# Patient Record
Sex: FEMALE | Race: BLACK OR AFRICAN AMERICAN | Employment: FULL TIME | URBAN - METROPOLITAN AREA
[De-identification: names, ages, dates, MRNs, and addresses within clinical notes are randomized per-mention and may not be internally consistent; named-entity substitution may affect disease eponyms.]

---

## 2018-08-22 ENCOUNTER — PAT TELEPHONE (OUTPATIENT)
Dept: PREADMISSION TESTING | Age: 38
End: 2018-08-22

## 2018-08-22 VITALS — HEIGHT: 69 IN | BODY MASS INDEX: 43.4 KG/M2 | WEIGHT: 293 LBS

## 2018-08-22 RX ORDER — AMLODIPINE BESYLATE 10 MG/1
10 TABLET ORAL DAILY
COMMUNITY
End: 2021-09-06

## 2018-08-22 RX ORDER — FUROSEMIDE 80 MG
80 TABLET ORAL DAILY
COMMUNITY

## 2018-08-27 ENCOUNTER — HOSPITAL ENCOUNTER (OUTPATIENT)
Dept: ENDOSCOPY | Age: 38
Discharge: HOME | End: 2018-08-27
Attending: INTERNAL MEDICINE | Admitting: INTERNAL MEDICINE

## 2018-08-27 VITALS
BODY MASS INDEX: 43.4 KG/M2 | OXYGEN SATURATION: 99 % | DIASTOLIC BLOOD PRESSURE: 91 MMHG | HEIGHT: 69 IN | RESPIRATION RATE: 18 BRPM | SYSTOLIC BLOOD PRESSURE: 174 MMHG | TEMPERATURE: 97.4 F | HEART RATE: 71 BPM | WEIGHT: 293 LBS

## 2018-08-27 LAB
ANION GAP SERPL CALCULATED.3IONS-SCNC: 16 MMOL/L (ref 3–16)
BUN BLDV-MCNC: 38 MG/DL (ref 7–20)
CALCIUM SERPL-MCNC: 9.4 MG/DL (ref 8.3–10.6)
CHLORIDE BLD-SCNC: 95 MMOL/L (ref 99–110)
CO2: 22 MMOL/L (ref 21–32)
CREAT SERPL-MCNC: 5.2 MG/DL (ref 0.6–1.1)
GFR AFRICAN AMERICAN: 11
GFR NON-AFRICAN AMERICAN: 9
GLUCOSE BLD-MCNC: 128 MG/DL (ref 70–99)
GLUCOSE BLD-MCNC: 159 MG/DL (ref 70–99)
GLUCOSE BLD-MCNC: 161 MG/DL (ref 70–99)
PERFORMED ON: ABNORMAL
PERFORMED ON: ABNORMAL
POTASSIUM REFLEX MAGNESIUM: 4.2 MMOL/L (ref 3.5–5.1)
PREGNANCY, URINE: NEGATIVE
SODIUM BLD-SCNC: 133 MMOL/L (ref 136–145)

## 2018-08-27 RX ORDER — FENTANYL CITRATE 50 UG/ML
50 INJECTION, SOLUTION INTRAMUSCULAR; INTRAVENOUS EVERY 5 MIN PRN
Status: DISCONTINUED | OUTPATIENT
Start: 2018-08-27 | End: 2018-08-28 | Stop reason: HOSPADM

## 2018-08-27 RX ORDER — SODIUM CHLORIDE 0.9 % (FLUSH) 0.9 %
10 SYRINGE (ML) INJECTION EVERY 12 HOURS SCHEDULED
Status: DISCONTINUED | OUTPATIENT
Start: 2018-08-27 | End: 2018-08-28 | Stop reason: HOSPADM

## 2018-08-27 RX ORDER — SODIUM CHLORIDE 0.9 % (FLUSH) 0.9 %
10 SYRINGE (ML) INJECTION PRN
Status: DISCONTINUED | OUTPATIENT
Start: 2018-08-27 | End: 2018-08-28 | Stop reason: HOSPADM

## 2018-08-27 RX ORDER — MEPERIDINE HYDROCHLORIDE 25 MG/ML
12.5 INJECTION INTRAMUSCULAR; INTRAVENOUS; SUBCUTANEOUS EVERY 5 MIN PRN
Status: DISCONTINUED | OUTPATIENT
Start: 2018-08-27 | End: 2018-08-28 | Stop reason: HOSPADM

## 2018-08-27 RX ORDER — FENTANYL CITRATE 50 UG/ML
25 INJECTION, SOLUTION INTRAMUSCULAR; INTRAVENOUS EVERY 5 MIN PRN
Status: DISCONTINUED | OUTPATIENT
Start: 2018-08-27 | End: 2018-08-28 | Stop reason: HOSPADM

## 2018-08-27 RX ORDER — ONDANSETRON 2 MG/ML
4 INJECTION INTRAMUSCULAR; INTRAVENOUS
Status: ACTIVE | OUTPATIENT
Start: 2018-08-27 | End: 2018-08-27

## 2018-08-27 RX ORDER — SODIUM CHLORIDE 9 MG/ML
INJECTION, SOLUTION INTRAVENOUS CONTINUOUS
Status: DISCONTINUED | OUTPATIENT
Start: 2018-08-27 | End: 2018-08-28 | Stop reason: HOSPADM

## 2018-08-27 ASSESSMENT — PAIN - FUNCTIONAL ASSESSMENT: PAIN_FUNCTIONAL_ASSESSMENT: 0-10

## 2018-08-27 ASSESSMENT — PAIN SCALES - GENERAL
PAINLEVEL_OUTOF10: 0

## 2018-08-27 NOTE — OP NOTE
small bowel. The scope was then withdrawn back into the stomach, it was decompressed, and the scope was completely withdrawn. A digital rectal examination was performed and revealed negative without mass, lesions or tenderness. The Olympus video colonoscope was placed in the patient's rectum under digital direction and advanced to the cecum. The cecum was identified by characteristic anatomy and ballottment. The prep was excellent. The ileocecal valve was identified. The terminal ileum was normal appearing examined 10 cm into the TI. The scope was then withdrawn back through the cecum, ascending, transverse, descending, sigmoid colon, and rectum. Careful circumferential examination of the mucosa in these areas demonstrated:    1. A 3 mm polyp in the ascending colon was removed completely with snare cautery polypectomy. 2. A 2 mm polyp in the rectosigmoid colon removed completely with biopsy polypectomy    The scope was then withdrawn into the rectum and retroflexed. The retroflexed view of the anal verge and rectum demonstrates normal rectum. The scope was straightened, the colon was decompressed and the scope was withdrawn from the patient. The patient tolerated the procedure well and was taken to the PACU in good condition. Impression:    1) See post procedure diagnoses    Recommendations:   1. Await pathology results. 2. If biopsies are unremarkable for Celiac disease and anemia continues would recommend capsule endoscopy to assess for any small bowel AVMs contributing to anemia. Recommend repeat CBC and Iron studies in 4-6 weeks. 3. If polyps are adenomas recommend repeat Colonoscopy in 5 years for surveillance purposes. 4. The patient had biopsies taken today. The patient should call for results in 7 days if they have not heard from our office. Our number is [phone].     Jacqui Garcia MD  [address] and [address] [facility]

## 2018-08-27 NOTE — H&P
Pre-operative History and Physical    Patient: Aureliano Mobley  : 1980  Acct#:     Intended Procedure:  EGD/Colonoscopy    HISTORY OF PRESENT ILLNESS:  The patient is a 41 y.o. female  who presents for/due to [address]       Past Medical History:        Diagnosis Date    Anemia     Chronic kidney disease     Diabetes mellitus ([address]     Hypertension     Obesity      Past Surgical History:        Procedure Laterality Date     SECTION  ,,    CHOLECYSTECTOMY      DIALYSIS FISTULA CREATION      HAND TENDON SURGERY      thumb-     TUBAL LIGATION  2906    UMBILICAL HERNIA REPAIR       Medications Prior to Admission:   Current Outpatient Prescriptions on File Prior to Encounter   Medication Sig Dispense Refill    amLODIPine (NORVASC) 10 MG tablet Take 10 mg by mouth daily      furosemide (LASIX) 80 MG tablet Take 80 mg by mouth daily      Cholecalciferol (VITAMIN D3) 2000 UNITS TABS   4    doxazosin (CARDURA) 1 MG tablet Take 4 mg by mouth daily   5    acetaminophen (TYLENOL) 325 MG tablet Take 650 mg by mouth every 6 hours as needed for Pain      insulin glargine (LANTUS) 100 UNIT/ML injection vial Inject 70 Units into the skin 2 times daily.  lisinopril-hydrochlorothiazide (PRINZIDE;ZESTORETIC) 10-12.5 MG per tablet Take 1 tablet by mouth every evening        No current facility-administered medications on file prior to encounter. Allergies:  Hydrocodone-acetaminophen; Iv dye [iodides]; and Naproxen    Social History:   TOBACCO:   reports that she has never smoked. She has never used smokeless tobacco.  ETOH:   reports that she does not drink alcohol. DRUGS:   reports that she does not use drugs. PHYSICAL EXAM:      Vital Signs: LMP 2018    Airway: No stridor or wheezing noted. Good air movement  Pulmonary: without wheezes.   Clear to auscultation  Cardiac:regular rate and rhythm without loud murmurs  Abdomen:soft, nontender,  Bowel sounds

## 2018-08-27 NOTE — ANESTHESIA PRE-OP
04/01/2016    ALT 27 04/01/2016     BMP    Lab Results   Component Value Date     04/01/2016    K 4.5 04/01/2016     04/01/2016    CO2 25 04/01/2016    BUN 24 04/01/2016    CREATININE 2.0 04/01/2016    CALCIUM 9.0 04/01/2016    LABGLOM 30.3 04/01/2016    GLUCOSE 238 04/01/2016     POCGlucose  No results for input(s): GLUCOSE in the last 72 hours. Coags  No results found for: PROTIME, INR, APTT  HCG (If Applicable)   Lab Results   Component Value Date    PREGTESTUR Neg 06/20/2010      ABGs No results found for: PHART, PO2ART, LBJ9CLJ, JZZ1FRU, BEART, D2MCUKVF   Type & Screen (If Applicable)  No results found for: LABABO, LABRH                         BMI: Wt Readings from Last 3 Encounters:       NPO Status:                          Anesthesia Evaluation  Patient summary reviewed and Nursing notes reviewed   history of anesthetic complications: PONV. Airway: Mallampati: III  TM distance: >3 FB   Neck ROM: full  Mouth opening: > = 3 FB Dental:          Pulmonary:Negative Pulmonary ROS and normal exam                               Cardiovascular:    (+) hypertension:,     (-) pacemaker, valvular problems/murmurs, past MI, CAD, CABG/stent, dysrhythmias,  angina,  CHF, orthopnea, PND and  LANDEROS      Rhythm: regular  Rate: normal                    Neuro/Psych:   Negative Neuro/Psych ROS              GI/Hepatic/Renal:   (+) renal disease (Last HD was 8-24. She is due to have HD today): ESRD and dialysis, bowel prep, morbid obesity (BMI 47)     (-) hiatal hernia, GERD, PUD, hepatitis and liver disease       Endo/Other:    (+) Diabetes, no malignancy/cancer. (-) hypothyroidism, hyperthyroidism, blood dyscrasia, arthritis, no electrolyte abnormalities, no malignancy/cancer               Abdominal:   (+) obese,         Vascular: negative vascular ROS. Anesthesia Plan      MAC     ASA 3       Induction: intravenous.       Anesthetic plan and risks discussed with

## 2021-05-15 ENCOUNTER — APPOINTMENT (OUTPATIENT)
Dept: GENERAL RADIOLOGY | Age: 41
End: 2021-05-15
Payer: COMMERCIAL

## 2021-05-15 ENCOUNTER — APPOINTMENT (OUTPATIENT)
Dept: CT IMAGING | Age: 41
End: 2021-05-15
Payer: COMMERCIAL

## 2021-05-15 ENCOUNTER — HOSPITAL ENCOUNTER (EMERGENCY)
Age: 41
Discharge: HOME | End: 2021-05-15
Attending: EMERGENCY MEDICINE
Payer: COMMERCIAL

## 2021-05-15 VITALS
TEMPERATURE: 98.5 F | BODY MASS INDEX: 42.52 KG/M2 | WEIGHT: 287.92 LBS | OXYGEN SATURATION: 100 % | HEART RATE: 96 BPM | SYSTOLIC BLOOD PRESSURE: 129 MMHG | RESPIRATION RATE: 15 BRPM | DIASTOLIC BLOOD PRESSURE: 61 MMHG

## 2021-05-15 DIAGNOSIS — R41.82 ALTERED MENTAL STATUS, UNSPECIFIED ALTERED MENTAL STATUS TYPE: Primary | ICD-10-CM

## 2021-05-15 LAB
A/G RATIO: 1.3 (ref 1.1–2.2)
ACETAMINOPHEN LEVEL: <5 UG/ML (ref 10–30)
ALBUMIN SERPL-MCNC: 3.9 G/DL (ref 3.4–5)
ALP BLD-CCNC: 62 U/L (ref 40–129)
ALT SERPL-CCNC: 14 U/L (ref 10–40)
AMPHETAMINE SCREEN, URINE: ABNORMAL
ANION GAP SERPL CALCULATED.3IONS-SCNC: 11 MMOL/L (ref 3–16)
AST SERPL-CCNC: 13 U/L (ref 15–37)
BARBITURATE SCREEN URINE: ABNORMAL
BASOPHILS ABSOLUTE: 0 K/UL (ref 0–0.2)
BASOPHILS RELATIVE PERCENT: 0.5 %
BENZODIAZEPINE SCREEN, URINE: ABNORMAL
BILIRUB SERPL-MCNC: 0.5 MG/DL (ref 0–1)
BUN BLDV-MCNC: 15 MG/DL (ref 7–20)
CALCIUM SERPL-MCNC: 9.1 MG/DL (ref 8.3–10.6)
CANNABINOID SCREEN URINE: POSITIVE
CHLORIDE BLD-SCNC: 103 MMOL/L (ref 99–110)
CHP ED QC CHECK: YES
CO2: 23 MMOL/L (ref 21–32)
COCAINE METABOLITE SCREEN URINE: ABNORMAL
CREAT SERPL-MCNC: 1 MG/DL (ref 0.6–1.1)
EOSINOPHILS ABSOLUTE: 0 K/UL (ref 0–0.6)
EOSINOPHILS RELATIVE PERCENT: 0.6 %
ETHANOL: NORMAL MG/DL (ref 0–0.08)
GFR AFRICAN AMERICAN: >60
GFR NON-AFRICAN AMERICAN: >60
GLOBULIN: 3 G/DL
GLUCOSE BLD-MCNC: 176 MG/DL (ref 70–99)
GLUCOSE BLD-MCNC: 214 MG/DL
GLUCOSE BLD-MCNC: 214 MG/DL (ref 70–99)
HCT VFR BLD CALC: 37.3 % (ref 36–48)
HEMOGLOBIN: 12.3 G/DL (ref 12–16)
INR BLD: 0.97 (ref 0.86–1.14)
LYMPHOCYTES ABSOLUTE: 0.5 K/UL (ref 1–5.1)
LYMPHOCYTES RELATIVE PERCENT: 7.7 %
Lab: ABNORMAL
MCH RBC QN AUTO: 29.2 PG (ref 26–34)
MCHC RBC AUTO-ENTMCNC: 32.9 G/DL (ref 31–36)
MCV RBC AUTO: 88.6 FL (ref 80–100)
METHADONE SCREEN, URINE: ABNORMAL
MONOCYTES ABSOLUTE: 0.4 K/UL (ref 0–1.3)
MONOCYTES RELATIVE PERCENT: 6.8 %
NEUTROPHILS ABSOLUTE: 5.6 K/UL (ref 1.7–7.7)
NEUTROPHILS RELATIVE PERCENT: 84.4 %
OPIATE SCREEN URINE: ABNORMAL
OXYCODONE URINE: ABNORMAL
PDW BLD-RTO: 14 % (ref 12.4–15.4)
PERFORMED ON: ABNORMAL
PH UA: 5
PHENCYCLIDINE SCREEN URINE: ABNORMAL
PLATELET # BLD: 183 K/UL (ref 135–450)
PMV BLD AUTO: 8.6 FL (ref 5–10.5)
POTASSIUM REFLEX MAGNESIUM: 4.3 MMOL/L (ref 3.5–5.1)
PROPOXYPHENE SCREEN: ABNORMAL
PROTHROMBIN TIME: 11.3 SEC (ref 10–13.2)
RBC # BLD: 4.21 M/UL (ref 4–5.2)
SALICYLATE, SERUM: <0.3 MG/DL (ref 15–30)
SODIUM BLD-SCNC: 137 MMOL/L (ref 136–145)
TOTAL PROTEIN: 6.9 G/DL (ref 6.4–8.2)
TROPONIN: <0.01 NG/ML
WBC # BLD: 6.6 K/UL (ref 4–11)

## 2021-05-15 PROCEDURE — 80053 COMPREHEN METABOLIC PANEL: CPT

## 2021-05-15 PROCEDURE — 85610 PROTHROMBIN TIME: CPT

## 2021-05-15 PROCEDURE — 6360000004 HC RX CONTRAST MEDICATION: Performed by: EMERGENCY MEDICINE

## 2021-05-15 PROCEDURE — 80179 DRUG ASSAY SALICYLATE: CPT

## 2021-05-15 PROCEDURE — 85025 COMPLETE CBC W/AUTO DIFF WBC: CPT

## 2021-05-15 PROCEDURE — 99282 EMERGENCY DEPT VISIT SF MDM: CPT

## 2021-05-15 PROCEDURE — 82077 ASSAY SPEC XCP UR&BREATH IA: CPT

## 2021-05-15 PROCEDURE — 70496 CT ANGIOGRAPHY HEAD: CPT

## 2021-05-15 PROCEDURE — 80307 DRUG TEST PRSMV CHEM ANLYZR: CPT

## 2021-05-15 PROCEDURE — 71045 X-RAY EXAM CHEST 1 VIEW: CPT

## 2021-05-15 PROCEDURE — 70450 CT HEAD/BRAIN W/O DYE: CPT

## 2021-05-15 PROCEDURE — 93005 ELECTROCARDIOGRAM TRACING: CPT | Performed by: EMERGENCY MEDICINE

## 2021-05-15 PROCEDURE — 84484 ASSAY OF TROPONIN QUANT: CPT

## 2021-05-15 PROCEDURE — 80143 DRUG ASSAY ACETAMINOPHEN: CPT

## 2021-05-15 RX ADMIN — IOPAMIDOL 75 ML: 755 INJECTION, SOLUTION INTRAVENOUS at 12:09

## 2021-05-15 ASSESSMENT — ENCOUNTER SYMPTOMS
EYE PAIN: 0
ABDOMINAL PAIN: 0
COUGH: 0
BACK PAIN: 0
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
## 2021-05-15 NOTE — ED NOTES
Pt was okay'd to take her antirejections medications per ED MD.     William Montes RN  05/15/21 3240

## 2021-05-16 LAB
EKG ATRIAL RATE: 84 BPM
EKG DIAGNOSIS: NORMAL
EKG P AXIS: 25 DEGREES
EKG P-R INTERVAL: 136 MS
EKG Q-T INTERVAL: 360 MS
EKG QRS DURATION: 86 MS
EKG QTC CALCULATION (BAZETT): 425 MS
EKG R AXIS: -16 DEGREES
EKG T AXIS: 23 DEGREES
EKG VENTRICULAR RATE: 84 BPM

## 2021-05-16 PROCEDURE — 93010 ELECTROCARDIOGRAM REPORT: CPT | Performed by: INTERNAL MEDICINE

## 2021-09-06 ENCOUNTER — HOSPITAL ENCOUNTER (INPATIENT)
Age: 41
LOS: 3 days | Discharge: ANOTHER HOSPITAL | DRG: 699 | End: 2021-09-09
Attending: EMERGENCY MEDICINE | Admitting: HOSPITALIST
Payer: MEDICARE

## 2021-09-06 ENCOUNTER — APPOINTMENT (OUTPATIENT)
Dept: CT IMAGING | Age: 41
DRG: 699 | End: 2021-09-06
Payer: MEDICARE

## 2021-09-06 DIAGNOSIS — R10.9 FLANK PAIN: Primary | ICD-10-CM

## 2021-09-06 PROBLEM — N13.9 OBSTRUCTIVE UROPATHY: Status: ACTIVE | Noted: 2021-09-06

## 2021-09-06 LAB
A/G RATIO: 1.2 (ref 1.1–2.2)
ALBUMIN SERPL-MCNC: 3.8 G/DL (ref 3.4–5)
ALP BLD-CCNC: 61 U/L (ref 40–129)
ALT SERPL-CCNC: 25 U/L (ref 10–40)
ANION GAP SERPL CALCULATED.3IONS-SCNC: 14 MMOL/L (ref 3–16)
AST SERPL-CCNC: 23 U/L (ref 15–37)
BACTERIA: ABNORMAL /HPF
BASOPHILS ABSOLUTE: 0 K/UL (ref 0–0.2)
BASOPHILS RELATIVE PERCENT: 0.7 %
BILIRUB SERPL-MCNC: 0.5 MG/DL (ref 0–1)
BILIRUBIN URINE: NEGATIVE
BLOOD, URINE: NEGATIVE
BUN BLDV-MCNC: 10 MG/DL (ref 7–20)
CALCIUM SERPL-MCNC: 9 MG/DL (ref 8.3–10.6)
CHLORIDE BLD-SCNC: 99 MMOL/L (ref 99–110)
CLARITY: ABNORMAL
CO2: 23 MMOL/L (ref 21–32)
COLOR: YELLOW
CREAT SERPL-MCNC: 1 MG/DL (ref 0.6–1.1)
EOSINOPHILS ABSOLUTE: 0.1 K/UL (ref 0–0.6)
EOSINOPHILS RELATIVE PERCENT: 1.5 %
EPITHELIAL CELLS, UA: 7 /HPF (ref 0–5)
GFR AFRICAN AMERICAN: >60
GFR NON-AFRICAN AMERICAN: >60
GLOBULIN: 3.2 G/DL
GLUCOSE BLD-MCNC: 227 MG/DL (ref 70–99)
GLUCOSE BLD-MCNC: 228 MG/DL (ref 70–99)
GLUCOSE URINE: >=1000 MG/DL
HCG(URINE) PREGNANCY TEST: NEGATIVE
HCT VFR BLD CALC: 39.7 % (ref 36–48)
HEMOGLOBIN: 13.1 G/DL (ref 12–16)
HYALINE CASTS: 0 /LPF (ref 0–8)
KETONES, URINE: NEGATIVE MG/DL
LACTIC ACID: 1.8 MMOL/L (ref 0.4–2)
LEUKOCYTE ESTERASE, URINE: NEGATIVE
LIPASE: 37 U/L (ref 13–60)
LYMPHOCYTES ABSOLUTE: 0.8 K/UL (ref 1–5.1)
LYMPHOCYTES RELATIVE PERCENT: 16.8 %
MCH RBC QN AUTO: 28.8 PG (ref 26–34)
MCHC RBC AUTO-ENTMCNC: 33 G/DL (ref 31–36)
MCV RBC AUTO: 87.2 FL (ref 80–100)
MICROSCOPIC EXAMINATION: YES
MONOCYTES ABSOLUTE: 0.5 K/UL (ref 0–1.3)
MONOCYTES RELATIVE PERCENT: 9.8 %
NEUTROPHILS ABSOLUTE: 3.6 K/UL (ref 1.7–7.7)
NEUTROPHILS RELATIVE PERCENT: 71.2 %
NITRITE, URINE: NEGATIVE
PDW BLD-RTO: 13.7 % (ref 12.4–15.4)
PERFORMED ON: ABNORMAL
PH UA: 6.5 (ref 5–8)
PLATELET # BLD: 202 K/UL (ref 135–450)
PMV BLD AUTO: 8.2 FL (ref 5–10.5)
POTASSIUM SERPL-SCNC: 3.9 MMOL/L (ref 3.5–5.1)
PROTEIN UA: 30 MG/DL
RBC # BLD: 4.55 M/UL (ref 4–5.2)
RBC UA: 0 /HPF (ref 0–4)
SODIUM BLD-SCNC: 136 MMOL/L (ref 136–145)
SPECIFIC GRAVITY UA: 1.01 (ref 1–1.03)
TOTAL PROTEIN: 7 G/DL (ref 6.4–8.2)
URINE REFLEX TO CULTURE: ABNORMAL
URINE TYPE: ABNORMAL
UROBILINOGEN, URINE: 0.2 E.U./DL
WBC # BLD: 5 K/UL (ref 4–11)
WBC UA: 5 /HPF (ref 0–5)

## 2021-09-06 PROCEDURE — 2580000003 HC RX 258: Performed by: EMERGENCY MEDICINE

## 2021-09-06 PROCEDURE — 81001 URINALYSIS AUTO W/SCOPE: CPT

## 2021-09-06 PROCEDURE — 6360000002 HC RX W HCPCS: Performed by: HOSPITALIST

## 2021-09-06 PROCEDURE — 96374 THER/PROPH/DIAG INJ IV PUSH: CPT

## 2021-09-06 PROCEDURE — 80053 COMPREHEN METABOLIC PANEL: CPT

## 2021-09-06 PROCEDURE — 6360000002 HC RX W HCPCS: Performed by: EMERGENCY MEDICINE

## 2021-09-06 PROCEDURE — 83605 ASSAY OF LACTIC ACID: CPT

## 2021-09-06 PROCEDURE — 83690 ASSAY OF LIPASE: CPT

## 2021-09-06 PROCEDURE — 84703 CHORIONIC GONADOTROPIN ASSAY: CPT

## 2021-09-06 PROCEDURE — 1200000000 HC SEMI PRIVATE

## 2021-09-06 PROCEDURE — 2580000003 HC RX 258: Performed by: HOSPITALIST

## 2021-09-06 PROCEDURE — 99285 EMERGENCY DEPT VISIT HI MDM: CPT

## 2021-09-06 PROCEDURE — 74176 CT ABD & PELVIS W/O CONTRAST: CPT

## 2021-09-06 PROCEDURE — 96375 TX/PRO/DX INJ NEW DRUG ADDON: CPT

## 2021-09-06 PROCEDURE — 96376 TX/PRO/DX INJ SAME DRUG ADON: CPT

## 2021-09-06 PROCEDURE — 85025 COMPLETE CBC W/AUTO DIFF WBC: CPT

## 2021-09-06 RX ORDER — 0.9 % SODIUM CHLORIDE 0.9 %
1000 INTRAVENOUS SOLUTION INTRAVENOUS ONCE
Status: COMPLETED | OUTPATIENT
Start: 2021-09-06 | End: 2021-09-06

## 2021-09-06 RX ORDER — ENTECAVIR 0.5 MG/1
0.5 TABLET, FILM COATED ORAL DAILY
COMMUNITY

## 2021-09-06 RX ORDER — MORPHINE SULFATE 4 MG/ML
4 INJECTION, SOLUTION INTRAMUSCULAR; INTRAVENOUS ONCE
Status: COMPLETED | OUTPATIENT
Start: 2021-09-06 | End: 2021-09-06

## 2021-09-06 RX ORDER — ACETAMINOPHEN 325 MG/1
650 TABLET ORAL EVERY 6 HOURS PRN
Status: DISCONTINUED | OUTPATIENT
Start: 2021-09-06 | End: 2021-09-10 | Stop reason: HOSPADM

## 2021-09-06 RX ORDER — SODIUM CHLORIDE 9 MG/ML
INJECTION, SOLUTION INTRAVENOUS CONTINUOUS
Status: DISCONTINUED | OUTPATIENT
Start: 2021-09-06 | End: 2021-09-10 | Stop reason: HOSPADM

## 2021-09-06 RX ORDER — ONDANSETRON 2 MG/ML
4 INJECTION INTRAMUSCULAR; INTRAVENOUS ONCE
Status: COMPLETED | OUTPATIENT
Start: 2021-09-06 | End: 2021-09-06

## 2021-09-06 RX ORDER — CITALOPRAM 10 MG/1
10 TABLET ORAL NIGHTLY
COMMUNITY

## 2021-09-06 RX ORDER — SODIUM CHLORIDE 0.9 % (FLUSH) 0.9 %
5-40 SYRINGE (ML) INJECTION PRN
Status: DISCONTINUED | OUTPATIENT
Start: 2021-09-06 | End: 2021-09-10 | Stop reason: HOSPADM

## 2021-09-06 RX ORDER — MYCOPHENOLATE MOFETIL 250 MG/1
250 CAPSULE ORAL 2 TIMES DAILY
COMMUNITY

## 2021-09-06 RX ORDER — MYCOPHENOLATE MOFETIL 250 MG/1
1000 CAPSULE ORAL 2 TIMES DAILY
Status: DISCONTINUED | OUTPATIENT
Start: 2021-09-06 | End: 2021-09-10 | Stop reason: HOSPADM

## 2021-09-06 RX ORDER — TACROLIMUS 1 MG/1
1.5 CAPSULE ORAL 2 TIMES DAILY
COMMUNITY

## 2021-09-06 RX ORDER — DULAGLUTIDE 1.5 MG/.5ML
1.5 INJECTION, SOLUTION SUBCUTANEOUS WEEKLY
COMMUNITY

## 2021-09-06 RX ORDER — MORPHINE SULFATE 2 MG/ML
2 INJECTION, SOLUTION INTRAMUSCULAR; INTRAVENOUS ONCE
Status: COMPLETED | OUTPATIENT
Start: 2021-09-06 | End: 2021-09-06

## 2021-09-06 RX ORDER — MORPHINE SULFATE 2 MG/ML
2 INJECTION, SOLUTION INTRAMUSCULAR; INTRAVENOUS EVERY 4 HOURS PRN
Status: DISCONTINUED | OUTPATIENT
Start: 2021-09-06 | End: 2021-09-06

## 2021-09-06 RX ORDER — SODIUM CHLORIDE 0.9 % (FLUSH) 0.9 %
5-40 SYRINGE (ML) INJECTION EVERY 12 HOURS SCHEDULED
Status: DISCONTINUED | OUTPATIENT
Start: 2021-09-06 | End: 2021-09-10 | Stop reason: HOSPADM

## 2021-09-06 RX ORDER — ONDANSETRON 4 MG/1
4 TABLET, ORALLY DISINTEGRATING ORAL EVERY 8 HOURS PRN
Status: DISCONTINUED | OUTPATIENT
Start: 2021-09-06 | End: 2021-09-07

## 2021-09-06 RX ORDER — 0.9 % SODIUM CHLORIDE 0.9 %
500 INTRAVENOUS SOLUTION INTRAVENOUS ONCE
Status: COMPLETED | OUTPATIENT
Start: 2021-09-06 | End: 2021-09-06

## 2021-09-06 RX ORDER — ACETAMINOPHEN 650 MG/1
650 SUPPOSITORY RECTAL EVERY 6 HOURS PRN
Status: DISCONTINUED | OUTPATIENT
Start: 2021-09-06 | End: 2021-09-10 | Stop reason: HOSPADM

## 2021-09-06 RX ORDER — POLYETHYLENE GLYCOL 3350 17 G/17G
17 POWDER, FOR SOLUTION ORAL DAILY PRN
Status: DISCONTINUED | OUTPATIENT
Start: 2021-09-06 | End: 2021-09-10 | Stop reason: HOSPADM

## 2021-09-06 RX ORDER — SODIUM CHLORIDE 9 MG/ML
25 INJECTION, SOLUTION INTRAVENOUS PRN
Status: DISCONTINUED | OUTPATIENT
Start: 2021-09-06 | End: 2021-09-10 | Stop reason: HOSPADM

## 2021-09-06 RX ORDER — ONDANSETRON 2 MG/ML
4 INJECTION INTRAMUSCULAR; INTRAVENOUS EVERY 6 HOURS PRN
Status: DISCONTINUED | OUTPATIENT
Start: 2021-09-06 | End: 2021-09-07

## 2021-09-06 RX ADMIN — SODIUM CHLORIDE 500 ML: 9 INJECTION, SOLUTION INTRAVENOUS at 12:50

## 2021-09-06 RX ADMIN — SODIUM CHLORIDE: 9 INJECTION, SOLUTION INTRAVENOUS at 22:34

## 2021-09-06 RX ADMIN — MORPHINE SULFATE 4 MG: 4 INJECTION, SOLUTION INTRAMUSCULAR; INTRAVENOUS at 16:05

## 2021-09-06 RX ADMIN — MORPHINE SULFATE 2 MG: 2 INJECTION, SOLUTION INTRAMUSCULAR; INTRAVENOUS at 12:48

## 2021-09-06 RX ADMIN — SODIUM CHLORIDE 1000 ML: 9 INJECTION, SOLUTION INTRAVENOUS at 17:51

## 2021-09-06 RX ADMIN — SODIUM CHLORIDE, PRESERVATIVE FREE 10 ML: 5 INJECTION INTRAVENOUS at 21:14

## 2021-09-06 RX ADMIN — MYCOPHENOLATE MOFETIL 1000 MG: 250 CAPSULE ORAL at 21:26

## 2021-09-06 RX ADMIN — HYDROMORPHONE HYDROCHLORIDE 1 MG: 1 INJECTION, SOLUTION INTRAMUSCULAR; INTRAVENOUS; SUBCUTANEOUS at 22:30

## 2021-09-06 RX ADMIN — ONDANSETRON 4 MG: 2 INJECTION INTRAMUSCULAR; INTRAVENOUS at 16:05

## 2021-09-06 RX ADMIN — ONDANSETRON 4 MG: 2 INJECTION INTRAMUSCULAR; INTRAVENOUS at 12:48

## 2021-09-06 RX ADMIN — MORPHINE SULFATE 4 MG: 4 INJECTION, SOLUTION INTRAMUSCULAR; INTRAVENOUS at 17:50

## 2021-09-06 ASSESSMENT — PAIN DESCRIPTION - DESCRIPTORS
DESCRIPTORS: ACHING;SHARP
DESCRIPTORS: SHARP
DESCRIPTORS: ACHING;SHARP

## 2021-09-06 ASSESSMENT — PAIN SCALES - GENERAL
PAINLEVEL_OUTOF10: 0
PAINLEVEL_OUTOF10: 5
PAINLEVEL_OUTOF10: 7
PAINLEVEL_OUTOF10: 6
PAINLEVEL_OUTOF10: 8
PAINLEVEL_OUTOF10: 8
PAINLEVEL_OUTOF10: 7
PAINLEVEL_OUTOF10: 8

## 2021-09-06 ASSESSMENT — PAIN DESCRIPTION - LOCATION
LOCATION: FLANK;ABDOMEN
LOCATION: FLANK

## 2021-09-06 ASSESSMENT — PAIN DESCRIPTION - PAIN TYPE
TYPE: ACUTE PAIN

## 2021-09-06 ASSESSMENT — PAIN DESCRIPTION - ONSET
ONSET: ON-GOING
ONSET: ON-GOING
ONSET: GRADUAL
ONSET: ON-GOING
ONSET: ON-GOING

## 2021-09-06 ASSESSMENT — PAIN DESCRIPTION - FREQUENCY
FREQUENCY: INTERMITTENT
FREQUENCY: CONTINUOUS
FREQUENCY: INTERMITTENT
FREQUENCY: CONTINUOUS

## 2021-09-06 ASSESSMENT — PAIN - FUNCTIONAL ASSESSMENT
PAIN_FUNCTIONAL_ASSESSMENT: PREVENTS OR INTERFERES SOME ACTIVE ACTIVITIES AND ADLS

## 2021-09-06 ASSESSMENT — PAIN DESCRIPTION - PROGRESSION
CLINICAL_PROGRESSION: GRADUALLY IMPROVING
CLINICAL_PROGRESSION: NOT CHANGED
CLINICAL_PROGRESSION: GRADUALLY IMPROVING
CLINICAL_PROGRESSION: GRADUALLY IMPROVING
CLINICAL_PROGRESSION: GRADUALLY WORSENING
CLINICAL_PROGRESSION: GRADUALLY WORSENING

## 2021-09-06 ASSESSMENT — PAIN DESCRIPTION - ORIENTATION
ORIENTATION: RIGHT

## 2021-09-06 NOTE — ED NOTES
ED SBAR report provider to [facility]. Patient to be transported to Room 4108 via stretcher by with IV medications infusing. IV site clean, dry, and intact. MEWS score updated and pain assessed as and documented. Updated patient and family on plan of care.        Jaky Turner RN  09/06/21 6401

## 2021-09-06 NOTE — ED TRIAGE NOTES
Patient to the ED via private car, states she started having right sided abdominal pain last night, worsening today and radiating to right flank this morning around 0900, states \"I couldn't walk and had to bend over the pain was so bad. \" +nausea, no vomiting, denies urinary symptoms.

## 2021-09-06 NOTE — ED PROVIDER NOTES
Triage Chief Complaint:   Flank Pain (right side, hx of kidney transpland on right side )    [place]:  Rasheed Lopez is a 46 y.o. female that presents for evaluation of right flank pain. The patient has a past medical history of being status post 2019 kidney transplant at  on the right side, diabetes, hypertension, anemia. She states no change in urine no change in bowel no chest pain no shortness of breath no trauma. She states the pain is in the right lower quadrant and right flank. She arrives afebrile she is not tachycardic she is in no acute distress    ROS:  At least 12 systems reviewed and otherwise acutely negative except as in the [address]     Past Medical History:   Diagnosis Date    Anemia     Chronic kidney disease     Diabetes mellitus ([address]     Hypertension     Obesity      Past Surgical History:   Procedure Laterality Date     SECTION  ,,    CHOLECYSTECTOMY      DIALYSIS FISTULA CREATION      ENDOSCOPY, COLON, DIAGNOSTIC  2018    Esophagogastroduodenoscopy with biopsy Colonoscopy with polypectomy (snare cautery)    HAND TENDON SURGERY      thumb- 2002    TUBAL LIGATION  9321    UMBILICAL HERNIA REPAIR       Family History   Problem Relation Age of Onset    Diabetes Mother     Hypertension Mother     Cancer Maternal Grandmother      Social History     Socioeconomic History    Marital status:      Spouse name: Not on file    Number of children: Not on file    Years of education: Not on file    Highest education level: Not on file   Occupational History    Not on file   Tobacco Use    Smoking status: Never Smoker    Smokeless tobacco: Never Used   Substance and Sexual Activity    Alcohol use: No    Drug use: No    Sexual activity: Never   Other Topics Concern    Not on file   Social History Narrative    Not on file     Social Determinants of Health     Financial Resource Strain:     Difficulty of Paying Living Expenses:    Food Insecurity:  Worried About [facility] in the Last Year:    [address] in the Last Year:    Transportation Needs:     Lack of Transportation (Medical):  Lack of Transportation (Non-Medical):    Physical Activity:     Days of Exercise per Week:     Minutes of Exercise per Session:    Stress:     Feeling of Stress :    Social Connections:     Frequency of Communication with Friends and Family:     Frequency of Social Gatherings with Friends and Family:     Attends [employer] Services:     Active Member of Clubs or Organizations:     Attends Club or Organization Meetings:     Marital Status:    Intimate Partner Violence:     Fear of Current or Ex-Partner:     Emotionally Abused:     Physically Abused:     Sexually Abused:      Current Facility-Administered Medications   Medication Dose Route Frequency Provider Last Rate Last Admin    0.9 % sodium chloride bolus  500 mL IntraVENous Once [employer],  mL/hr at 09/06/21 1250 500 mL at 09/06/21 1250     Current Outpatient Medications   Medication Sig Dispense Refill    amLODIPine (NORVASC) 10 MG tablet Take 10 mg by mouth daily      furosemide (LASIX) 80 MG tablet Take 80 mg by mouth daily      Cholecalciferol (VITAMIN D3) 2000 UNITS TABS   4    doxazosin (CARDURA) 1 MG tablet Take 4 mg by mouth daily   5    acetaminophen (TYLENOL) 325 MG tablet Take 650 mg by mouth every 6 hours as needed for Pain      insulin glargine (LANTUS) 100 UNIT/ML injection vial Inject 70 Units into the skin 2 times daily.       lisinopril-hydrochlorothiazide (PRINZIDE;ZESTORETIC) 10-12.5 MG per tablet Take 1 tablet by mouth every evening        Allergies   Allergen Reactions    Hydrocodone-Acetaminophen Itching    Iv Dye [Iodides] Other (See Comments)     R/t pt on dialysis pt told to not use    Naproxen Hives       [unfilled]    Nursing Notes Reviewed    Physical Exam:  Vitals:    09/06/21 1226   BP: (!) 148/75   Pulse: 86   Resp: 17   Temp: 98.6 °F (37 °C)   SpO2: 97%       GENERAL APPEARANCE: Awake and alert. Cooperative. No acute distress. HEAD: Normocephalic. Atraumatic. EYES: EOM's grossly intact. Sclera anicteric. ENT: Mucous membranes are moist. Tolerates saliva. No trismus. NECK: Supple. No meningismus. Trachea midline. HEART: RRR. Radial pulses 2+. LUNGS: Respirations unlabored. CTAB  ABDOMEN: Soft. TTP RLQ and R CVA. No guarding or rebound. EXTREMITIES: No acute deformities. SKIN: Warm and dry. NEUROLOGICAL: No gross facial drooping. Moves all 4 extremities spontaneously. PSYCHIATRIC: Normal mood.     I have reviewed and interpreted all of the currently available lab results from this visit (if applicable):  Results for orders placed or performed during the hospital encounter of 09/06/21   CBC Auto Differential   Result Value Ref Range    WBC 5.0 4.0 - 11.0 K/uL    RBC 4.55 4.00 - 5.20 M/uL    Hemoglobin 13.1 12.0 - 16.0 g/dL    Hematocrit 39.7 36.0 - 48.0 %    MCV 87.2 80.0 - 100.0 fL    MCH 28.8 26.0 - 34.0 pg    MCHC 33.0 31.0 - 36.0 g/dL    RDW 13.7 12.4 - 15.4 %    Platelets 948 657 - 217 K/uL    MPV 8.2 5.0 - 10.5 fL    Neutrophils % 71.2 %    Lymphocytes % 16.8 %    Monocytes % 9.8 %    Eosinophils % 1.5 %    Basophils % 0.7 %    Neutrophils Absolute 3.6 1.7 - 7.7 K/uL    Lymphocytes Absolute 0.8 (L) 1.0 - 5.1 K/uL    Monocytes Absolute 0.5 0.0 - 1.3 K/uL    Eosinophils Absolute 0.1 0.0 - 0.6 K/uL    Basophils Absolute 0.0 0.0 - 0.2 K/uL        Radiographs (if obtained):  [] The following radiograph was interpreted by myself in the absence of a radiologist:  [x] Radiologist's Report Reviewed:     CT ABDOMEN PELVIS WO CONTRAST Additional Contrast? None (Final result)  Result time 09/06/21 15:47:51  Final result by Rehana Madrigal MD (09/06/21 15:47:51)                Impression:    Right lower quadrant renal transplant, with 5 mm hyperdensity at the   implantation site of the transplanted ureter at the bladder.  In the absence   of history of postoperative clip in this region, findings are concerning for   calculus.  Mild pelviectasis.  Correlate with urinalysis. 4.4 cm cystic lesion along the anterior margin of the uterus, likely either   ovarian cyst or degenerative fibroid.  Ultrasound could be considered for   further characterization. Anterior abdominal wall hernias, without evidence of bowel dilatation to   suggest obstruction. Narrative:    EXAMINATION:   CT OF THE ABDOMEN AND PELVIS WITHOUT CONTRAST 9/6/2021 2:23 pm     TECHNIQUE:   CT of the abdomen and pelvis was performed without the administration of   intravenous contrast. Multiplanar reformatted images are provided for review. Dose modulation, iterative reconstruction, and/or weight based adjustment of   the mA/kV was utilized to reduce the radiation dose to as low as reasonably   achievable. COMPARISON:   None. HISTORY:   ORDERING SYSTEM PROVIDED HISTORY: right flank pain, hx renal transplant   TECHNOLOGIST PROVIDED HISTORY:   Reason for exam:->right flank pain, hx renal transplant   Additional Contrast?->None   Decision Support Exception - unselect if not a suspected or confirmed   emergency medical condition->Emergency Medical Condition (MA)   Is the patient pregnant?->No   Reason for Exam: right flank pain, hx renal transplant   Acuity: Acute   Type of Exam: Initial     FINDINGS:   Lower Chest: Basilar atelectasis. Organs: Lack of IV and oral contrast limits sensitivity for visceral organ   pathology. No calculi within the native kidneys.  No hydronephrosis.  Native kidneys are   small.  Fluid density lesion at the lower pole of the right kidney measuring   1.6 cm, likely cyst.  No calculi within the native ureters.  Right lower   quadrant renal transplant is seen with mild pelviectasis.  5 mm hyperdensity   is seen at the junction of the transplanted ureter and the bladder, as seen   on series 2, image 175.      Status post cholecystectomy.  Fatty liver.  Left hepatic lobe blends   imperceptibly with the spleen.  Postoperative change of stomach.  No   pancreatic stranding.  Adrenal glands are within normal limits.  Abdominal   aorta is within normal limits in caliber. GI/Bowel: Appendix is within normal limits in caliber.  Loops of small and   large bowel are nondilated.  Ventral hernia containing fat and portion of   transverse colon, though without evidence of bowel dilatation to suggest   obstruction.  Smaller hernia lateral to the right rectus muscle. Pelvis: Bladder is distended.  Pelvic phleboliths.  Uterus is present.  3.1 x   4.4 cm cystic lesion is seen along the anterior margin of the uterine fundus,   potentially degenerative fibroid or adnexal cyst adjacent to the uterus.  No   inguinal adenopathy. Peritoneum/Retroperitoneum: No free air. Bones/Soft Tissues: No acute process. EKG (if obtained): (All EKG's are interpreted by myself in the absence of a cardiologist)  Initial EKG on my interpretation shows n/a    MDM:  Differential diagnosis: Renal stone, pregnancy, pyelonephritis, transplant rejection    Patient CBC is unremarkable. CMP including renal function unremarkable. No evidence of UTI. CT done shows what appears to be a 5 mm stone at the transplanted ureter meeting the bladder. I spoke to urology here, Dr. Imelda Page, who states at this time pain control and conservative management is all he recommends but does state if the patient is admitted that urology service will consult but does believe it would be hospitalist admission . I spoke to her transplant nephrologist, Dr. Ruiz De Guzman, at [facility] and went over the case with her.   She states in the context of no evidence of infection, renal failure or rejection the patient can be managed here but she recommends that the patient do remain on her course of antirejection meds which she confirmed to me is CellCept 1,000 mg twice daily and Tacrolimus 1.5 mg twice daily. Patient received her third dose of morphine along with IV fluids and be admitted with urology at [place] consulted and with  stroke transplant nephrology aware. Old records reviewed. Labs and imaging reviewed and results discussed with patient. .        Patient was given scripts for the following medications. I counseled patient how to take these medications. New Prescriptions    No medications on file         CRITICAL CARE TIME   Total Critical Care time was 30 minutes, excluding separately reportable procedures. There was a high probability of clinically significant/life threatening deterioration in the patient's condition which required my urgent intervention.       Clinical Impression:  Flank pain, renal stone, history of renal transplant  (Please note that portions of this note may have been completed with a voice recognition program. Efforts were made to edit the dictations but occasionally words are mis-transcribed.)    MD Bill Harkins MD  09/06/21 8106

## 2021-09-07 LAB
GLUCOSE BLD-MCNC: 213 MG/DL (ref 70–99)
GLUCOSE BLD-MCNC: 216 MG/DL (ref 70–99)
GLUCOSE BLD-MCNC: 235 MG/DL (ref 70–99)
GLUCOSE BLD-MCNC: 258 MG/DL (ref 70–99)
HCT VFR BLD CALC: 40.9 % (ref 36–48)
HEMOGLOBIN: 13.5 G/DL (ref 12–16)
PERFORMED ON: ABNORMAL

## 2021-09-07 PROCEDURE — 94760 N-INVAS EAR/PLS OXIMETRY 1: CPT

## 2021-09-07 PROCEDURE — 1200000000 HC SEMI PRIVATE

## 2021-09-07 PROCEDURE — 6360000002 HC RX W HCPCS: Performed by: HOSPITALIST

## 2021-09-07 PROCEDURE — 2580000003 HC RX 258: Performed by: HOSPITALIST

## 2021-09-07 PROCEDURE — 36415 COLL VENOUS BLD VENIPUNCTURE: CPT

## 2021-09-07 PROCEDURE — 6370000000 HC RX 637 (ALT 250 FOR IP): Performed by: HOSPITALIST

## 2021-09-07 PROCEDURE — 85014 HEMATOCRIT: CPT

## 2021-09-07 PROCEDURE — 85018 HEMOGLOBIN: CPT

## 2021-09-07 RX ORDER — VITAMIN B COMPLEX
2000 TABLET ORAL DAILY
Status: DISCONTINUED | OUTPATIENT
Start: 2021-09-07 | End: 2021-09-10 | Stop reason: HOSPADM

## 2021-09-07 RX ORDER — NICOTINE POLACRILEX 4 MG
15 LOZENGE BUCCAL PRN
Status: DISCONTINUED | OUTPATIENT
Start: 2021-09-07 | End: 2021-09-10 | Stop reason: HOSPADM

## 2021-09-07 RX ORDER — ONDANSETRON 2 MG/ML
4 INJECTION INTRAMUSCULAR; INTRAVENOUS EVERY 4 HOURS PRN
Status: DISCONTINUED | OUTPATIENT
Start: 2021-09-07 | End: 2021-09-10 | Stop reason: HOSPADM

## 2021-09-07 RX ORDER — FAMOTIDINE 20 MG/1
20 TABLET, FILM COATED ORAL 2 TIMES DAILY
Status: DISCONTINUED | OUTPATIENT
Start: 2021-09-07 | End: 2021-09-10 | Stop reason: HOSPADM

## 2021-09-07 RX ORDER — DEXTROSE MONOHYDRATE 25 G/50ML
12.5 INJECTION, SOLUTION INTRAVENOUS PRN
Status: DISCONTINUED | OUTPATIENT
Start: 2021-09-07 | End: 2021-09-10 | Stop reason: HOSPADM

## 2021-09-07 RX ORDER — ONDANSETRON 4 MG/1
4 TABLET, ORALLY DISINTEGRATING ORAL EVERY 8 HOURS PRN
Status: DISCONTINUED | OUTPATIENT
Start: 2021-09-07 | End: 2021-09-10 | Stop reason: HOSPADM

## 2021-09-07 RX ORDER — ENTECAVIR 1 MG/1
0.5 TABLET, FILM COATED ORAL DAILY
Status: DISCONTINUED | OUTPATIENT
Start: 2021-09-07 | End: 2021-09-10 | Stop reason: HOSPADM

## 2021-09-07 RX ORDER — DEXTROSE MONOHYDRATE 50 MG/ML
100 INJECTION, SOLUTION INTRAVENOUS PRN
Status: DISCONTINUED | OUTPATIENT
Start: 2021-09-07 | End: 2021-09-10 | Stop reason: HOSPADM

## 2021-09-07 RX ORDER — CITALOPRAM 10 MG/1
10 TABLET ORAL NIGHTLY
Status: DISCONTINUED | OUTPATIENT
Start: 2021-09-07 | End: 2021-09-10 | Stop reason: HOSPADM

## 2021-09-07 RX ORDER — LISINOPRIL AND HYDROCHLOROTHIAZIDE 12.5; 1 MG/1; MG/1
1 TABLET ORAL EVERY EVENING
Status: DISCONTINUED | OUTPATIENT
Start: 2021-09-07 | End: 2021-09-10 | Stop reason: HOSPADM

## 2021-09-07 RX ADMIN — SODIUM CHLORIDE: 9 INJECTION, SOLUTION INTRAVENOUS at 09:41

## 2021-09-07 RX ADMIN — MYCOPHENOLATE MOFETIL 1000 MG: 250 CAPSULE ORAL at 10:07

## 2021-09-07 RX ADMIN — ENOXAPARIN SODIUM 40 MG: 40 INJECTION SUBCUTANEOUS at 09:39

## 2021-09-07 RX ADMIN — ONDANSETRON 4 MG: 2 INJECTION INTRAMUSCULAR; INTRAVENOUS at 10:50

## 2021-09-07 RX ADMIN — ONDANSETRON 4 MG: 2 INJECTION INTRAMUSCULAR; INTRAVENOUS at 20:28

## 2021-09-07 RX ADMIN — LISINOPRIL AND HYDROCHLOROTHIAZIDE 1 TABLET: 12.5; 1 TABLET ORAL at 17:41

## 2021-09-07 RX ADMIN — INSULIN LISPRO 3 UNITS: 100 INJECTION, SOLUTION INTRAVENOUS; SUBCUTANEOUS at 17:41

## 2021-09-07 RX ADMIN — MYCOPHENOLATE MOFETIL 1000 MG: 250 CAPSULE ORAL at 20:18

## 2021-09-07 RX ADMIN — Medication 2000 UNITS: at 14:47

## 2021-09-07 RX ADMIN — INSULIN LISPRO 2 UNITS: 100 INJECTION, SOLUTION INTRAVENOUS; SUBCUTANEOUS at 11:41

## 2021-09-07 RX ADMIN — HYDROMORPHONE HYDROCHLORIDE 1 MG: 1 INJECTION, SOLUTION INTRAMUSCULAR; INTRAVENOUS; SUBCUTANEOUS at 09:38

## 2021-09-07 RX ADMIN — HYDROMORPHONE HYDROCHLORIDE 1 MG: 1 INJECTION, SOLUTION INTRAMUSCULAR; INTRAVENOUS; SUBCUTANEOUS at 05:20

## 2021-09-07 RX ADMIN — ONDANSETRON 4 MG: 2 INJECTION INTRAMUSCULAR; INTRAVENOUS at 06:16

## 2021-09-07 RX ADMIN — SODIUM CHLORIDE, PRESERVATIVE FREE 10 ML: 5 INJECTION INTRAVENOUS at 20:29

## 2021-09-07 RX ADMIN — SODIUM CHLORIDE: 9 INJECTION, SOLUTION INTRAVENOUS at 22:29

## 2021-09-07 RX ADMIN — HYDROMORPHONE HYDROCHLORIDE 1 MG: 1 INJECTION, SOLUTION INTRAMUSCULAR; INTRAVENOUS; SUBCUTANEOUS at 20:18

## 2021-09-07 RX ADMIN — INSULIN LISPRO 1 UNITS: 100 INJECTION, SOLUTION INTRAVENOUS; SUBCUTANEOUS at 22:36

## 2021-09-07 RX ADMIN — ONDANSETRON 4 MG: 2 INJECTION INTRAMUSCULAR; INTRAVENOUS at 14:47

## 2021-09-07 RX ADMIN — ENTECAVIR 0.5 MG: 1 TABLET ORAL at 14:54

## 2021-09-07 RX ADMIN — CITALOPRAM HYDROBROMIDE 10 MG: 10 TABLET ORAL at 20:18

## 2021-09-07 RX ADMIN — FAMOTIDINE 20 MG: 20 TABLET, FILM COATED ORAL at 20:18

## 2021-09-07 RX ADMIN — HYDROMORPHONE HYDROCHLORIDE 1 MG: 1 INJECTION, SOLUTION INTRAMUSCULAR; INTRAVENOUS; SUBCUTANEOUS at 14:47

## 2021-09-07 ASSESSMENT — PAIN DESCRIPTION - PAIN TYPE
TYPE: ACUTE PAIN

## 2021-09-07 ASSESSMENT — PAIN DESCRIPTION - ONSET: ONSET: ON-GOING

## 2021-09-07 ASSESSMENT — PAIN DESCRIPTION - ORIENTATION: ORIENTATION: RIGHT

## 2021-09-07 ASSESSMENT — PAIN DESCRIPTION - FREQUENCY: FREQUENCY: INTERMITTENT

## 2021-09-07 ASSESSMENT — PAIN - FUNCTIONAL ASSESSMENT: PAIN_FUNCTIONAL_ASSESSMENT: PREVENTS OR INTERFERES SOME ACTIVE ACTIVITIES AND ADLS

## 2021-09-07 ASSESSMENT — PAIN DESCRIPTION - DESCRIPTORS
DESCRIPTORS: DISCOMFORT
DESCRIPTORS: ACHING
DESCRIPTORS: ACHING
DESCRIPTORS: SHARP
DESCRIPTORS: DISCOMFORT

## 2021-09-07 ASSESSMENT — PAIN SCALES - GENERAL
PAINLEVEL_OUTOF10: 4
PAINLEVEL_OUTOF10: 7
PAINLEVEL_OUTOF10: 4
PAINLEVEL_OUTOF10: 7
PAINLEVEL_OUTOF10: 7
PAINLEVEL_OUTOF10: 4
PAINLEVEL_OUTOF10: 4
PAINLEVEL_OUTOF10: 7
PAINLEVEL_OUTOF10: 3
PAINLEVEL_OUTOF10: 0

## 2021-09-07 ASSESSMENT — PAIN DESCRIPTION - LOCATION
LOCATION: FLANK

## 2021-09-07 ASSESSMENT — PAIN DESCRIPTION - PROGRESSION: CLINICAL_PROGRESSION: NOT CHANGED

## 2021-09-07 NOTE — CONSULTS
Consulting Physician: Dr. Bj Thomas    Reason for Consult: Possible right UVJ stone    History of Present Illness: Reji Bernard is a 46 y.o.  overweight female who came to the ED for sudden onset of right abdominal/flank pain with associated decreased urinary output. She has a history of right kidney transplant in 2019 at [facility]. CT scan with hyperdensity of 5mm at the right UVJ, this is difficult to tell if calcification from transplant vs kidney stone. UA with no blood, creatinine normal. No prior history of kidney stones. Treatment of the stone may be difficult due to the transplantation. She reports vomiting blood this morning.      Past Medical History:   Past Medical History:   Diagnosis Date    Anemia     Chronic kidney disease     Diabetes mellitus ([address]     Hypertension     Obesity        Past Surgical History:  Past Surgical History:   Procedure Laterality Date     SECTION  ,,    CHOLECYSTECTOMY      DIALYSIS FISTULA CREATION      ENDOSCOPY, COLON, DIAGNOSTIC  2018    Esophagogastroduodenoscopy with biopsy Colonoscopy with polypectomy (snare cautery)    HAND TENDON SURGERY      thumb- 2002    TUBAL LIGATION  3643    UMBILICAL HERNIA REPAIR         Social History:  Social History     Socioeconomic History    Marital status:      Spouse name: Not on file    Number of children: Not on file    Years of education: Not on file    Highest education level: Not on file   Occupational History    Not on file   Tobacco Use    Smoking status: Never Smoker    Smokeless tobacco: Never Used   Substance and Sexual Activity    Alcohol use: No    Drug use: No    Sexual activity: Never   Other Topics Concern    Not on file   Social History Narrative    Not on file     Social Determinants of Health     Financial Resource Strain:     Difficulty of Paying Living Expenses:    Food Insecurity:     Worried About Running Out of Food in the Last Year:  Ran Out of Food in the Last Year:    Transportation Needs:     Lack of Transportation (Medical):      Lack of Transportation (Non-Medical):    Physical Activity:     Days of Exercise per Week:     Minutes of Exercise per Session:    Stress:     Feeling of Stress :    Social Connections:     Frequency of Communication with Friends and Family:     Frequency of Social Gatherings with Friends and Family:     Attends [employer] Services:     Active Member of Clubs or Organizations:     Attends Club or Organization Meetings:     Marital Status:    Intimate Partner Violence:     Fear of Current or Ex-Partner:     Emotionally Abused:     Physically Abused:     Sexually Abused:        Family History:  Family History   Problem Relation Age of Onset    Diabetes Mother     Hypertension Mother     Cancer Maternal Grandmother        Meds:   Current Facility-Administered Medications: sodium chloride flush 0.9 % injection 5-40 mL, 5-40 mL, IntraVENous, 2 times per day  sodium chloride flush 0.9 % injection 5-40 mL, 5-40 mL, IntraVENous, PRN  0.9 % sodium chloride infusion, 25 mL, IntraVENous, PRN  enoxaparin (LOVENOX) injection 40 mg, 40 mg, SubCUTAneous, Daily  ondansetron (ZOFRAN-ODT) disintegrating tablet 4 mg, 4 mg, Oral, Q8H PRN **OR** ondansetron (ZOFRAN) injection 4 mg, 4 mg, IntraVENous, Q6H PRN  polyethylene glycol (GLYCOLAX) packet 17 g, 17 g, Oral, Daily PRN  acetaminophen (TYLENOL) tablet 650 mg, 650 mg, Oral, Q6H PRN **OR** acetaminophen (TYLENOL) suppository 650 mg, 650 mg, Rectal, Q6H PRN  0.9 % sodium chloride infusion, , IntraVENous, Continuous  tacrolimus (proGRAF) capsule 1.5 mg, 1.5 mg, Oral, BID  mycophenolate (CELLCEPT) capsule 1,000 mg, 1,000 mg, Oral, BID  HYDROmorphone (DILAUDID) injection 0.5 mg, 0.5 mg, IntraVENous, Q3H PRN  HYDROmorphone (DILAUDID) injection 1 mg, 1 mg, IntraVENous, Q4H PRN    Vitals:  BP (!) 170/99   Pulse 75   Temp 98.3 °F (36.8 °C) (Oral)   Resp 16   Ht 5' 9\" (1.753 m)   Wt 289 lb 14.5 oz (131.5 kg)   SpO2 99%   BMI 42.81 kg/m²     Intake/Output Summary (Last 24 hours) at 9/7/2021 0858  Last data filed at 9/7/2021 0747  Gross per 24 hour   Intake 1129.85 ml   Output --   Net 1129.85 ml       Review of Systems:  10 Systems were reviewed and negative except as in HPI      Physical Exam:  General Appearance: Alert and oriented, cooperative, no distress, appears stated age  Head: Normocephalic, without obvious abnormality, atraumatic  Back: no CVA tenderness  Lungs: respirations unlabored, no wheezing  Heart: Regular rate and rhythm, no lower extremity edema noted  Abdomen: Soft, RLQ tender, non-distended, no masses  Skin: Skin color, texture, turgor normal, no rashes or lesions  Neurologic: no gross deficits  Female :    Bladder is non tender   No CVA tenderness   Spontaneously voiding   Pelvic Exam Not Indicated    Labs:  CBC   Lab Results   Component Value Date    WBC 5.0 09/06/2021    RBC 4.55 09/06/2021    RBC 3.60 04/01/2016    HGB 13.1 09/06/2021    HCT 39.7 09/06/2021    MCV 87.2 09/06/2021    MCH 28.8 09/06/2021    MCHC 33.0 09/06/2021    RDW 13.7 09/06/2021     09/06/2021    MPV 8.2 09/06/2021     BMP   Lab Results   Component Value Date     09/06/2021    K 3.9 09/06/2021    K 4.3 05/15/2021    CL 99 09/06/2021    CO2 23 09/06/2021    BUN 10 09/06/2021    CREATININE 1.0 09/06/2021    GLUCOSE 228 09/06/2021    GLUCOSE 238 04/01/2016    CALCIUM 9.0 09/06/2021       Urinalysis:   Lab Results   Component Value Date    COLORU YELLOW 09/06/2021    GLUCOSEU >=1000 09/06/2021    GLUCOSEU 500 06/20/2010    BLOODU Negative 09/06/2021    NITRU Negative 09/06/2021    LEUKOCYTESUR Negative 09/06/2021       Imaging: Pertinent images and radiologist's report were reviewed independently  CT abdomen/pelvis 9/6/21  Impression   Right lower quadrant renal transplant, with 5 mm hyperdensity at the   implantation site of the transplanted ureter at the bladder.  In the absence   of history of postoperative clip in this region, findings are concerning for   calculus.  Mild pelviectasis.  Correlate with urinalysis.       4.4 cm cystic lesion along the anterior margin of the uterus, likely either   ovarian cyst or degenerative fibroid.  Ultrasound could be considered for   further characterization.       Anterior abdominal wall hernias, without evidence of bowel dilatation to   suggest obstruction. Impression/Plan:   - 41y.o. female with RLQ abdominal pain. Possible 5mm right UVJ stone. - Will continue to manage pain at this time, hopeful to pass the possible stone. If pain does not improve over the next few days, may consider a percutaneous nephrostomy drain to help treat the stone.      ARISTIDES Maki - CNP 9/7/36750:10 AM

## 2021-09-07 NOTE — PROGRESS NOTES
Physician Progress Note      PATIENT:               Renny Patterson  CSN #:                  [id number]  :                       1980  ADMIT DATE:       2021 11:42 AM  DISCH DATE:  RESPONDING  PROVIDER #:        Paulina Weeks MD          QUERY TEXT:    Dear Dr. Brittny Jean Baptiste,  Patient admitted with BMI 42.81. If possible, please document in progress   notes and discharge summary if you are evaluating and /or treating any of the   following: The medical record reflects the following:  Risk Factors: Hx DM, HTN  Clinical Indicators: BMI=42.81, 5'9\", 289 lb  Treatment:  when appropriate  Thank you,  Fernie Mcpherson RN, CDS  [email or web address]  Options provided:  -- Morbid obesity  -- Obesity  -- Severe obesity  -- Overweight  -- Other - I will add my own diagnosis  -- Disagree - Not applicable / Not valid  -- Disagree - Clinically unable to determine / Unknown  -- Refer to Clinical Documentation Reviewer    PROVIDER RESPONSE TEXT:    This patient has obesity. Query created by:  [id number]  on 2021 10:05 AM      Electronically signed by:  Paulina Weeks MD 2021 12:09 PM

## 2021-09-07 NOTE — FLOWSHEET NOTE
4 Eyes Skin Assessment     NAME:  Ji Salinas  YOB: 1980  MEDICAL RECORD NUMBER:  [id number]    The patient is being assess for  Admission    I agree that 2 RN's have performed a thorough Head to Toe Skin Assessment on the patient. ALL assessment sites listed below have been assessed. Areas assessed by both nurses:    Head, Face, Ears, Shoulders, Back, Chest, Arms, Elbows, Hands, Sacrum. Buttock, Coccyx, Ischium and Legs. Feet and Heels        Does the Patient have a Wound?  No noted wound(s)       Navdeep Prevention initiated:  NA   Wound Care Orders initiated:  NA    Pressure Injury (Stage 3,4, Unstageable, DTI, NWPT, and Complex wounds) if present place consult order under [de-identified] NA    New and Established Ostomies if present place consult order under : NA      Nurse 1 eSignature: Electronically signed by Cathryne Curling, RN on 9/7/21 at 3:29 AM EDT    **SHARE this note so that the co-signing nurse is able to place an eSignature**    Nurse 2 eSignature: Electronically signed by Magdiel Montenegro RN on 9/7/21 at 5:14 AM EDT

## 2021-09-07 NOTE — PLAN OF CARE
Problem: Pain:  Goal: Pain level will decrease  Description: Pain level will decrease  9/7/2021 1203 by Ronald Liang RN  Outcome: Ongoing  9/6/2021 2340 by Uday Irby RN  Outcome: Ongoing  Goal: Control of acute pain  Description: Control of acute pain  9/7/2021 1203 by Ronald Liang RN  Outcome: Ongoing  9/6/2021 2340 by Uday Irby RN  Outcome: Ongoing  Goal: Control of chronic pain  Description: Control of chronic pain  9/7/2021 1203 by Ronald Liang RN  Outcome: Ongoing  9/6/2021 2340 by Uday Irby RN  Outcome: Ongoing     Problem: Falls - Risk of:  Goal: Will remain free from falls  Description: Will remain free from falls  Outcome: Ongoing  Goal: Absence of physical injury  Description: Absence of physical injury  Outcome: Ongoing

## 2021-09-07 NOTE — FLOWSHEET NOTE
Patient arrived via stretcher from ED with her  with her at [address] [place] [address] in room 4108. No n/v. She has right flank pain. LMP on . Dr. Nadia Madera came in and said infection is not indicated. Pain med is manageable at this time and she just had morphine 4mg at the ER. But she said it makes her \"itch\". Doc said he will swtich her med to prn dilaudid which worked for her in th past. She is NPO at midnight due to urologist to see her in the morning and may need a procedure. Patient is calm and very alert and oriented.

## 2021-09-07 NOTE — H&P
Hospital Medicine History & Physical      PCP: Mikey Salvador    Date of Admission: 2021    Date of Service: Pt seen/examined on 2021 and Admitted to Inpatient with expected LOS greater than two midnights due to medical therapy. Chief Complaint:  R sided flank pain. History Of Present Illness:       46 y.o. female woke up this am with R flank pain. She denies fever, chills, though she felt fatigues since yesterday evening. She denies cough, sob, change in her chronic diarrhea. Pain was a dull ache which recurred associated with urinary hesitancy. She denies dysuria, dec urinary frequency, blood in urine. Past Medical History:          Diagnosis Date    Anemia     Chronic kidney disease     Diabetes mellitus ([address]     Hypertension     Obesity        Past Surgical History:          Procedure Laterality Date     SECTION  ,,    CHOLECYSTECTOMY      DIALYSIS FISTULA CREATION      ENDOSCOPY, COLON, DIAGNOSTIC  2018    Esophagogastroduodenoscopy with biopsy Colonoscopy with polypectomy (snare cautery)    HAND TENDON SURGERY      thumb-     TUBAL LIGATION  7531    UMBILICAL HERNIA REPAIR         Medications Prior to Admission:      Prior to Admission medications    Medication Sig Start Date End Date Taking?  Authorizing Provider   Dulaglutide (TRULICITY) 1.5 UJ/0.0BO SOPN Inject 1.5 mg into the skin once a week On    Yes Historical Provider, MD   entecavir (BARACLUDE) 0.5 MG tablet Take 0.5 mg by mouth daily   Yes Historical Provider, MD   mycophenolate (CELLCEPT) 250 MG capsule Take 250 mg by mouth 2 times daily 4 capsules BID, 1000 and 2200   Yes Historical Provider, MD   tacrolimus (PROGRAF) 1 MG capsule Take 1.5 mg by mouth 2 times daily   Yes Historical Provider, MD   citalopram (CELEXA) 10 MG tablet Take 10 mg by mouth nightly   Yes Historical Provider, MD   Cholecalciferol (VITAMIN D3) 2000 UNITS TABS  3/11/16  Yes Historical Provider, MD acetaminophen (TYLENOL) 325 MG tablet Take 650 mg by mouth every 6 hours as needed for Pain   Yes Historical Provider, MD   lisinopril-hydrochlorothiazide (PRINZIDE;ZESTORETIC) 10-12.5 MG per tablet Take 1 tablet by mouth every evening    Yes Historical Provider, MD   furosemide (LASIX) 80 MG tablet Take 80 mg by mouth daily    Historical Provider, MD       Allergies:  Hydrocodone-acetaminophen, Iv dye [iodides], and Naproxen    Social History:         TOBACCO:   reports that she has never smoked. She has never used smokeless tobacco.  ETOH:   reports no history of alcohol use. Family History:               Problem Relation Age of Onset    Diabetes Mother     Hypertension Mother     Cancer Maternal Grandmother        REVIEW OF SYSTEMS:   Pertinent positives as noted in the HPI. All other systems reviewed and negative. PHYSICAL EXAM PERFORMED:    /71   Pulse 94   Temp 98.3 °F (36.8 °C) (Oral)   Resp 18   Ht 5' 9\" (1.753 m)   Wt 290 lb (131.5 kg)   SpO2 98%   BMI 42.83 kg/m²     General appearance:  No apparent distress, appears stated age and cooperative. HEENT:  Normal cephalic, atraumatic without obvious deformity. Pupils equal, round, Extra ocular muscles intact. Conjunctivae/corneas clear. Neck: Supple, with full range of motion. No jugular venous distention. Trachea midline. Respiratory:  Normal respiratory effort. No use of accessory muscles, no intercostal retractions  Cardiovascular:  Regular rate and rhythm    Abdomen: Soft, non-tender, non-distended    Musculoskeletal:  No clubbing, cyanosis or edema bilaterally.      Skin: Skin color, texture, turgor normal.     Neurologic:   grossly non-focal.  Psychiatric:  Alert and oriented, thought content appropriate, normal insight         Labs:     Recent Labs     09/06/21  1254   WBC 5.0   HGB 13.1   HCT 39.7        Recent Labs     09/06/21  1254      K 3.9   CL 99   CO2 23   BUN 10   CREATININE 1.0   CALCIUM 9.0     Recent Labs     09/06/21  1254   AST 23   ALT 25   BILITOT 0.5   ALKPHOS 61     No results for input(s): INR in the last 72 hours. No results for input(s): Josh Dec in the last 72 hours. Urinalysis:      Lab Results   Component Value Date    NITRU Negative 09/06/2021    WBCUA 5 09/06/2021    BACTERIA 1+ 09/06/2021    RBCUA 0 09/06/2021    BLOODU Negative 09/06/2021    SPECGRAV 1.015 09/06/2021    GLUCOSEU >=1000 09/06/2021    GLUCOSEU 500 06/20/2010       Radiology:          CT ABDOMEN PELVIS WO CONTRAST Additional Contrast? None   Final Result   Right lower quadrant renal transplant, with 5 mm hyperdensity at the   implantation site of the transplanted ureter at the bladder. In the absence   of history of postoperative clip in this region, findings are concerning for   calculus. Mild pelviectasis. Correlate with urinalysis. 4.4 cm cystic lesion along the anterior margin of the uterus, likely either   ovarian cyst or degenerative fibroid. Ultrasound could be considered for   further characterization. Anterior abdominal wall hernias, without evidence of bowel dilatation to   suggest obstruction. ASSESSMENT:    Active Hospital Problems    Diagnosis Date Noted    Obstructive uropathy [N13.9] 09/06/2021         PLAN:    Obs uropathy  - prn IV pain / anti emetics  - Urology consult    S/p Renal transplant  - continue cellcept/ prograf, UC transplant team aware of admission      DVT Prophylaxis: lovenox  Diet: Diet NPO  Code Status: Full Code          [place] - Walter Keith MD    Thank you Karson Min for the opportunity to be involved in this patient's care. If you have any questions or concerns please feel free to contact me at [phone].

## 2021-09-07 NOTE — PROGRESS NOTES
Hospitalist Progress Note      PCP: Inga Sanches    Date of Admission: 9/6/2021    Chief Complaint: R flank pain    Hospital Course: 41f with history of R renal transplant, DM, obesity, admitted with non obstructive nephrolithiasis on R. Developed n/v following dilaudid    Subjective: (+) emesis x 3, assoc with dilaudid, denies epigastric pain, but notes blood in emesis      Medications:  Reviewed    Infusion Medications    dextrose      sodium chloride      sodium chloride 75 mL/hr at 09/07/21 0941     Scheduled Medications    insulin lispro  0-6 Units SubCUTAneous TID WC    insulin lispro  0-3 Units SubCUTAneous Nightly    Vitamin D  2,000 Units Oral Daily    citalopram  10 mg Oral Nightly    entecavir  0.5 mg Oral Daily    lisinopril-hydroCHLOROthiazide  1 tablet Oral QPM    sodium chloride flush  5-40 mL IntraVENous 2 times per day    enoxaparin  40 mg SubCUTAneous Daily    tacrolimus  1.5 mg Oral BID    mycophenolate  1,000 mg Oral BID     PRN Meds: ondansetron **OR** ondansetron, glucose, dextrose, glucagon (rDNA), dextrose, sodium chloride flush, sodium chloride, polyethylene glycol, acetaminophen **OR** acetaminophen, HYDROmorphone, HYDROmorphone      Intake/Output Summary (Last 24 hours) at 9/7/2021 1300  Last data filed at 9/7/2021 1154  Gross per 24 hour   Intake 1129.85 ml   Output 1000 ml   Net 129.85 ml       Physical Exam Performed:    BP (!) 173/100   Pulse 80   Temp 98.4 °F (36.9 °C) (Oral)   Resp 16   Ht 5' 9\" (1.753 m)   Wt 289 lb 14.5 oz (131.5 kg)   SpO2 99%   BMI 42.81 kg/m²     General appearance: No apparent distress, appears stated age and cooperative. HEENT: Pupils equal, round, and reactive to light. Conjunctivae/corneas clear. Neck: Supple, with full range of motion. No jugular venous distention. Trachea midline. Respiratory:  Normal respiratory effort.  No use of accessory muscles, no intercostal retractions    Cardiovascular: Regular rate and rhythm Abdomen: Soft, non-tender, non-distended    Musculoskeletal: No clubbing, cyanosis or edema bilaterally. Labs:   Recent Labs     09/06/21  1254   WBC 5.0   HGB 13.1   HCT 39.7        Recent Labs     09/06/21  1254      K 3.9   CL 99   CO2 23   BUN 10   CREATININE 1.0   CALCIUM 9.0     Recent Labs     09/06/21  1254   AST 23   ALT 25   BILITOT 0.5   ALKPHOS 61     No results for input(s): INR in the last 72 hours. No results for input(s): Chan Mallory in the last 72 hours. Urinalysis:      Lab Results   Component Value Date    NITRU Negative 09/06/2021    WBCUA 5 09/06/2021    BACTERIA 1+ 09/06/2021    RBCUA 0 09/06/2021    BLOODU Negative 09/06/2021    SPECGRAV 1.015 09/06/2021    GLUCOSEU >=1000 09/06/2021    GLUCOSEU 500 06/20/2010       Radiology:  CT ABDOMEN PELVIS WO CONTRAST Additional Contrast? None   Final Result   Right lower quadrant renal transplant, with 5 mm hyperdensity at the   implantation site of the transplanted ureter at the bladder. In the absence   of history of postoperative clip in this region, findings are concerning for   calculus. Mild pelviectasis. Correlate with urinalysis. 4.4 cm cystic lesion along the anterior margin of the uterus, likely either   ovarian cyst or degenerative fibroid. Ultrasound could be considered for   further characterization. Anterior abdominal wall hernias, without evidence of bowel dilatation to   suggest obstruction. Assessment/Plan:    Active Hospital Problems    Diagnosis Date Noted    Obstructive uropathy [N13.9] 09/06/2021     1) Nephrolithiasis  - repeat renal  - no signs infxn  - prn IV dilaudid / Synetta Ebbs  - mgmt as per Urology    2) DM  - corrective ISS    3)  Hematemesis  - history of gastric sleeve, hiatal hernia  - trend hgb      4) renal transplant  - continuing home meds, UC transplant team aware of admission    DVT Prophylaxis: lovenox  Diet: ADULT DIET;  Regular; 4 carb choices (60 gm/meal)  Code Status: Full Emmanuel Lemons MD

## 2021-09-07 NOTE — FLOWSHEET NOTE
Patient called the nurse in to her room to report she just had emesis x 1. Her emesis of 20ml is greenish in color with a tinge of blood which concerned her. She said she does get acid reflux in the early a.m. and usually takes pepcid at night to manage this. Patient given 4mg of ondansetron IV injection. Patient had a prn Dilaudid 1mg IV injection less than two hours prior to this emesis episode but she has tolerated this same dose of medicine when she got it at bedtime. Messaged Dr. Zaida Lopez on-call md to get a pepcid order and also to relay patient's concern about the color of her emesis. Continue to monitor this patient. Urologist to see her in the a.m. to address her admission diagnoses (5mm kidney stone and right flank pain) No fever, VS WNL this morning.

## 2021-09-07 NOTE — FLOWSHEET NOTE
Patients medications that she brought from home were picked up by pharmacist at [address] [place] [address] filled out. Patient given a copy and another copy kept in her soft chart. She is reminded to  her home meds upon discharge.

## 2021-09-07 NOTE — CARE COORDINATION
INITIAL CASE MANAGEMENT ASSESSMENT    Reviewed chart, met with patient to assess possible discharge needs. Explained Case Management role/services. SW interviewed patient and  at bedside today. Patient gave this writer permission to speak freely in front of family. Living Situation: Patient reports that she resides in a single family  Home with her , three children and two dogs. There is entry level admission. Prior to this hospital stay patient reports that she had no trouble getting in and out of the property. ADLs: Prior to medical admission patient reports that she was independent. She stated that she doesn't anticipate any needs at discharge. DME: Prior to medical admission patient reports that she used no durable medical equipment. She stated that she doesn't anticipate any needs at discharge. PT/OT Recs: Not ordered. Active Services: Prior to medical admission patient reports that she had no active services in place. She stated that she doesn't anticipate any needs at discharge. Transportation: Prior to medical admission patient reports that she was an active . She stated that her  will likely transport her home at discharge. Medications: Patient receives medications from [address] [place] [address]. At this time she reports no issues with access or affordability. PCP: Tiffany Chisholm [phone] (phone) and [phone] (fax number). Patient reports that her last appointment with this provider was in  April of 2021. HD/PD: N/A    PLAN/COMMENTS:   1) Monitor for urology clearance, may require surgery. 2) FMLA paperwork to be expected prior to discharge. SW provided contact information for patient or family to call with any questions. SW will follow and assist as needed. Respectfully submitted,    DM BoltonS  [facility]   [phone]    Electronically signed by KATHY Joseph on 9/7/2021 at 1:25 PM

## 2021-09-08 LAB
ALBUMIN SERPL-MCNC: 3.8 G/DL (ref 3.4–5)
ANION GAP SERPL CALCULATED.3IONS-SCNC: 9 MMOL/L (ref 3–16)
BASOPHILS ABSOLUTE: 0 K/UL (ref 0–0.2)
BASOPHILS RELATIVE PERCENT: 0.8 %
BUN BLDV-MCNC: 7 MG/DL (ref 7–20)
CALCIUM SERPL-MCNC: 9.1 MG/DL (ref 8.3–10.6)
CHLORIDE BLD-SCNC: 99 MMOL/L (ref 99–110)
CO2: 27 MMOL/L (ref 21–32)
CREAT SERPL-MCNC: 0.9 MG/DL (ref 0.6–1.1)
EOSINOPHILS ABSOLUTE: 0.1 K/UL (ref 0–0.6)
EOSINOPHILS RELATIVE PERCENT: 1.7 %
GFR AFRICAN AMERICAN: >60
GFR NON-AFRICAN AMERICAN: >60
GLUCOSE BLD-MCNC: 147 MG/DL (ref 70–99)
GLUCOSE BLD-MCNC: 161 MG/DL (ref 70–99)
GLUCOSE BLD-MCNC: 180 MG/DL (ref 70–99)
GLUCOSE BLD-MCNC: 277 MG/DL (ref 70–99)
HCT VFR BLD CALC: 39.5 % (ref 36–48)
HEMOGLOBIN: 13 G/DL (ref 12–16)
LYMPHOCYTES ABSOLUTE: 0.9 K/UL (ref 1–5.1)
LYMPHOCYTES RELATIVE PERCENT: 15.2 %
MCH RBC QN AUTO: 29 PG (ref 26–34)
MCHC RBC AUTO-ENTMCNC: 32.9 G/DL (ref 31–36)
MCV RBC AUTO: 88.1 FL (ref 80–100)
MONOCYTES ABSOLUTE: 0.6 K/UL (ref 0–1.3)
MONOCYTES RELATIVE PERCENT: 11.1 %
NEUTROPHILS ABSOLUTE: 4.2 K/UL (ref 1.7–7.7)
NEUTROPHILS RELATIVE PERCENT: 71.2 %
PDW BLD-RTO: 13.9 % (ref 12.4–15.4)
PERFORMED ON: ABNORMAL
PHOSPHORUS: 2.4 MG/DL (ref 2.5–4.9)
PLATELET # BLD: 195 K/UL (ref 135–450)
PMV BLD AUTO: 8.1 FL (ref 5–10.5)
POTASSIUM SERPL-SCNC: 4 MMOL/L (ref 3.5–5.1)
RBC # BLD: 4.48 M/UL (ref 4–5.2)
SODIUM BLD-SCNC: 135 MMOL/L (ref 136–145)
WBC # BLD: 5.9 K/UL (ref 4–11)

## 2021-09-08 PROCEDURE — 6370000000 HC RX 637 (ALT 250 FOR IP): Performed by: HOSPITALIST

## 2021-09-08 PROCEDURE — 1200000000 HC SEMI PRIVATE

## 2021-09-08 PROCEDURE — 83036 HEMOGLOBIN GLYCOSYLATED A1C: CPT

## 2021-09-08 PROCEDURE — 36415 COLL VENOUS BLD VENIPUNCTURE: CPT

## 2021-09-08 PROCEDURE — 85025 COMPLETE CBC W/AUTO DIFF WBC: CPT

## 2021-09-08 PROCEDURE — 94761 N-INVAS EAR/PLS OXIMETRY MLT: CPT

## 2021-09-08 PROCEDURE — 80069 RENAL FUNCTION PANEL: CPT

## 2021-09-08 PROCEDURE — 6360000002 HC RX W HCPCS: Performed by: HOSPITALIST

## 2021-09-08 PROCEDURE — 2580000003 HC RX 258: Performed by: HOSPITALIST

## 2021-09-08 RX ADMIN — MYCOPHENOLATE MOFETIL 1000 MG: 250 CAPSULE ORAL at 21:53

## 2021-09-08 RX ADMIN — FAMOTIDINE 20 MG: 20 TABLET, FILM COATED ORAL at 09:52

## 2021-09-08 RX ADMIN — ONDANSETRON 4 MG: 2 INJECTION INTRAMUSCULAR; INTRAVENOUS at 05:48

## 2021-09-08 RX ADMIN — FAMOTIDINE 20 MG: 20 TABLET, FILM COATED ORAL at 21:53

## 2021-09-08 RX ADMIN — CITALOPRAM HYDROBROMIDE 10 MG: 10 TABLET ORAL at 21:53

## 2021-09-08 RX ADMIN — LISINOPRIL AND HYDROCHLOROTHIAZIDE 1 TABLET: 12.5; 1 TABLET ORAL at 17:06

## 2021-09-08 RX ADMIN — ONDANSETRON 4 MG: 2 INJECTION INTRAMUSCULAR; INTRAVENOUS at 11:05

## 2021-09-08 RX ADMIN — HYDROMORPHONE HYDROCHLORIDE 0.5 MG: 1 INJECTION, SOLUTION INTRAMUSCULAR; INTRAVENOUS; SUBCUTANEOUS at 11:05

## 2021-09-08 RX ADMIN — HYDROMORPHONE HYDROCHLORIDE 0.5 MG: 1 INJECTION, SOLUTION INTRAMUSCULAR; INTRAVENOUS; SUBCUTANEOUS at 05:49

## 2021-09-08 RX ADMIN — SODIUM CHLORIDE: 9 INJECTION, SOLUTION INTRAVENOUS at 12:43

## 2021-09-08 RX ADMIN — ONDANSETRON 4 MG: 2 INJECTION INTRAMUSCULAR; INTRAVENOUS at 15:20

## 2021-09-08 RX ADMIN — ENTECAVIR 0.5 MG: 1 TABLET ORAL at 09:53

## 2021-09-08 RX ADMIN — HYDROMORPHONE HYDROCHLORIDE 1 MG: 1 INJECTION, SOLUTION INTRAMUSCULAR; INTRAVENOUS; SUBCUTANEOUS at 15:15

## 2021-09-08 RX ADMIN — Medication 2000 UNITS: at 09:52

## 2021-09-08 RX ADMIN — INSULIN LISPRO 1 UNITS: 100 INJECTION, SOLUTION INTRAVENOUS; SUBCUTANEOUS at 21:57

## 2021-09-08 RX ADMIN — ENOXAPARIN SODIUM 40 MG: 40 INJECTION SUBCUTANEOUS at 09:52

## 2021-09-08 RX ADMIN — SODIUM CHLORIDE, PRESERVATIVE FREE 10 ML: 5 INJECTION INTRAVENOUS at 21:57

## 2021-09-08 RX ADMIN — HYDROMORPHONE HYDROCHLORIDE 1 MG: 1 INJECTION, SOLUTION INTRAMUSCULAR; INTRAVENOUS; SUBCUTANEOUS at 19:48

## 2021-09-08 RX ADMIN — INSULIN LISPRO 3 UNITS: 100 INJECTION, SOLUTION INTRAVENOUS; SUBCUTANEOUS at 17:06

## 2021-09-08 RX ADMIN — ONDANSETRON 4 MG: 2 INJECTION INTRAMUSCULAR; INTRAVENOUS at 19:48

## 2021-09-08 RX ADMIN — MYCOPHENOLATE MOFETIL 1000 MG: 250 CAPSULE ORAL at 09:53

## 2021-09-08 ASSESSMENT — PAIN DESCRIPTION - ONSET
ONSET: ON-GOING

## 2021-09-08 ASSESSMENT — PAIN SCALES - GENERAL
PAINLEVEL_OUTOF10: 2
PAINLEVEL_OUTOF10: 8
PAINLEVEL_OUTOF10: 0
PAINLEVEL_OUTOF10: 6
PAINLEVEL_OUTOF10: 7
PAINLEVEL_OUTOF10: 8

## 2021-09-08 ASSESSMENT — PAIN DESCRIPTION - LOCATION
LOCATION: FLANK
LOCATION: BACK;FLANK;PELVIS
LOCATION: BACK;PELVIS

## 2021-09-08 ASSESSMENT — PAIN DESCRIPTION - PAIN TYPE
TYPE: ACUTE PAIN

## 2021-09-08 ASSESSMENT — PAIN DESCRIPTION - DESCRIPTORS
DESCRIPTORS: ACHING;DISCOMFORT
DESCRIPTORS: DISCOMFORT
DESCRIPTORS: ACHING;CRAMPING

## 2021-09-08 ASSESSMENT — PAIN - FUNCTIONAL ASSESSMENT
PAIN_FUNCTIONAL_ASSESSMENT: PREVENTS OR INTERFERES SOME ACTIVE ACTIVITIES AND ADLS

## 2021-09-08 ASSESSMENT — PAIN DESCRIPTION - PROGRESSION
CLINICAL_PROGRESSION: NOT CHANGED
CLINICAL_PROGRESSION: NOT CHANGED

## 2021-09-08 ASSESSMENT — PAIN DESCRIPTION - ORIENTATION
ORIENTATION: RIGHT
ORIENTATION: RIGHT;MID;LOWER
ORIENTATION: RIGHT;LOWER;MID

## 2021-09-08 ASSESSMENT — PAIN DESCRIPTION - FREQUENCY
FREQUENCY: INTERMITTENT

## 2021-09-08 NOTE — DISCHARGE SUMMARY
[facility]   Discharge Summary    Patient:  Rasheed Lopez  YOB: 1980    MRN: [id number]   Acct: [de-identified]    Primary Care Physician: Mitzy Ray    Admit date:  9/6/2021    Discharge date:   9/8/2021      Discharge Diagnoses: Active Problems:    Obstructive uropathy    DM    S/P R Renal transplant            Admitted for: (HPI) R flank pain    Hospital Course: 41f with history of R renal transplant, admitted with 5 mm stone at junction between transplanted ureter and bladder. Renal function normal, no evidence of infection. Has had continued ureteral colic requiring prn IV dilaudid for relief. She's had some n/v associated with dilaudid, improved with zofran.           Discharge Medications:       Medication List      CONTINUE taking these medications    acetaminophen 325 MG tablet  Commonly known as: TYLENOL     CeleXA 10 MG tablet  Generic drug: citalopram     entecavir 0.5 MG tablet  Commonly known as: BARACLUDE     furosemide 80 MG tablet  Commonly known as: LASIX     lisinopril-hydroCHLOROthiazide 10-12.5 MG per tablet  Commonly known as: PRINZIDE;ZESTORETIC     mycophenolate 250 MG capsule  Commonly known as: CELLCEPT     tacrolimus 1 MG capsule  Commonly known as: PROGRAF     Trulicity 1.5 RS/3.2EF Sopn  Generic drug: Dulaglutide     Vitamin D3 50 MCG (2000 UT) Tabs              Physical Exam:    Vitals:  Vitals:    09/07/21 1221 09/07/21 2230 09/08/21 0556 09/08/21 0957   BP:  (!) 145/89 (!) 145/83 (!) 161/90   Pulse:  84 73 91   Resp:  18 16 16   Temp:  98 °F (36.7 °C) 97.7 °F (36.5 °C) 97.9 °F (36.6 °C)   TempSrc:  Oral Oral Oral   SpO2: 99% 95% 100% 99%   Weight:   289 lb 11 oz (131.4 kg)    Height:         Weight: Weight: 289 lb 11 oz (131.4 kg)     24 hour intake/output:    Intake/Output Summary (Last 24 hours) at 9/8/2021 1313  Last data filed at 9/8/2021 0748  Gross per 24 hour   Intake --   Output 1350 ml   Net -1350 ml       General appearance - alert, well appearing, and in no distress  Chest - no use of accessory muscles, no intercostal retractions, symmetric air entry  Heart - normal rate, regular rhythm   Abdomen - soft,  nondistended    Obese: Yes; Protuberant: Yes   Neurological - alert, oriented, normal speech, no focal findings or movement disorder noted  Extremities - peripheral pulses normal, no pedal edema, no clubbing or cyanosis  Skin - normal coloration and turgor   Radiology reports as per the Radiologist  Radiology: CT ABDOMEN PELVIS WO CONTRAST Additional Contrast? None    Result Date: 9/6/2021  EXAMINATION: CT OF THE ABDOMEN AND PELVIS WITHOUT CONTRAST 9/6/2021 2:23 pm TECHNIQUE: CT of the abdomen and pelvis was performed without the administration of intravenous contrast. Multiplanar reformatted images are provided for review. Dose modulation, iterative reconstruction, and/or weight based adjustment of the mA/kV was utilized to reduce the radiation dose to as low as reasonably achievable. COMPARISON: None. HISTORY: ORDERING SYSTEM PROVIDED HISTORY: right flank pain, hx renal transplant TECHNOLOGIST PROVIDED HISTORY: Reason for exam:->right flank pain, hx renal transplant Additional Contrast?->None Decision Support Exception - unselect if not a suspected or confirmed emergency medical condition->Emergency Medical Condition (MA) Is the patient pregnant?->No Reason for Exam: right flank pain, hx renal transplant Acuity: Acute Type of Exam: Initial FINDINGS: Lower Chest: Basilar atelectasis. Organs: Lack of IV and oral contrast limits sensitivity for visceral organ pathology. No calculi within the native kidneys. No hydronephrosis. Native kidneys are small. Fluid density lesion at the lower pole of the right kidney measuring 1.6 cm, likely cyst.  No calculi within the native ureters. Right lower quadrant renal transplant is seen with mild pelviectasis.   5 mm hyperdensity is seen at the junction of the transplanted ureter and the bladder, as seen on series 2, image 175. Status post cholecystectomy. Fatty liver. Left hepatic lobe blends imperceptibly with the spleen. Postoperative change of stomach. No pancreatic stranding. Adrenal glands are within normal limits. Abdominal aorta is within normal limits in caliber. GI/Bowel: Appendix is within normal limits in caliber. Loops of small and large bowel are nondilated. Ventral hernia containing fat and portion of transverse colon, though without evidence of bowel dilatation to suggest obstruction. Smaller hernia lateral to the right rectus muscle. Pelvis: Bladder is distended. Pelvic phleboliths. Uterus is present. 3.1 x 4.4 cm cystic lesion is seen along the anterior margin of the uterine fundus, potentially degenerative fibroid or adnexal cyst adjacent to the uterus. No inguinal adenopathy. Peritoneum/Retroperitoneum: No free air. Bones/Soft Tissues: No acute process. Right lower quadrant renal transplant, with 5 mm hyperdensity at the implantation site of the transplanted ureter at the bladder. In the absence of history of postoperative clip in this region, findings are concerning for calculus. Mild pelviectasis. Correlate with urinalysis. 4.4 cm cystic lesion along the anterior margin of the uterus, likely either ovarian cyst or degenerative fibroid. Ultrasound could be considered for further characterization. Anterior abdominal wall hernias, without evidence of bowel dilatation to suggest obstruction.         Results for orders placed or performed during the hospital encounter of 09/06/21   CBC Auto Differential   Result Value Ref Range    WBC 5.0 4.0 - 11.0 K/uL    RBC 4.55 4.00 - 5.20 M/uL    Hemoglobin 13.1 12.0 - 16.0 g/dL    Hematocrit 39.7 36.0 - 48.0 %    MCV 87.2 80.0 - 100.0 fL    MCH 28.8 26.0 - 34.0 pg    MCHC 33.0 31.0 - 36.0 g/dL    RDW 13.7 12.4 - 15.4 %    Platelets 389 201 - 044 K/uL    MPV 8.2 5.0 - 10.5 fL    Neutrophils % 71.2 %    Lymphocytes % 16.8 %    Monocytes % 9.8 % Eosinophils % 1.5 %    Basophils % 0.7 %    Neutrophils Absolute 3.6 1.7 - 7.7 K/uL    Lymphocytes Absolute 0.8 (L) 1.0 - 5.1 K/uL    Monocytes Absolute 0.5 0.0 - 1.3 K/uL    Eosinophils Absolute 0.1 0.0 - 0.6 K/uL    Basophils Absolute 0.0 0.0 - 0.2 K/uL   Comprehensive Metabolic Panel   Result Value Ref Range    Sodium 136 136 - 145 mmol/L    Potassium 3.9 3.5 - 5.1 mmol/L    Chloride 99 99 - 110 mmol/L    CO2 23 21 - 32 mmol/L    Anion Gap 14 3 - 16    Glucose 228 (H) 70 - 99 mg/dL    BUN 10 7 - 20 mg/dL    CREATININE 1.0 0.6 - 1.1 mg/dL    GFR Non-African American >60 >60    GFR African American >60 >60    Calcium 9.0 8.3 - 10.6 mg/dL    Total Protein 7.0 6.4 - 8.2 g/dL    Albumin 3.8 3.4 - 5.0 g/dL    Albumin/Globulin Ratio 1.2 1.1 - 2.2    Total Bilirubin 0.5 0.0 - 1.0 mg/dL    Alkaline Phosphatase 61 40 - 129 U/L    ALT 25 10 - 40 U/L    AST 23 15 - 37 U/L    Globulin 3.2 g/dL   Lipase   Result Value Ref Range    Lipase 37.0 13.0 - 60.0 U/L   Lactic Acid, Plasma   Result Value Ref Range    Lactic Acid 1.8 0.4 - 2.0 mmol/L   Urine, reflex to culture    Specimen: Urine, clean catch   Result Value Ref Range    Color, UA YELLOW Straw/Yellow    Clarity, UA CLOUDY (A) Clear    Glucose, Ur >=1000 (A) Negative mg/dL    Bilirubin Urine Negative Negative    Ketones, Urine Negative Negative mg/dL    Specific Gravity, UA 1.015 1.005 - 1.030    Blood, Urine Negative Negative    pH, UA 6.5 5.0 - 8.0    Protein, UA 30 (A) Negative mg/dL    Urobilinogen, Urine 0.2 <2.0 E.U./dL    Nitrite, Urine Negative Negative    Leukocyte Esterase, Urine Negative Negative    Microscopic Examination YES     Urine Type Other     Urine Reflex to Culture Not Indicated    Pregnancy, Urine   Result Value Ref Range    HCG(Urine) Pregnancy Test Negative Detects HCG level >20 MIU/mL   Microscopic Urinalysis   Result Value Ref Range    Bacteria, UA 1+ (A) None Seen /HPF    Hyaline Casts, UA 0 0 - 8 /LPF    WBC, UA 5 0 - 5 /HPF    RBC, UA 0 0 - 4 /HPF    Epithelial Cells, UA 7 (H) 0 - 5 /HPF   Hemoglobin and Hematocrit, Blood   Result Value Ref Range    Hemoglobin 13.5 12.0 - 16.0 g/dL    Hematocrit 40.9 36.0 - 48.0 %   CBC Auto Differential   Result Value Ref Range    WBC 5.9 4.0 - 11.0 K/uL    RBC 4.48 4.00 - 5.20 M/uL    Hemoglobin 13.0 12.0 - 16.0 g/dL    Hematocrit 39.5 36.0 - 48.0 %    MCV 88.1 80.0 - 100.0 fL    MCH 29.0 26.0 - 34.0 pg    MCHC 32.9 31.0 - 36.0 g/dL    RDW 13.9 12.4 - 15.4 %    Platelets 121 449 - 408 K/uL    MPV 8.1 5.0 - 10.5 fL    Neutrophils % 71.2 %    Lymphocytes % 15.2 %    Monocytes % 11.1 %    Eosinophils % 1.7 %    Basophils % 0.8 %    Neutrophils Absolute 4.2 1.7 - 7.7 K/uL    Lymphocytes Absolute 0.9 (L) 1.0 - 5.1 K/uL    Monocytes Absolute 0.6 0.0 - 1.3 K/uL    Eosinophils Absolute 0.1 0.0 - 0.6 K/uL    Basophils Absolute 0.0 0.0 - 0.2 K/uL   Renal Function Panel   Result Value Ref Range    Sodium 135 (L) 136 - 145 mmol/L    Potassium 4.0 3.5 - 5.1 mmol/L    Chloride 99 99 - 110 mmol/L    CO2 27 21 - 32 mmol/L    Anion Gap 9 3 - 16    Glucose 180 (H) 70 - 99 mg/dL    BUN 7 7 - 20 mg/dL    CREATININE 0.9 0.6 - 1.1 mg/dL    GFR Non-African American >60 >60    GFR African American >60 >60    Calcium 9.1 8.3 - 10.6 mg/dL    Phosphorus 2.4 (L) 2.5 - 4.9 mg/dL    Albumin 3.8 3.4 - 5.0 g/dL   POCT Glucose   Result Value Ref Range    POC Glucose 227 (H) 70 - 99 mg/dl    Performed on ACCU-CHEK    POCT Glucose   Result Value Ref Range    POC Glucose 216 (H) 70 - 99 mg/dl    Performed on ACCU-CHEK    POCT Glucose   Result Value Ref Range    POC Glucose 235 (H) 70 - 99 mg/dl    Performed on ACCU-CHEK    POCT Glucose   Result Value Ref Range    POC Glucose 258 (H) 70 - 99 mg/dl    Performed on ACCU-CHEK    POCT Glucose   Result Value Ref Range    POC Glucose 213 (H) 70 - 99 mg/dl    Performed on ACCU-CHEK    POCT Glucose   Result Value Ref Range    POC Glucose 161 (H) 70 - 99 mg/dl    Performed on ACCU-CHEK        Diet:  ADULT DIET;

## 2021-09-08 NOTE — PROGRESS NOTES
Made MD aware IR will not be placing stent. Recommended transfer to . No new orders at this time.     Electronically signed by Joshua Hodgkins, RN on 9/8/2021 at 11:17 AM

## 2021-09-08 NOTE — PROGRESS NOTES
Pt Name: Willie Brown  Medical Record Number: [id number]  Date of Birth 1980   Today's Date: 9/8/2021      Subjective:  Awake in bed, continues to have severe pain to right abdomen and vomiting overnight. Has not eaten as she would like to proceed with procedure today. ROS: Constitutional: No fever    Vitals:  Vitals:    09/07/21 1152 09/07/21 1221 09/07/21 2230 09/08/21 0556   BP: (!) 173/100  (!) 145/89 (!) 145/83   Pulse: 80  84 73   Resp: 16  18 16   Temp: 98.4 °F (36.9 °C)  98 °F (36.7 °C) 97.7 °F (36.5 °C)   TempSrc: Oral  Oral Oral   SpO2: 100% 99% 95% 100%   Weight:    289 lb 11 oz (131.4 kg)   Height:         I/O last 3 completed shifts:   In: 649.9 [I.V.:649.9]  Out: 1350 [Urine:1350]    Exam:  General: Awake, oriented, no acute distress  Respiratory: Nonlabored breathing  Abdomen: Soft, RLQ-tender, non-distended, no masses  : spontaneously  voiding  Skin: Skin color, texture, turgor normal, no rashes or lesions  Neurologic: no gross deficits    CURRENT MEDICATIONS   Scheduled Meds:   insulin lispro  0-6 Units SubCUTAneous TID WC    insulin lispro  0-3 Units SubCUTAneous Nightly    Vitamin D  2,000 Units Oral Daily    citalopram  10 mg Oral Nightly    entecavir  0.5 mg Oral Daily    lisinopril-hydroCHLOROthiazide  1 tablet Oral QPM    famotidine  20 mg Oral BID    sodium chloride flush  5-40 mL IntraVENous 2 times per day    enoxaparin  40 mg SubCUTAneous Daily    tacrolimus  1.5 mg Oral BID    mycophenolate  1,000 mg Oral BID     Continuous Infusions:   dextrose      sodium chloride      sodium chloride 75 mL/hr at 09/07/21 2229     PRN Meds:.ondansetron **OR** ondansetron, glucose, dextrose, glucagon (rDNA), dextrose, sodium chloride flush, sodium chloride, polyethylene glycol, acetaminophen **OR** acetaminophen, HYDROmorphone, HYDROmorphone    LABS     Recent Labs     09/06/21  1254 09/07/21  1609 09/08/21  0541   WBC 5.0  --  5.9   HGB 13.1 13.5 13.0   HCT 39.7 40.9 39.5     --  195     --  135*   K 3.9  --  4.0   CL 99  --  99   CO2 23  --  27   BUN 10  --  7   CREATININE 1.0  --  0.9   PHOS  --   --  2.4*   CALCIUM 9.0  --  9.1   AST 23  --   --    ALT 25  --   --    BILITOT 0.5  --   --      ASSESSMENT   1. Hospital day # 2  2. 41y.o. female with RLQ abdominal pain, prior right renal transplant at [facility]. Calcification at region of right UVJ, possible ureteral stone. 3. Continues to vomit with the pain. PLAN   1. Possible nephrostomy tube insertion, antegrade stent with IR. Call placed to IR to discuss case, orders placed. Awaiting callback. 2. Keep NPO at this time.       ARISTIDES Rasmussen CNP 9/8/2021 9:20 AM

## 2021-09-08 NOTE — PLAN OF CARE
Problem: Pain:  Description: Pain management should include both nonpharmacologic and pharmacologic interventions.   Goal: Pain level will decrease  Description: Pain level will decrease  9/7/2021 2301 by Martha Vasquez RN  Outcome: Ongoing  9/7/2021 1203 by Blanche Tariq RN  Outcome: Ongoing  Goal: Control of acute pain  Description: Control of acute pain  9/7/2021 2301 by Martha Vasquez RN  Outcome: Ongoing  9/7/2021 1203 by Blanche Tariq RN  Outcome: Ongoing  Goal: Control of chronic pain  Description: Control of chronic pain  9/7/2021 2301 by Martha Vasquez RN  Outcome: Ongoing  9/7/2021 1203 by Blanche Tariq RN  Outcome: Ongoing     Problem: Falls - Risk of:  Goal: Will remain free from falls  Description: Will remain free from falls  9/7/2021 2301 by Martha Vasquez RN  Outcome: Ongoing  9/7/2021 1203 by Blanche Tariq RN  Outcome: Ongoing  Goal: Absence of physical injury  Description: Absence of physical injury  9/7/2021 2301 by Martha Vasquez RN  Outcome: Ongoing  9/7/2021 1203 by Blanche Tariq RN  Outcome: Ongoing

## 2021-09-08 NOTE — PROGRESS NOTES
Spoke with IR, they do not feel comfortable placing nephrostomy drain/antegrade stent to transplanted kidney. Recommend transfer to . Spoke with RN and patient regarding this.

## 2021-09-08 NOTE — PLAN OF CARE
Problem: Pain:  Description: Pain management should include both nonpharmacologic and pharmacologic interventions.   Goal: Pain level will decrease  Description: Pain level will decrease  9/8/2021 0745 by Nilsa Underwood RN  Outcome: Ongoing  9/7/2021 2301 by Forest Stout RN  Outcome: Ongoing  Goal: Control of acute pain  Description: Control of acute pain  9/8/2021 0745 by Nilsa Underwood RN  Outcome: Ongoing  9/7/2021 2301 by Forest Stout RN  Outcome: Ongoing  Goal: Control of chronic pain  Description: Control of chronic pain  9/8/2021 0745 by Nilsa Undewrood RN  Outcome: Ongoing  9/7/2021 2301 by Forest Stout RN  Outcome: Ongoing     Problem: Falls - Risk of:  Goal: Will remain free from falls  Description: Will remain free from falls  9/8/2021 0745 by Nilsa Underwood RN  Outcome: Ongoing  9/7/2021 2301 by Forest Stout RN  Outcome: Ongoing  Goal: Absence of physical injury  Description: Absence of physical injury  9/8/2021 0745 by Nilsa Underwood RN  Outcome: Ongoing  9/7/2021 2301 by Forest Stout RN  Outcome: Ongoing

## 2021-09-09 VITALS
DIASTOLIC BLOOD PRESSURE: 77 MMHG | WEIGHT: 289.68 LBS | HEIGHT: 69 IN | SYSTOLIC BLOOD PRESSURE: 153 MMHG | HEART RATE: 80 BPM | OXYGEN SATURATION: 98 % | TEMPERATURE: 98 F | RESPIRATION RATE: 16 BRPM | BODY MASS INDEX: 42.91 KG/M2

## 2021-09-09 LAB
ALBUMIN SERPL-MCNC: 2.9 G/DL (ref 3.4–5)
ANION GAP SERPL CALCULATED.3IONS-SCNC: 10 MMOL/L (ref 3–16)
BUN BLDV-MCNC: 7 MG/DL (ref 7–20)
CALCIUM SERPL-MCNC: 7.1 MG/DL (ref 8.3–10.6)
CHLORIDE BLD-SCNC: 108 MMOL/L (ref 99–110)
CO2: 22 MMOL/L (ref 21–32)
CREAT SERPL-MCNC: 0.7 MG/DL (ref 0.6–1.1)
ESTIMATED AVERAGE GLUCOSE: 214.5 MG/DL
GFR AFRICAN AMERICAN: >60
GFR NON-AFRICAN AMERICAN: >60
GLUCOSE BLD-MCNC: 152 MG/DL (ref 70–99)
GLUCOSE BLD-MCNC: 181 MG/DL (ref 70–99)
GLUCOSE BLD-MCNC: 199 MG/DL (ref 70–99)
GLUCOSE BLD-MCNC: 206 MG/DL (ref 70–99)
GLUCOSE BLD-MCNC: 227 MG/DL (ref 70–99)
HBA1C MFR BLD: 9.1 %
PERFORMED ON: ABNORMAL
PHOSPHORUS: 2 MG/DL (ref 2.5–4.9)
POTASSIUM SERPL-SCNC: 3.5 MMOL/L (ref 3.5–5.1)
SODIUM BLD-SCNC: 140 MMOL/L (ref 136–145)

## 2021-09-09 PROCEDURE — 93005 ELECTROCARDIOGRAM TRACING: CPT | Performed by: HOSPITALIST

## 2021-09-09 PROCEDURE — 6360000002 HC RX W HCPCS: Performed by: HOSPITALIST

## 2021-09-09 PROCEDURE — 36415 COLL VENOUS BLD VENIPUNCTURE: CPT

## 2021-09-09 PROCEDURE — 6370000000 HC RX 637 (ALT 250 FOR IP): Performed by: HOSPITALIST

## 2021-09-09 PROCEDURE — 94761 N-INVAS EAR/PLS OXIMETRY MLT: CPT

## 2021-09-09 PROCEDURE — 80069 RENAL FUNCTION PANEL: CPT

## 2021-09-09 PROCEDURE — 1200000000 HC SEMI PRIVATE

## 2021-09-09 PROCEDURE — 2580000003 HC RX 258: Performed by: HOSPITALIST

## 2021-09-09 RX ADMIN — SODIUM CHLORIDE: 9 INJECTION, SOLUTION INTRAVENOUS at 15:11

## 2021-09-09 RX ADMIN — HYDROMORPHONE HYDROCHLORIDE 1 MG: 1 INJECTION, SOLUTION INTRAMUSCULAR; INTRAVENOUS; SUBCUTANEOUS at 08:08

## 2021-09-09 RX ADMIN — LISINOPRIL AND HYDROCHLOROTHIAZIDE 1 TABLET: 12.5; 1 TABLET ORAL at 18:11

## 2021-09-09 RX ADMIN — ONDANSETRON 4 MG: 2 INJECTION INTRAMUSCULAR; INTRAVENOUS at 04:07

## 2021-09-09 RX ADMIN — MYCOPHENOLATE MOFETIL 1000 MG: 250 CAPSULE ORAL at 08:08

## 2021-09-09 RX ADMIN — HYDROMORPHONE HYDROCHLORIDE 1 MG: 1 INJECTION, SOLUTION INTRAMUSCULAR; INTRAVENOUS; SUBCUTANEOUS at 12:21

## 2021-09-09 RX ADMIN — ENOXAPARIN SODIUM 40 MG: 40 INJECTION SUBCUTANEOUS at 08:10

## 2021-09-09 RX ADMIN — HYDROMORPHONE HYDROCHLORIDE 1 MG: 1 INJECTION, SOLUTION INTRAMUSCULAR; INTRAVENOUS; SUBCUTANEOUS at 16:11

## 2021-09-09 RX ADMIN — INSULIN LISPRO 1 UNITS: 100 INJECTION, SOLUTION INTRAVENOUS; SUBCUTANEOUS at 18:11

## 2021-09-09 RX ADMIN — HYDROMORPHONE HYDROCHLORIDE 1 MG: 1 INJECTION, SOLUTION INTRAMUSCULAR; INTRAVENOUS; SUBCUTANEOUS at 04:08

## 2021-09-09 RX ADMIN — CITALOPRAM HYDROBROMIDE 10 MG: 10 TABLET ORAL at 21:06

## 2021-09-09 RX ADMIN — FAMOTIDINE 20 MG: 20 TABLET, FILM COATED ORAL at 08:08

## 2021-09-09 RX ADMIN — FAMOTIDINE 20 MG: 20 TABLET, FILM COATED ORAL at 21:06

## 2021-09-09 RX ADMIN — INSULIN LISPRO 1 UNITS: 100 INJECTION, SOLUTION INTRAVENOUS; SUBCUTANEOUS at 08:16

## 2021-09-09 RX ADMIN — Medication 2000 UNITS: at 08:08

## 2021-09-09 RX ADMIN — INSULIN LISPRO 2 UNITS: 100 INJECTION, SOLUTION INTRAVENOUS; SUBCUTANEOUS at 12:21

## 2021-09-09 RX ADMIN — ONDANSETRON 4 MG: 2 INJECTION INTRAMUSCULAR; INTRAVENOUS at 16:11

## 2021-09-09 RX ADMIN — SODIUM CHLORIDE: 9 INJECTION, SOLUTION INTRAVENOUS at 01:44

## 2021-09-09 RX ADMIN — ONDANSETRON 4 MG: 2 INJECTION INTRAMUSCULAR; INTRAVENOUS at 12:21

## 2021-09-09 RX ADMIN — ONDANSETRON 4 MG: 2 INJECTION INTRAMUSCULAR; INTRAVENOUS at 08:08

## 2021-09-09 RX ADMIN — HYDROMORPHONE HYDROCHLORIDE 1 MG: 1 INJECTION, SOLUTION INTRAMUSCULAR; INTRAVENOUS; SUBCUTANEOUS at 21:01

## 2021-09-09 RX ADMIN — ONDANSETRON 4 MG: 2 INJECTION INTRAMUSCULAR; INTRAVENOUS at 21:00

## 2021-09-09 RX ADMIN — INSULIN LISPRO 1 UNITS: 100 INJECTION, SOLUTION INTRAVENOUS; SUBCUTANEOUS at 21:21

## 2021-09-09 RX ADMIN — ENTECAVIR 0.5 MG: 1 TABLET ORAL at 09:00

## 2021-09-09 RX ADMIN — MYCOPHENOLATE MOFETIL 1000 MG: 250 CAPSULE ORAL at 21:08

## 2021-09-09 ASSESSMENT — PAIN DESCRIPTION - DESCRIPTORS
DESCRIPTORS: ACHING;DISCOMFORT

## 2021-09-09 ASSESSMENT — PAIN DESCRIPTION - ONSET
ONSET: ON-GOING

## 2021-09-09 ASSESSMENT — PAIN DESCRIPTION - PAIN TYPE
TYPE: ACUTE PAIN

## 2021-09-09 ASSESSMENT — PAIN SCALES - GENERAL
PAINLEVEL_OUTOF10: 0
PAINLEVEL_OUTOF10: 7
PAINLEVEL_OUTOF10: 2
PAINLEVEL_OUTOF10: 7

## 2021-09-09 ASSESSMENT — PAIN DESCRIPTION - PROGRESSION
CLINICAL_PROGRESSION: NOT CHANGED

## 2021-09-09 ASSESSMENT — PAIN DESCRIPTION - FREQUENCY
FREQUENCY: INTERMITTENT

## 2021-09-09 ASSESSMENT — PAIN DESCRIPTION - LOCATION
LOCATION: BACK;FLANK
LOCATION: BACK;FLANK
LOCATION: BACK;FLANK;PELVIS

## 2021-09-09 ASSESSMENT — PAIN DESCRIPTION - ORIENTATION
ORIENTATION: RIGHT;LOWER
ORIENTATION: RIGHT;LOWER
ORIENTATION: RIGHT;LOWER;MID

## 2021-09-09 ASSESSMENT — PAIN - FUNCTIONAL ASSESSMENT
PAIN_FUNCTIONAL_ASSESSMENT: PREVENTS OR INTERFERES SOME ACTIVE ACTIVITIES AND ADLS

## 2021-09-09 NOTE — PROGRESS NOTES
Perfect serve to Dr. Tiffany Xavier asking for heating pad to use between pain medications.  Awaiting response

## 2021-09-09 NOTE — PROGRESS NOTES
Patient awake alert sitting up in bed complains of lower back pain 7/10. Complains of nausea . Medicated for pain and nausea as ordered. Will continue to monitor.

## 2021-09-09 NOTE — CARE COORDINATION
SW placed phone call to [phone] regarding transfer to [facility] for a higher level of care. As of 7:45am they are full. SW will meet with patient at bedside today to review transfer paperwork. Respectfully submitted,    KATHY Hess  [facility]   [phone]    Electronically signed by KATHY Ellis on 9/9/2021 at 8:47 AM

## 2021-09-09 NOTE — CARE COORDINATION
MARLYN sent FMLA paperwork to [facility] physician office. Respectfully submitted,    KATHY Lara  [facility]   [phone]    Electronically signed by KATHY Jaquez on 9/9/2021 at 4:51 PM

## 2021-09-09 NOTE — PROGRESS NOTES
Pt complains of pain 6-7/10 to right flank/back and around right pelvis. Pt vomited 100 ml undigested stomach contents. PRN zofran and Dilaudid given per MAR. Denies further needs. Call light within reach.

## 2021-09-09 NOTE — CARE COORDINATION
MARLYN placed phone call to  [facility] at [phone]. They inform that as of 4:04pm and they don't have  Bed available at [facility]. The agent stated that they used to work at [facility] and if the patient has a discharge order and possible surgery at [facility] our MD's can call and ask if she can be admitted for a procedure. MARLYN sent message to administration to see if they could assist at all. We will follow up with the team first thing in the morning. Respectfully submitted,    KATHY Fernández  [facility]   [phone]    Electronically signed by KATHY Rios on 9/9/2021 at 4:42 PM

## 2021-09-10 LAB
EKG ATRIAL RATE: 69 BPM
EKG DIAGNOSIS: NORMAL
EKG P AXIS: 29 DEGREES
EKG P-R INTERVAL: 136 MS
EKG Q-T INTERVAL: 394 MS
EKG QRS DURATION: 84 MS
EKG QTC CALCULATION (BAZETT): 422 MS
EKG R AXIS: 33 DEGREES
EKG T AXIS: 26 DEGREES
EKG VENTRICULAR RATE: 69 BPM

## 2021-09-10 PROCEDURE — 93010 ELECTROCARDIOGRAM REPORT: CPT | Performed by: INTERNAL MEDICINE

## 2021-09-10 NOTE — PROGRESS NOTES
Pt left [facility] per [facility] to [facility] room 8787 for further treatment of her kidney stone. Pt was alert, pleasant, cooperative, had no complaints, no deficits noted. IV fluids were stopped for the transport, IV site in LFA was left in place. Report had been called to [facility], all questions answered.

## 2022-03-17 ENCOUNTER — APPOINTMENT (OUTPATIENT)
Dept: CT IMAGING | Age: 42
End: 2022-03-17
Payer: COMMERCIAL

## 2022-03-17 ENCOUNTER — HOSPITAL ENCOUNTER (EMERGENCY)
Age: 42
Discharge: HOME | End: 2022-03-17
Attending: EMERGENCY MEDICINE
Payer: COMMERCIAL

## 2022-03-17 ENCOUNTER — APPOINTMENT (OUTPATIENT)
Dept: GENERAL RADIOLOGY | Age: 42
End: 2022-03-17
Payer: COMMERCIAL

## 2022-03-17 VITALS
OXYGEN SATURATION: 99 % | HEART RATE: 93 BPM | SYSTOLIC BLOOD PRESSURE: 165 MMHG | HEIGHT: 69 IN | RESPIRATION RATE: 18 BRPM | DIASTOLIC BLOOD PRESSURE: 105 MMHG | TEMPERATURE: 98.2 F | BODY MASS INDEX: 42.12 KG/M2 | WEIGHT: 284.39 LBS
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
## 2022-03-17 DIAGNOSIS — Z94.0 HISTORY OF KIDNEY TRANSPLANT: ICD-10-CM

## 2022-03-17 DIAGNOSIS — S16.1XXA STRAIN OF NECK MUSCLE, INITIAL ENCOUNTER: ICD-10-CM

## 2022-03-17 DIAGNOSIS — M25.512 ACUTE PAIN OF LEFT SHOULDER: ICD-10-CM

## 2022-03-17 PROCEDURE — 6370000000 HC RX 637 (ALT 250 FOR IP): Performed by: EMERGENCY MEDICINE

## 2022-03-17 PROCEDURE — 73030 X-RAY EXAM OF SHOULDER: CPT

## 2022-03-17 PROCEDURE — 99283 EMERGENCY DEPT VISIT LOW MDM: CPT

## 2022-03-17 PROCEDURE — 72125 CT NECK SPINE W/O DYE: CPT

## 2022-03-17 RX ORDER — METHOCARBAMOL 750 MG/1
750-1500 TABLET, FILM COATED ORAL EVERY 8 HOURS PRN
Qty: 30 TABLET | Refills: 0 | Status: SHIPPED | OUTPATIENT
Start: 2022-03-17 | End: 2022-03-27

## 2022-03-17 RX ORDER — ACETAMINOPHEN 325 MG/1
650 TABLET ORAL ONCE
Status: COMPLETED | OUTPATIENT
Start: 2022-03-17 | End: 2022-03-17

## 2022-03-17 RX ADMIN — ACETAMINOPHEN 650 MG: 325 TABLET ORAL at 19:25

## 2022-03-17 ASSESSMENT — PAIN SCALES - GENERAL: PAINLEVEL_OUTOF10: 8
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
## 2022-05-31 ENCOUNTER — APPOINTMENT (OUTPATIENT)
Dept: CT IMAGING | Age: 42
End: 2022-05-31
Payer: COMMERCIAL

## 2022-05-31 ENCOUNTER — HOSPITAL ENCOUNTER (EMERGENCY)
Age: 42
Discharge: HOME | End: 2022-05-31
Attending: EMERGENCY MEDICINE
Payer: COMMERCIAL

## 2022-05-31 VITALS
RESPIRATION RATE: 15 BRPM | HEART RATE: 96 BPM | SYSTOLIC BLOOD PRESSURE: 168 MMHG | OXYGEN SATURATION: 99 % | TEMPERATURE: 98 F | DIASTOLIC BLOOD PRESSURE: 92 MMHG

## 2022-05-31 DIAGNOSIS — R10.10 PAIN OF UPPER ABDOMEN: Primary | ICD-10-CM

## 2022-05-31 LAB
A/G RATIO: 1.1 (ref 1.1–2.2)
ALBUMIN SERPL-MCNC: 4 G/DL (ref 3.4–5)
ALP BLD-CCNC: 72 U/L (ref 40–129)
ALT SERPL-CCNC: 17 U/L (ref 10–40)
ANION GAP SERPL CALCULATED.3IONS-SCNC: 15 MMOL/L (ref 3–16)
AST SERPL-CCNC: 15 U/L (ref 15–37)
BACTERIA: ABNORMAL /HPF
BASOPHILS ABSOLUTE: 0.1 K/UL (ref 0–0.2)
BASOPHILS RELATIVE PERCENT: 0.9 %
BILIRUB SERPL-MCNC: 0.4 MG/DL (ref 0–1)
BILIRUBIN URINE: NEGATIVE
BLOOD, URINE: NEGATIVE
BUN BLDV-MCNC: 12 MG/DL (ref 7–20)
CALCIUM SERPL-MCNC: 9.6 MG/DL (ref 8.3–10.6)
CHLORIDE BLD-SCNC: 98 MMOL/L (ref 99–110)
CLARITY: CLEAR
CO2: 21 MMOL/L (ref 21–32)
COLOR: YELLOW
CREAT SERPL-MCNC: 1 MG/DL (ref 0.6–1.1)
EOSINOPHILS ABSOLUTE: 0.1 K/UL (ref 0–0.6)
EOSINOPHILS RELATIVE PERCENT: 1.6 %
EPITHELIAL CELLS, UA: 4 /HPF (ref 0–5)
GFR AFRICAN AMERICAN: >60
GFR NON-AFRICAN AMERICAN: >60
GLUCOSE BLD-MCNC: 298 MG/DL (ref 70–99)
GLUCOSE URINE: >=1000 MG/DL
HCG QUALITATIVE: NEGATIVE
HCT VFR BLD CALC: 39.2 % (ref 36–48)
HEMOGLOBIN: 13 G/DL (ref 12–16)
HYALINE CASTS: 0 /LPF (ref 0–8)
KETONES, URINE: NEGATIVE MG/DL
LEUKOCYTE ESTERASE, URINE: NEGATIVE
LIPASE: 48 U/L (ref 13–60)
LYMPHOCYTES ABSOLUTE: 1.2 K/UL (ref 1–5.1)
LYMPHOCYTES RELATIVE PERCENT: 16.6 %
MCH RBC QN AUTO: 28.5 PG (ref 26–34)
MCHC RBC AUTO-ENTMCNC: 33.2 G/DL (ref 31–36)
MCV RBC AUTO: 85.9 FL (ref 80–100)
MICROSCOPIC EXAMINATION: YES
MONOCYTES ABSOLUTE: 0.6 K/UL (ref 0–1.3)
MONOCYTES RELATIVE PERCENT: 8.1 %
NEUTROPHILS ABSOLUTE: 5.5 K/UL (ref 1.7–7.7)
NEUTROPHILS RELATIVE PERCENT: 72.8 %
NITRITE, URINE: NEGATIVE
PDW BLD-RTO: 13.9 % (ref 12.4–15.4)
PH UA: 5.5 (ref 5–8)
PLATELET # BLD: 238 K/UL (ref 135–450)
PMV BLD AUTO: 8.3 FL (ref 5–10.5)
POTASSIUM REFLEX MAGNESIUM: 4.1 MMOL/L (ref 3.5–5.1)
PROTEIN UA: 30 MG/DL
RBC # BLD: 4.57 M/UL (ref 4–5.2)
RBC UA: 0 /HPF (ref 0–4)
SODIUM BLD-SCNC: 134 MMOL/L (ref 136–145)
SPECIFIC GRAVITY UA: 1.02 (ref 1–1.03)
TOTAL PROTEIN: 7.7 G/DL (ref 6.4–8.2)
TROPONIN: <0.01 NG/ML
URINE REFLEX TO CULTURE: ABNORMAL
URINE TYPE: ABNORMAL
UROBILINOGEN, URINE: 0.2 E.U./DL
WBC # BLD: 7.5 K/UL (ref 4–11)
WBC UA: 2 /HPF (ref 0–5)

## 2022-05-31 PROCEDURE — 36415 COLL VENOUS BLD VENIPUNCTURE: CPT

## 2022-05-31 PROCEDURE — 81001 URINALYSIS AUTO W/SCOPE: CPT

## 2022-05-31 PROCEDURE — 84484 ASSAY OF TROPONIN QUANT: CPT

## 2022-05-31 PROCEDURE — 99285 EMERGENCY DEPT VISIT HI MDM: CPT

## 2022-05-31 PROCEDURE — 6360000004 HC RX CONTRAST MEDICATION: Performed by: EMERGENCY MEDICINE

## 2022-05-31 PROCEDURE — 85025 COMPLETE CBC W/AUTO DIFF WBC: CPT

## 2022-05-31 PROCEDURE — 6370000000 HC RX 637 (ALT 250 FOR IP): Performed by: EMERGENCY MEDICINE

## 2022-05-31 PROCEDURE — 84703 CHORIONIC GONADOTROPIN ASSAY: CPT

## 2022-05-31 PROCEDURE — 80053 COMPREHEN METABOLIC PANEL: CPT

## 2022-05-31 PROCEDURE — 6360000002 HC RX W HCPCS: Performed by: EMERGENCY MEDICINE

## 2022-05-31 PROCEDURE — 83690 ASSAY OF LIPASE: CPT

## 2022-05-31 PROCEDURE — 96374 THER/PROPH/DIAG INJ IV PUSH: CPT

## 2022-05-31 PROCEDURE — 74177 CT ABD & PELVIS W/CONTRAST: CPT

## 2022-05-31 RX ORDER — MORPHINE SULFATE 4 MG/ML
4 INJECTION, SOLUTION INTRAMUSCULAR; INTRAVENOUS ONCE
Status: COMPLETED | OUTPATIENT
Start: 2022-05-31 | End: 2022-05-31

## 2022-05-31 RX ORDER — OXYCODONE HYDROCHLORIDE AND ACETAMINOPHEN 5; 325 MG/1; MG/1
1 TABLET ORAL ONCE
Status: COMPLETED | OUTPATIENT
Start: 2022-05-31 | End: 2022-05-31

## 2022-05-31 RX ORDER — ONDANSETRON 4 MG/1
4 TABLET, ORALLY DISINTEGRATING ORAL ONCE
Status: COMPLETED | OUTPATIENT
Start: 2022-05-31 | End: 2022-05-31

## 2022-05-31 RX ORDER — DICYCLOMINE HYDROCHLORIDE 10 MG/1
10 CAPSULE ORAL EVERY 6 HOURS PRN
Qty: 20 CAPSULE | Refills: 0 | Status: SHIPPED | OUTPATIENT
Start: 2022-05-31

## 2022-05-31 RX ADMIN — MORPHINE SULFATE 4 MG: 4 INJECTION, SOLUTION INTRAMUSCULAR; INTRAVENOUS at 03:26

## 2022-05-31 RX ADMIN — IOPAMIDOL 75 ML: 755 INJECTION, SOLUTION INTRAVENOUS at 02:43

## 2022-05-31 RX ADMIN — ONDANSETRON 4 MG: 4 TABLET, ORALLY DISINTEGRATING ORAL at 03:52

## 2022-05-31 RX ADMIN — OXYCODONE AND ACETAMINOPHEN 1 TABLET: 5; 325 TABLET ORAL at 03:51

## 2022-05-31 ASSESSMENT — ENCOUNTER SYMPTOMS
EYE DISCHARGE: 0
CONSTIPATION: 0
VOMITING: 1
ABDOMINAL PAIN: 1
COLOR CHANGE: 0
SHORTNESS OF BREATH: 0
NAUSEA: 1
EYE ITCHING: 0
COUGH: 0

## 2022-05-31 ASSESSMENT — PAIN DESCRIPTION - LOCATION: LOCATION: ABDOMEN

## 2022-05-31 ASSESSMENT — PAIN - FUNCTIONAL ASSESSMENT: PAIN_FUNCTIONAL_ASSESSMENT: PREVENTS OR INTERFERES SOME ACTIVE ACTIVITIES AND ADLS

## 2022-05-31 ASSESSMENT — PAIN DESCRIPTION - PAIN TYPE: TYPE: ACUTE PAIN

## 2022-05-31 ASSESSMENT — PAIN DESCRIPTION - FREQUENCY: FREQUENCY: CONTINUOUS

## 2022-05-31 ASSESSMENT — PAIN DESCRIPTION - ONSET: ONSET: ON-GOING

## 2022-05-31 ASSESSMENT — PAIN SCALES - GENERAL
PAINLEVEL_OUTOF10: 8
PAINLEVEL_OUTOF10: 9

## 2022-05-31 ASSESSMENT — PAIN DESCRIPTION - DESCRIPTORS: DESCRIPTORS: ACHING;BURNING

## 2022-05-31 ASSESSMENT — PAIN DESCRIPTION - ORIENTATION: ORIENTATION: LOWER

## 2022-05-31 NOTE — ED NOTES
D/C: Order noted for d/c. Pt confirmed d/c paperwork have correct name. Discharge and education instructions reviewed with patient. Teach-back successful. Pt verbalized understanding and signed d/c papers. Pt denied questions at this time. No acute distress noted. Patient instructed to follow-up as noted - return to emergency department if symptoms worsen. Patient verbalized understanding. Discharged per EDMD with discharge instructions. Pt discharged to private vehicle. Patient stable upon departure. Thanked patient for choosing [facility]) for care.                   Meka Salas RN  05/31/22 7763

## 2022-05-31 NOTE — ED PROVIDER NOTES
[facility]      Pt Name: Ema Potter  MRN: [id number]  [place] 1980  Date of evaluation: 5/30/2022  Provider: Veronica Guerrero MD    CHIEF COMPLAINT       Chief Complaint   Patient presents with    Abdominal Pain     began earlier this morning; has a hernia; states hasnt had a bowel movement all day     Nausea       HISTORY OF PRESENT ILLNESS    Ema Potter is a 38 y.o. female who presents to the emergency department with abdominal pain. Patient endorses generalized abdominal pain. Started just prior to arrival.  She does have a ventral hernia. States she is concerned she has a bowel obstruction. Pain is 8 out of 10 achy nature. Worse with movement. Better with rest.  Started spontaneously. Worse with eating. Denies constipation or diarrhea. No nausea or vomiting. No other associated symptoms. Nursing Notes were reviewed. Including nursing noted for FM, Surgical History, Past Medical History, Social History, vitals, and allergies; agree with all. REVIEW OF SYSTEMS       Review of Systems   Constitutional: Negative for diaphoresis and unexpected weight change. HENT: Negative for congestion and dental problem. Eyes: Negative for discharge and itching. Respiratory: Negative for cough and shortness of breath. Cardiovascular: Negative for chest pain and leg swelling. Gastrointestinal: Positive for abdominal pain, nausea and vomiting. Negative for constipation. Endocrine: Negative for cold intolerance and heat intolerance. Genitourinary: Negative for vaginal bleeding, vaginal discharge and vaginal pain. Musculoskeletal: Negative for neck pain and neck stiffness. Skin: Negative for color change and pallor. Neurological: Negative for tremors and weakness. Psychiatric/Behavioral: Negative for agitation and behavioral problems.        Except as noted above the remainder of the review of systems was reviewed and negative.      PAST MEDICAL HISTORY     Past Medical History:   Diagnosis Date    Anemia     Chronic kidney disease     Diabetes mellitus ([address]     Hypertension     Obesity        SURGICAL HISTORY       Past Surgical History:   Procedure Laterality Date     SECTION  ,,2011    CHOLECYSTECTOMY      DIALYSIS FISTULA CREATION      ENDOSCOPY, COLON, DIAGNOSTIC  2018    Esophagogastroduodenoscopy with biopsy Colonoscopy with polypectomy (snare cautery)    HAND TENDON SURGERY      thumb- 2002    TUBAL LIGATION  8318    UMBILICAL HERNIA REPAIR         CURRENT MEDICATIONS       Previous Medications    ACETAMINOPHEN (TYLENOL) 325 MG TABLET    Take 650 mg by mouth every 6 hours as needed for Pain    CHOLECALCIFEROL (VITAMIN D3) 2000 UNITS TABS        CITALOPRAM (CELEXA) 10 MG TABLET    Take 10 mg by mouth nightly    DULAGLUTIDE (TRULICITY) 1.5 YZ/0.6BN SOPN    Inject 1.5 mg into the skin once a week On     ENTECAVIR (BARACLUDE) 0.5 MG TABLET    Take 0.5 mg by mouth daily    FUROSEMIDE (LASIX) 80 MG TABLET    Take 80 mg by mouth daily    LISINOPRIL-HYDROCHLOROTHIAZIDE (PRINZIDE;ZESTORETIC) 10-12.5 MG PER TABLET    Take 1 tablet by mouth every evening     MYCOPHENOLATE (CELLCEPT) 250 MG CAPSULE    Take 250 mg by mouth 2 times daily 4 capsules BID, 1000 and 2200    TACROLIMUS (PROGRAF) 1 MG CAPSULE    Take 1.5 mg by mouth 2 times daily       ALLERGIES     Hydrocodone-acetaminophen, Iv dye [iodides], and Naproxen    FAMILY HISTORY        Family History   Problem Relation Age of Onset    Diabetes Mother     Hypertension Mother     Cancer Maternal Grandmother        SOCIAL HISTORY       Social History     Socioeconomic History    Marital status:      Spouse name: None    Number of children: None    Years of education: None    Highest education level: None   Occupational History    None   Tobacco Use    Smoking status: Never Smoker    Smokeless tobacco: Never Used Substance and Sexual Activity    Alcohol use: No    Drug use: No    Sexual activity: Never   Other Topics Concern    None   Social History Narrative    None     Social Determinants of Health     Financial Resource Strain:     Difficulty of Paying Living Expenses: Not on file   Food Insecurity:     Worried About Running Out of Food in the Last Year: Not on file    Rossi of Food in the Last Year: Not on file   Transportation Needs:     Lack of Transportation (Medical): Not on file    Lack of Transportation (Non-Medical): Not on file   Physical Activity:     Days of Exercise per Week: Not on file    Minutes of Exercise per Session: Not on file   Stress:     Feeling of Stress : Not on file   Social Connections:     Frequency of Communication with Friends and Family: Not on file    Frequency of Social Gatherings with Friends and Family: Not on file    Attends Anglican Services: Not on file    Active Member of [address] [place] [address] [employer] or Organizations: Not on file    Attends Club or Organization Meetings: Not on file    Marital Status: Not on file   Intimate Partner Violence:     Fear of Current or Ex-Partner: Not on file    Emotionally Abused: Not on file    Physically Abused: Not on file    Sexually Abused: Not on file   Housing Stability:     Unable to Pay for Housing in the Last Year: Not on file    Number of [place] in the Last Year: Not on file    Unstable Housing in the Last Year: Not on file       PHYSICAL EXAM       ED Triage Vitals [05/31/22 0021]   BP Temp Temp Source Heart Rate Resp SpO2 Height Weight   (!) 174/109 98 °F (36.7 °C) Oral (!) 107 16 98 % -- --       Physical Exam  Vitals and nursing note reviewed. Constitutional:       General: She is not in acute distress. Appearance: She is well-developed. She is not ill-appearing, toxic-appearing or diaphoretic. HENT:      Head: Normocephalic and atraumatic.       Right Ear: External ear normal.      Left Ear: External ear normal. Eyes:      General:         Right eye: No discharge. Left eye: No discharge. Conjunctiva/sclera: Conjunctivae normal.      Pupils: Pupils are equal, round, and reactive to light. Cardiovascular:      Rate and Rhythm: Normal rate and regular rhythm. Heart sounds: No murmur heard. Pulmonary:      Effort: Pulmonary effort is normal. No respiratory distress. Breath sounds: Normal breath sounds. No wheezing or rales. Abdominal:      General: Bowel sounds are normal. There is no distension. Palpations: Abdomen is soft. There is no mass. Tenderness: There is no abdominal tenderness. There is no guarding or rebound. Genitourinary:     Comments: Deferred  Musculoskeletal:         General: No deformity. Normal range of motion. Cervical back: Normal range of motion and neck supple. Skin:     General: Skin is warm. Findings: No erythema or rash. Neurological:      Mental Status: She is alert and oriented to person, place, and time. She is not disoriented. Cranial Nerves: No cranial nerve deficit. Motor: No atrophy or abnormal muscle tone. Coordination: Coordination normal.   Psychiatric:         Behavior: Behavior normal.         Thought Content:  Thought content normal.         DIAGNOSTIC RESULTS     RADIOLOGY:   Non-plain film images such as CT, Ultrasoundand MRI are read by the radiologist. Plain radiographic images are visualized and preliminarily interpreted by the emergency physician with the below findings:    CT shows no acute pathology    ED BEDSIDE ULTRASOUND:   Performed by ED Physician - none    LABS:  Labs Reviewed   COMPREHENSIVE METABOLIC PANEL W/ REFLEX TO MG FOR LOW K - Abnormal; Notable for the following components:       Result Value    Sodium 134 (*)     Chloride 98 (*)     Glucose 298 (*)     All other components within normal limits   URINALYSIS WITH REFLEX TO CULTURE - Abnormal; Notable for the following components:    Glucose, Ur >=1000 (*)     Protein, UA 30 (*)     All other components within normal limits   MICROSCOPIC URINALYSIS - Abnormal; Notable for the following components:    Bacteria, UA Rare (*)     All other components within normal limits   TROPONIN   CBC WITH AUTO DIFFERENTIAL   LIPASE   HCG, SERUM, QUALITATIVE       All other labs were withinnormal range or not returned as of this dictation. EMERGENCY DEPARTMENT COURSE and DIFFERENTIAL DIAGNOSIS/MDM:     PMH, Surgical Hx, FH, Social Hx reviewed by myself (ETOH usage, Tobacco usage, Drug usage reviewed by myself, no pertinent Hx)- No Pertinent Hx     Old records were reviewed by me    72-year-old with abdominal pain. Reassuring CT. Pain well controlled. Tolerating p.o.  GI related versus musculoskeletal.  Discussed outpatient follow-up with PCP. I estimate there is LOW risk for Sepsis, MI, Stroke, Tamponade, PTX, Toxicity or other life threatening etiology thus I consider the discharge disposition reasonable. The patient is at low risk for mortality based on demographic, history and clinical factors. Given the best available information and clinical assessment, I estimate the risk of hospitalization to be greater than risk of treatment at home. I have explained to the patient that the risk could rapidly change, given precautions for return and instructions. Explained to patient that the risk for mortality is low based on demographic, history and clinical factors. I discussed with patient the results of evaluation in the ED, diagnosis, care, and prognosis. The plan is to discharge to home. Patient is in agreement with plan and questions have been answered. I also discussed with patient the reasons which may require a return visit and the importance of follow-up care. The patient is well-appearing, nontoxic, and improved at the time of discharge. Patient agrees to call to arrange follow-up care as directed.    Patient understands to return immediately for worsening/change in symptoms. CRITICAL CARE TIME   Total Critical Caretime was 0 minutes, excluding separately reportable procedures. There was a high probability of clinically significant/life threatening deterioration in the patient's condition which required my urgent intervention. PROCEDURES:  Unlessotherwise noted below, none    FINAL IMPRESSION      1.  Pain of upper abdomen          DISPOSITION/PLAN   DISPOSITION Decision To Discharge 05/31/2022 03:43:10 AM    PATIENT REFERRED TO:  [address]  [address] [facility]  [phone]            DISCHARGE MEDICATIONS:  New Prescriptions    DICYCLOMINE (BENTYL) 10 MG CAPSULE    Take 1 capsule by mouth every 6 hours as needed (cramps)          (Please note that portions ofthis note were completed with a voice recognition program.  Efforts were made to edit the dictations but occasionally words are mis-transcribed.)    Polo Pennington MD(electronically signed)  Attending Emergency Physician       Polo Pennington MD  05/31/22 7500

## 2023-06-13 PROCEDURE — 96376 TX/PRO/DX INJ SAME DRUG ADON: CPT

## 2023-06-13 PROCEDURE — 96375 TX/PRO/DX INJ NEW DRUG ADDON: CPT

## 2023-06-13 PROCEDURE — 96374 THER/PROPH/DIAG INJ IV PUSH: CPT

## 2023-06-13 PROCEDURE — 99285 EMERGENCY DEPT VISIT HI MDM: CPT

## 2023-06-13 ASSESSMENT — LIFESTYLE VARIABLES
HOW OFTEN DO YOU HAVE A DRINK CONTAINING ALCOHOL: NEVER
HOW MANY STANDARD DRINKS CONTAINING ALCOHOL DO YOU HAVE ON A TYPICAL DAY: PATIENT DOES NOT DRINK

## 2023-06-14 ENCOUNTER — HOSPITAL ENCOUNTER (INPATIENT)
Age: 43
LOS: 3 days | Discharge: ANOTHER HOSPITAL | DRG: 556 | End: 2023-06-17
Attending: EMERGENCY MEDICINE | Admitting: INTERNAL MEDICINE
Payer: COMMERCIAL

## 2023-06-14 ENCOUNTER — APPOINTMENT (OUTPATIENT)
Dept: CT IMAGING | Age: 43
DRG: 556 | End: 2023-06-14
Payer: COMMERCIAL

## 2023-06-14 DIAGNOSIS — R10.31 RIGHT LOWER QUADRANT ABDOMINAL PAIN: Primary | ICD-10-CM

## 2023-06-14 PROBLEM — R10.9 ABDOMINAL PAIN: Status: ACTIVE | Noted: 2023-06-14

## 2023-06-14 LAB
ALBUMIN SERPL-MCNC: 3.8 G/DL (ref 3.4–5)
ALBUMIN/GLOB SERPL: 1.1 {RATIO} (ref 1.1–2.2)
ALP SERPL-CCNC: 74 U/L (ref 40–129)
ALT SERPL-CCNC: 17 U/L (ref 10–40)
ANION GAP SERPL CALCULATED.3IONS-SCNC: 13 MMOL/L (ref 3–16)
AST SERPL-CCNC: 15 U/L (ref 15–37)
BACTERIA URNS QL MICRO: ABNORMAL /HPF
BASOPHILS # BLD: 0.1 K/UL (ref 0–0.2)
BASOPHILS NFR BLD: 0.8 %
BILIRUB SERPL-MCNC: 0.4 MG/DL (ref 0–1)
BILIRUB UR QL STRIP.AUTO: NEGATIVE
BUN SERPL-MCNC: 16 MG/DL (ref 7–20)
CALCIUM SERPL-MCNC: 9.5 MG/DL (ref 8.3–10.6)
CHLORIDE SERPL-SCNC: 99 MMOL/L (ref 99–110)
CLARITY UR: ABNORMAL
CO2 SERPL-SCNC: 24 MMOL/L (ref 21–32)
COLOR UR: YELLOW
CREAT SERPL-MCNC: 1.1 MG/DL (ref 0.6–1.1)
DEPRECATED RDW RBC AUTO: 13.9 % (ref 12.4–15.4)
EOSINOPHIL # BLD: 0.2 K/UL (ref 0–0.6)
EOSINOPHIL NFR BLD: 2.1 %
EPI CELLS #/AREA URNS AUTO: 12 /HPF (ref 0–5)
GFR SERPLBLD CREATININE-BSD FMLA CKD-EPI: >60 ML/MIN/{1.73_M2}
GLUCOSE BLD-MCNC: 171 MG/DL (ref 70–99)
GLUCOSE BLD-MCNC: 208 MG/DL (ref 70–99)
GLUCOSE BLD-MCNC: 266 MG/DL (ref 70–99)
GLUCOSE SERPL-MCNC: 282 MG/DL (ref 70–99)
GLUCOSE UR STRIP.AUTO-MCNC: 500 MG/DL
HCG SERPL QL: NEGATIVE
HCT VFR BLD AUTO: 35.1 % (ref 36–48)
HGB BLD-MCNC: 11.3 G/DL (ref 12–16)
HGB UR QL STRIP.AUTO: ABNORMAL
HYALINE CASTS #/AREA URNS AUTO: 0 /LPF (ref 0–8)
KETONES UR STRIP.AUTO-MCNC: NEGATIVE MG/DL
LACTATE BLDV-SCNC: 1.9 MMOL/L (ref 0.4–1.9)
LEUKOCYTE ESTERASE UR QL STRIP.AUTO: NEGATIVE
LIPASE SERPL-CCNC: 40 U/L (ref 13–60)
LYMPHOCYTES # BLD: 1.4 K/UL (ref 1–5.1)
LYMPHOCYTES NFR BLD: 16.1 %
MCH RBC QN AUTO: 27.6 PG (ref 26–34)
MCHC RBC AUTO-ENTMCNC: 32.3 G/DL (ref 31–36)
MCV RBC AUTO: 85.5 FL (ref 80–100)
MONOCYTES # BLD: 0.8 K/UL (ref 0–1.3)
MONOCYTES NFR BLD: 8.9 %
NEUTROPHILS # BLD: 6.1 K/UL (ref 1.7–7.7)
NEUTROPHILS NFR BLD: 72.1 %
NITRITE UR QL STRIP.AUTO: NEGATIVE
PERFORMED ON: ABNORMAL
PH UR STRIP.AUTO: 6 [PH] (ref 5–8)
PLATELET # BLD AUTO: 281 K/UL (ref 135–450)
PMV BLD AUTO: 8.3 FL (ref 5–10.5)
POTASSIUM SERPL-SCNC: 4.1 MMOL/L (ref 3.5–5.1)
PROT SERPL-MCNC: 7.2 G/DL (ref 6.4–8.2)
PROT UR STRIP.AUTO-MCNC: 30 MG/DL
RBC # BLD AUTO: 4.11 M/UL (ref 4–5.2)
RBC CLUMPS #/AREA URNS AUTO: 5 /HPF (ref 0–4)
SODIUM SERPL-SCNC: 136 MMOL/L (ref 136–145)
SP GR UR STRIP.AUTO: 1.01 (ref 1–1.03)
UA COMPLETE W REFLEX CULTURE PNL UR: ABNORMAL
UA DIPSTICK W REFLEX MICRO PNL UR: YES
URN SPEC COLLECT METH UR: ABNORMAL
UROBILINOGEN UR STRIP-ACNC: 0.2 E.U./DL
WBC # BLD AUTO: 8.5 K/UL (ref 4–11)
WBC #/AREA URNS AUTO: 7 /HPF (ref 0–5)

## 2023-06-14 PROCEDURE — 6360000002 HC RX W HCPCS: Performed by: INTERNAL MEDICINE

## 2023-06-14 PROCEDURE — 83605 ASSAY OF LACTIC ACID: CPT

## 2023-06-14 PROCEDURE — 1200000000 HC SEMI PRIVATE

## 2023-06-14 PROCEDURE — 81001 URINALYSIS AUTO W/SCOPE: CPT

## 2023-06-14 PROCEDURE — 83036 HEMOGLOBIN GLYCOSYLATED A1C: CPT

## 2023-06-14 PROCEDURE — 2580000003 HC RX 258: Performed by: NURSE PRACTITIONER

## 2023-06-14 PROCEDURE — 84703 CHORIONIC GONADOTROPIN ASSAY: CPT

## 2023-06-14 PROCEDURE — 80053 COMPREHEN METABOLIC PANEL: CPT

## 2023-06-14 PROCEDURE — 74176 CT ABD & PELVIS W/O CONTRAST: CPT

## 2023-06-14 PROCEDURE — 99222 1ST HOSP IP/OBS MODERATE 55: CPT | Performed by: SURGERY

## 2023-06-14 PROCEDURE — 6360000002 HC RX W HCPCS: Performed by: NURSE PRACTITIONER

## 2023-06-14 PROCEDURE — 36415 COLL VENOUS BLD VENIPUNCTURE: CPT

## 2023-06-14 PROCEDURE — 83690 ASSAY OF LIPASE: CPT

## 2023-06-14 PROCEDURE — 85025 COMPLETE CBC W/AUTO DIFF WBC: CPT

## 2023-06-14 PROCEDURE — 6360000002 HC RX W HCPCS: Performed by: HOSPITALIST

## 2023-06-14 PROCEDURE — 6370000000 HC RX 637 (ALT 250 FOR IP): Performed by: HOSPITALIST

## 2023-06-14 PROCEDURE — 6360000002 HC RX W HCPCS: Performed by: EMERGENCY MEDICINE

## 2023-06-14 RX ORDER — INSULIN GLARGINE 100 [IU]/ML
20 INJECTION, SOLUTION SUBCUTANEOUS NIGHTLY
COMMUNITY

## 2023-06-14 RX ORDER — ONDANSETRON 4 MG/1
4 TABLET, ORALLY DISINTEGRATING ORAL EVERY 8 HOURS PRN
Status: DISCONTINUED | OUTPATIENT
Start: 2023-06-14 | End: 2023-06-17 | Stop reason: HOSPADM

## 2023-06-14 RX ORDER — MYCOPHENOLATE MOFETIL 250 MG/1
1000 CAPSULE ORAL 2 TIMES DAILY
Status: DISCONTINUED | OUTPATIENT
Start: 2023-06-14 | End: 2023-06-17 | Stop reason: HOSPADM

## 2023-06-14 RX ORDER — OXYCODONE HYDROCHLORIDE AND ACETAMINOPHEN 5; 325 MG/1; MG/1
1 TABLET ORAL EVERY 4 HOURS PRN
Status: DISCONTINUED | OUTPATIENT
Start: 2023-06-14 | End: 2023-06-17

## 2023-06-14 RX ORDER — FAMOTIDINE 20 MG/1
20 TABLET, FILM COATED ORAL 2 TIMES DAILY
Status: DISCONTINUED | OUTPATIENT
Start: 2023-06-14 | End: 2023-06-17 | Stop reason: HOSPADM

## 2023-06-14 RX ORDER — INSULIN LISPRO 100 [IU]/ML
0-4 INJECTION, SOLUTION INTRAVENOUS; SUBCUTANEOUS
Status: DISCONTINUED | OUTPATIENT
Start: 2023-06-14 | End: 2023-06-17

## 2023-06-14 RX ORDER — LISINOPRIL 20 MG/1
20 TABLET ORAL DAILY
Status: DISCONTINUED | OUTPATIENT
Start: 2023-06-14 | End: 2023-06-17 | Stop reason: HOSPADM

## 2023-06-14 RX ORDER — INSULIN LISPRO 100 [IU]/ML
0-4 INJECTION, SOLUTION INTRAVENOUS; SUBCUTANEOUS NIGHTLY
Status: DISCONTINUED | OUTPATIENT
Start: 2023-06-14 | End: 2023-06-17

## 2023-06-14 RX ORDER — FAMOTIDINE 20 MG/1
20 TABLET, FILM COATED ORAL 2 TIMES DAILY
COMMUNITY

## 2023-06-14 RX ORDER — HYDROMORPHONE HYDROCHLORIDE 1 MG/ML
1 INJECTION, SOLUTION INTRAMUSCULAR; INTRAVENOUS; SUBCUTANEOUS ONCE
Status: COMPLETED | OUTPATIENT
Start: 2023-06-14 | End: 2023-06-14

## 2023-06-14 RX ORDER — MORPHINE SULFATE 4 MG/ML
4 INJECTION, SOLUTION INTRAMUSCULAR; INTRAVENOUS ONCE
Status: COMPLETED | OUTPATIENT
Start: 2023-06-14 | End: 2023-06-14

## 2023-06-14 RX ORDER — ENOXAPARIN SODIUM 100 MG/ML
40 INJECTION SUBCUTANEOUS DAILY
Status: DISCONTINUED | OUTPATIENT
Start: 2023-06-14 | End: 2023-06-17 | Stop reason: HOSPADM

## 2023-06-14 RX ORDER — ONDANSETRON 2 MG/ML
4 INJECTION INTRAMUSCULAR; INTRAVENOUS EVERY 30 MIN PRN
Status: DISCONTINUED | OUTPATIENT
Start: 2023-06-14 | End: 2023-06-14

## 2023-06-14 RX ORDER — ATORVASTATIN CALCIUM 40 MG/1
40 TABLET, FILM COATED ORAL NIGHTLY
Status: DISCONTINUED | OUTPATIENT
Start: 2023-06-14 | End: 2023-06-17 | Stop reason: HOSPADM

## 2023-06-14 RX ORDER — ASPIRIN 81 MG/1
81 TABLET ORAL DAILY
COMMUNITY

## 2023-06-14 RX ORDER — HYDROMORPHONE HYDROCHLORIDE 1 MG/ML
0.5 INJECTION, SOLUTION INTRAMUSCULAR; INTRAVENOUS; SUBCUTANEOUS ONCE
Status: COMPLETED | OUTPATIENT
Start: 2023-06-14 | End: 2023-06-14

## 2023-06-14 RX ORDER — 0.9 % SODIUM CHLORIDE 0.9 %
1000 INTRAVENOUS SOLUTION INTRAVENOUS ONCE
Status: COMPLETED | OUTPATIENT
Start: 2023-06-14 | End: 2023-06-14

## 2023-06-14 RX ORDER — ATORVASTATIN CALCIUM 40 MG/1
40 TABLET, FILM COATED ORAL NIGHTLY
COMMUNITY

## 2023-06-14 RX ORDER — HYDROCHLOROTHIAZIDE 25 MG/1
25 TABLET ORAL DAILY
Status: DISCONTINUED | OUTPATIENT
Start: 2023-06-14 | End: 2023-06-17 | Stop reason: HOSPADM

## 2023-06-14 RX ORDER — LISINOPRIL AND HYDROCHLOROTHIAZIDE 25; 20 MG/1; MG/1
1 TABLET ORAL DAILY
Status: DISCONTINUED | OUTPATIENT
Start: 2023-06-14 | End: 2023-06-14 | Stop reason: SDUPTHER

## 2023-06-14 RX ORDER — ASPIRIN 81 MG/1
81 TABLET ORAL DAILY
Status: DISCONTINUED | OUTPATIENT
Start: 2023-06-14 | End: 2023-06-17 | Stop reason: HOSPADM

## 2023-06-14 RX ORDER — INSULIN GLARGINE 100 [IU]/ML
20 INJECTION, SOLUTION SUBCUTANEOUS NIGHTLY
Status: DISCONTINUED | OUTPATIENT
Start: 2023-06-14 | End: 2023-06-17 | Stop reason: HOSPADM

## 2023-06-14 RX ORDER — ASCORBIC ACID 500 MG
500 TABLET ORAL DAILY
COMMUNITY

## 2023-06-14 RX ORDER — CITALOPRAM 20 MG/1
20 TABLET ORAL DAILY
Status: DISCONTINUED | OUTPATIENT
Start: 2023-06-14 | End: 2023-06-17 | Stop reason: HOSPADM

## 2023-06-14 RX ORDER — MORPHINE SULFATE 4 MG/ML
4 INJECTION, SOLUTION INTRAMUSCULAR; INTRAVENOUS EVERY 4 HOURS PRN
Status: DISCONTINUED | OUTPATIENT
Start: 2023-06-14 | End: 2023-06-17

## 2023-06-14 RX ORDER — POLYETHYLENE GLYCOL 3350 17 G/17G
17 POWDER, FOR SOLUTION ORAL DAILY PRN
Status: DISCONTINUED | OUTPATIENT
Start: 2023-06-14 | End: 2023-06-17 | Stop reason: HOSPADM

## 2023-06-14 RX ORDER — ONDANSETRON 2 MG/ML
4 INJECTION INTRAMUSCULAR; INTRAVENOUS EVERY 6 HOURS PRN
Status: DISCONTINUED | OUTPATIENT
Start: 2023-06-14 | End: 2023-06-17 | Stop reason: HOSPADM

## 2023-06-14 RX ORDER — DEXTROSE MONOHYDRATE 100 MG/ML
INJECTION, SOLUTION INTRAVENOUS CONTINUOUS PRN
Status: DISCONTINUED | OUTPATIENT
Start: 2023-06-14 | End: 2023-06-17 | Stop reason: HOSPADM

## 2023-06-14 RX ADMIN — ONDANSETRON 4 MG: 2 INJECTION INTRAMUSCULAR; INTRAVENOUS at 16:50

## 2023-06-14 RX ADMIN — CITALOPRAM HYDROBROMIDE 20 MG: 20 TABLET ORAL at 12:44

## 2023-06-14 RX ADMIN — MORPHINE SULFATE 4 MG: 4 INJECTION, SOLUTION INTRAMUSCULAR; INTRAVENOUS at 05:34

## 2023-06-14 RX ADMIN — MORPHINE SULFATE 4 MG: 4 INJECTION, SOLUTION INTRAMUSCULAR; INTRAVENOUS at 09:51

## 2023-06-14 RX ADMIN — ONDANSETRON 4 MG: 2 INJECTION INTRAMUSCULAR; INTRAVENOUS at 01:43

## 2023-06-14 RX ADMIN — ENOXAPARIN SODIUM 40 MG: 100 INJECTION SUBCUTANEOUS at 09:51

## 2023-06-14 RX ADMIN — HYDROMORPHONE HYDROCHLORIDE 0.5 MG: 1 INJECTION, SOLUTION INTRAMUSCULAR; INTRAVENOUS; SUBCUTANEOUS at 01:43

## 2023-06-14 RX ADMIN — OXYCODONE AND ACETAMINOPHEN 1 TABLET: 5; 325 TABLET ORAL at 12:51

## 2023-06-14 RX ADMIN — HYDROCHLOROTHIAZIDE 25 MG: 25 TABLET ORAL at 12:44

## 2023-06-14 RX ADMIN — INSULIN GLARGINE 20 UNITS: 100 INJECTION, SOLUTION SUBCUTANEOUS at 20:32

## 2023-06-14 RX ADMIN — TACROLIMUS 1.5 MG: 1 CAPSULE ORAL at 10:56

## 2023-06-14 RX ADMIN — MYCOPHENOLATE MOFETIL 1000 MG: 250 CAPSULE ORAL at 10:56

## 2023-06-14 RX ADMIN — SODIUM CHLORIDE 1000 ML: 9 INJECTION, SOLUTION INTRAVENOUS at 01:42

## 2023-06-14 RX ADMIN — MYCOPHENOLATE MOFETIL 1000 MG: 250 CAPSULE ORAL at 20:31

## 2023-06-14 RX ADMIN — FAMOTIDINE 20 MG: 20 TABLET ORAL at 20:31

## 2023-06-14 RX ADMIN — INSULIN LISPRO 1 UNITS: 100 INJECTION, SOLUTION INTRAVENOUS; SUBCUTANEOUS at 12:44

## 2023-06-14 RX ADMIN — HYDROMORPHONE HYDROCHLORIDE 1 MG: 1 INJECTION, SOLUTION INTRAMUSCULAR; INTRAVENOUS; SUBCUTANEOUS at 02:55

## 2023-06-14 RX ADMIN — ASPIRIN 81 MG: 81 TABLET, COATED ORAL at 12:44

## 2023-06-14 RX ADMIN — MORPHINE SULFATE 4 MG: 4 INJECTION, SOLUTION INTRAMUSCULAR; INTRAVENOUS at 20:34

## 2023-06-14 RX ADMIN — MORPHINE SULFATE 4 MG: 4 INJECTION, SOLUTION INTRAMUSCULAR; INTRAVENOUS at 16:51

## 2023-06-14 RX ADMIN — ATORVASTATIN CALCIUM 40 MG: 40 TABLET, FILM COATED ORAL at 20:31

## 2023-06-14 RX ADMIN — TACROLIMUS 1.5 MG: 1 CAPSULE ORAL at 20:28

## 2023-06-14 RX ADMIN — FAMOTIDINE 20 MG: 20 TABLET ORAL at 12:44

## 2023-06-14 RX ADMIN — LISINOPRIL 20 MG: 10 TABLET ORAL at 12:44

## 2023-06-14 ASSESSMENT — PAIN DESCRIPTION - ORIENTATION
ORIENTATION: LOWER

## 2023-06-14 ASSESSMENT — PAIN DESCRIPTION - FREQUENCY: FREQUENCY: CONTINUOUS

## 2023-06-14 ASSESSMENT — PAIN DESCRIPTION - LOCATION
LOCATION: ABDOMEN

## 2023-06-14 ASSESSMENT — PAIN SCALES - GENERAL
PAINLEVEL_OUTOF10: 8
PAINLEVEL_OUTOF10: 7
PAINLEVEL_OUTOF10: 7
PAINLEVEL_OUTOF10: 6
PAINLEVEL_OUTOF10: 6
PAINLEVEL_OUTOF10: 8
PAINLEVEL_OUTOF10: 5

## 2023-06-14 ASSESSMENT — PAIN DESCRIPTION - DESCRIPTORS
DESCRIPTORS: ACHING
DESCRIPTORS: ACHING
DESCRIPTORS: DULL;ACHING

## 2023-06-14 ASSESSMENT — ENCOUNTER SYMPTOMS
VOMITING: 0
DIARRHEA: 0
NAUSEA: 1
CHEST TIGHTNESS: 0
SHORTNESS OF BREATH: 0
ABDOMINAL PAIN: 1

## 2023-06-14 ASSESSMENT — PAIN DESCRIPTION - ONSET: ONSET: ON-GOING

## 2023-06-14 ASSESSMENT — PAIN - FUNCTIONAL ASSESSMENT: PAIN_FUNCTIONAL_ASSESSMENT: ACTIVITIES ARE NOT PREVENTED

## 2023-06-14 ASSESSMENT — PAIN DESCRIPTION - PAIN TYPE: TYPE: ACUTE PAIN

## 2023-06-14 NOTE — PROGRESS NOTES
Patient seen in ED, room 15. Admission initiated but only completed through the Medication Reconciliation, which was completed by the Pharmacist, Diane Petersen. The inpatient RN will need to complete the balance of the admission beginning with the [place] in addition to the followin Eyes Assessment, Immunizations, Covid Vaccines, Rights and Responsibilities, Orientation to room, Plan of Care, Education/Learning Assessment and Education, white board, height and weight, pain assessment and head to toe assessment. Patient is alert and is being admitted for abdominal pain. Plan of care updated if indicated. All questions answered.

## 2023-06-14 NOTE — ED PROVIDER NOTES
[facility]        Pt Name: Wesley Byrd  MRN: [id number]  [place] 1980  Date of evaluation: 6/13/2023  Provider: ARISTIDES Barahona CNP  PCP: Dionicio Cogan  Note Started: 3:22 AM EDT 6/14/23       I have seen and evaluated this patient with my supervising physician lindy      [address]       Chief Complaint   Patient presents with    Abdominal Pain     Pt arrives in the ED with c/o lower abd pain and lower back pain. Pt stated the pain is unbearable and is sharp stabbing. Pt stated this feels similar to the time she had a strangulated hernia        HISTORY OF PRESENT ILLNESS: 1 or more Elements     History from : Patient    Limitations to history : None    Wesley Byrd is a 41 y.o. female who presents to the emergency department with right lower quadrant abdominal pain that is sharp/stabbing. Onset of symptoms this evening. The patient does report history of hernia, reports similar pain. She is a renal right-sided transplant patient, UC. Reports nausea without vomiting or diarrhea. Denies any headache, fever, lightheadedness, dizziness, visual disturbances. No chest pain or pressure. No neck or back pain. No shortness of breath, cough, or congestion. No vomiting, diarrhea, constipation, or dysuria. No rash. Nursing Notes were all reviewed and agreed with or any disagreements were addressed in the HPI. REVIEW OF SYSTEMS :      Review of Systems   Constitutional:  Negative for activity change, chills and fever. Respiratory:  Negative for chest tightness and shortness of breath. Cardiovascular:  Negative for chest pain. Gastrointestinal:  Positive for abdominal pain and nausea. Negative for diarrhea and vomiting. Genitourinary:  Negative for dysuria. All other systems reviewed and are negative. Positives and Pertinent negatives as per HPI.      SURGICAL HISTORY     Past

## 2023-06-14 NOTE — PROGRESS NOTES
Pt to unit from ED. Report taken from [place]. Pt A&O, VSS. Denies any needs at this time. Bed locked and in lowest position. Call light within reach. Will continue to monitor.

## 2023-06-14 NOTE — ED PROVIDER NOTES
I have personally performed a face to face diagnostic evaluation on this patient. I have fully participated in the care of this patient I personally saw the patient and performed a substantive portion of the visit including all aspects of the medical decision making. I have reviewed and agree with all pertinent clinical information including history, physical exam, diagnostic tests, and the plan. HPI: Suleman Dean presented with abdominal pain in the lower abdomen in the right lower side. Pain is sharp and stabbing at times 10 out of 10. Similar to previous time when she had a hernia. Nothing else seems to make symptoms better or worse tends to come and go on its own. Chief Complaint   Patient presents with    Abdominal Pain     Pt arrives in the ED with c/o lower abd pain and lower back pain. Pt stated the pain is unbearable and is sharp stabbing. Pt stated this feels similar to the time she had a strangulated hernia      Review of Systems: See GELY note  Vital Signs: BP (!) 172/101   Pulse (!) 128   Temp 99.5 °F (37.5 °C) (Oral)   Resp 24   SpO2 99%     Alert 41 y.o. female who does not appear toxic or acutely ill  HENT: Atraumatic, oral mucosa moist  Neck: Grossly normal ROM  Chest/Lungs: respiratory effort normal   Abdomen: Right-sided abdominal pain  Musculoskeletal: Grossly normal ROM  Skin: No palor or diaphoresis    Medical Decision Making and Plan:  Pertinent Labs & Imaging studies reviewed. (See GELY chart for details)  I agree with GELY assessment and plan. 72-year-old female presents for right-sided abdominal pain CT scan does not show any acute findings however patient is persistently severely tachycardic with severe pain history of prior renal transplant renal function is unremarkable. Patient has a history of diabetes and hypertension patient is persistently tachycardic. Despite IV fluids and pain meds. No other signs of sepsis.   To be admitted for abdominal pain observation and

## 2023-06-14 NOTE — H&P
HOSPITALISTS HISTORY AND PHYSICAL    6/14/2023 11:34 AM    Patient Information:  Shelia Becker is a 41 y.o. female [id number]  PCP:  Nidia Titus (Tel: [phone] )    Chief complaint:    Chief Complaint   Patient presents with    Abdominal Pain     Pt arrives in the ED with c/o lower abd pain and lower back pain. Pt stated the pain is unbearable and is sharp stabbing. Pt stated this feels similar to the time she had a strangulated hernia         History of Present Illness:  Gila Blanco is a 41 y.o. female who presented with presents to ER with complaints of back pain. Initially patient said had low back pain 2 days ago that progressively got worse now having upper and lower back pain including abdominal pain lower abdominal pain says worse with even movement. Patient initially said abdominal pain now with soft stabbing pain. Patient with history of hernia repair when she had similar pain. Patient said pain is 10 out of 10. With associated nausea but no vomiting. Denies any fevers chills no diarrhea. Patient given IV and p.o. pain medicine in the ER with minimal improvement. Patient has history of right sided renal transplant follows UC. Patient denies any anemia antibiotics. REVIEW OF SYSTEMS:   Constitutional: Negative for fever,chills or night sweats  ENT: Negative for rhinorrhea, epistaxis, hoarseness, sore throat. Respiratory: Negative for shortness of breath,wheezing  Cardiovascular: Negative for chest pain, palpitations   Gastrointestinal: Negative for nausea, vomiting, diarrhea  Genitourinary: Negative for polyuria, dysuria   Hematologic/Lymphatic: Negative for bleeding tendency, easy bruising  Musculoskeletal: Negative for myalgias and arthralgias  Neurologic: Negative for confusion,dysarthria.   Skin: Negative for itching,rash,

## 2023-06-14 NOTE — PROGRESS NOTES
4 Eyes Skin Assessment     The patient is being assess for   Admission    I agree that 2 RN's have performed a thorough Head to Toe Skin Assessment on the patient. ALL assessment sites listed below have been assessed. Areas assessed for pressure by both nurses:   [x]   Head, Face, and Ears   [x]   Shoulders, Back, and Chest, Abdomen  [x]   Arms, Elbows, and Hands   [x]   Coccyx, Sacrum, and Ischium  [x]   Legs, Feet, and Heels        Skin Assessed Under all Medical Devices by both nurses:  N/a         All Mepilex Borders were peeled back and area peeked at by both nurses: n/a    Please list where Mepilex Borders are located:  n/a             **SHARE this note so that the co-signing nurse is able to place an eSignature**    Co-signer eSignature: Electronically signed by Balbir Toledo RN on 6/14/23 at 6:08 PM EDT    Does the Patient have Skin Breakdown related to pressure?   No     (Insert Photo here)         Navdeep Prevention initiated:  No   Wound Care Orders initiated:  No      C nurse consulted for Pressure Injury (Stage 3,4, Unstageable, DTI, NWPT, Complex wounds)and New or Established Ostomies:  No      Primary Nurse eSignature: Electronically signed by Tere Wiggins RN on 6/14/2023 at 5:58 PM

## 2023-06-14 NOTE — PROGRESS NOTES
Pharmacy Home Medication Reconciliation Note    A medication reconciliation has been completed for Shan Alfaro 1980    Pharmacy: [facility] [address] [place] [address], [facility] specialty pharmacy  Information provided by: patient, fill history    The patient's home medication list is as follows: No current facility-administered medications on file prior to encounter. Current Outpatient Medications on File Prior to Encounter   Medication Sig Dispense Refill    vitamin C (ASCORBIC ACID) 500 MG tablet Take 1 tablet by mouth daily      atorvastatin (LIPITOR) 40 MG tablet Take 1 tablet by mouth at bedtime      insulin glargine (BASAGLAR KWIKPEN) 100 UNIT/ML injection pen Inject 20 Units into the skin nightly      famotidine (PEPCID) 20 MG tablet Take 1 tablet by mouth 2 times daily      aspirin 81 MG EC tablet Take 1 tablet by mouth daily      [DISCONTINUED] dicyclomine (BENTYL) 10 MG capsule Take 1 capsule by mouth every 6 hours as needed (cramps) (Patient not taking: Reported on 6/13/2023) 20 capsule 0    Dulaglutide 3 MG/0.5ML SOPN Inject 3 mg into the skin once a week On Sunday      entecavir (BARACLUDE) 0.5 MG tablet Take 1 tablet by mouth daily      mycophenolate (CELLCEPT) 250 MG capsule Take 4 capsules by mouth 2 times daily      tacrolimus (PROGRAF) 1 MG capsule Take 1.5 mg by mouth 2 times daily      citalopram (CELEXA) 20 MG tablet Take 1 tablet by mouth daily      [DISCONTINUED] furosemide (LASIX) 80 MG tablet Take 1 tablet by mouth daily (Patient not taking: Reported on 6/14/2023)      vitamin D3 (CHOLECALCIFEROL) 25 MCG (1000 UT) TABS tablet Take 2 tablets by mouth daily  4    acetaminophen (TYLENOL) 325 MG tablet Take 2 tablets by mouth every 6 hours as needed for Pain      lisinopril-hydroCHLOROthiazide (PRINZIDE;ZESTORETIC) 20-25 MG per tablet Take 1 tablet by mouth daily         Patient is no longer taking dicyclomine, furosemide.     Of note, patient's transplant regimen consists of prograf 1.5mg BID as

## 2023-06-15 LAB
ALBUMIN SERPL-MCNC: 3.4 G/DL (ref 3.4–5)
ALBUMIN/GLOB SERPL: 1 {RATIO} (ref 1.1–2.2)
ALP SERPL-CCNC: 73 U/L (ref 40–129)
ALT SERPL-CCNC: 23 U/L (ref 10–40)
ANION GAP SERPL CALCULATED.3IONS-SCNC: 8 MMOL/L (ref 3–16)
APTT BLD: 25.8 SEC (ref 22.7–35.9)
AST SERPL-CCNC: 23 U/L (ref 15–37)
BASOPHILS # BLD: 0 K/UL (ref 0–0.2)
BASOPHILS NFR BLD: 0.7 %
BILIRUB SERPL-MCNC: 0.5 MG/DL (ref 0–1)
BUN SERPL-MCNC: 13 MG/DL (ref 7–20)
CALCIUM SERPL-MCNC: 9.4 MG/DL (ref 8.3–10.6)
CHLORIDE SERPL-SCNC: 99 MMOL/L (ref 99–110)
CO2 SERPL-SCNC: 28 MMOL/L (ref 21–32)
CREAT SERPL-MCNC: 1.1 MG/DL (ref 0.6–1.1)
DEPRECATED RDW RBC AUTO: 14.3 % (ref 12.4–15.4)
EOSINOPHIL # BLD: 0.2 K/UL (ref 0–0.6)
EOSINOPHIL NFR BLD: 2.5 %
EST. AVERAGE GLUCOSE BLD GHB EST-MCNC: 266.1 MG/DL
GFR SERPLBLD CREATININE-BSD FMLA CKD-EPI: >60 ML/MIN/{1.73_M2}
GLUCOSE BLD-MCNC: 179 MG/DL (ref 70–99)
GLUCOSE BLD-MCNC: 202 MG/DL (ref 70–99)
GLUCOSE BLD-MCNC: 231 MG/DL (ref 70–99)
GLUCOSE BLD-MCNC: 238 MG/DL (ref 70–99)
GLUCOSE SERPL-MCNC: 270 MG/DL (ref 70–99)
HBA1C MFR BLD: 10.9 %
HCT VFR BLD AUTO: 36.2 % (ref 36–48)
HGB BLD-MCNC: 11.3 G/DL (ref 12–16)
INR PPP: 0.97 (ref 0.84–1.16)
LACTATE BLDV-SCNC: 1.7 MMOL/L (ref 0.4–2)
LIPASE SERPL-CCNC: 64 U/L (ref 13–60)
LYMPHOCYTES # BLD: 1 K/UL (ref 1–5.1)
LYMPHOCYTES NFR BLD: 16.1 %
MAGNESIUM SERPL-MCNC: 1.7 MG/DL (ref 1.8–2.4)
MCH RBC QN AUTO: 27.1 PG (ref 26–34)
MCHC RBC AUTO-ENTMCNC: 31.4 G/DL (ref 31–36)
MCV RBC AUTO: 86.3 FL (ref 80–100)
MONOCYTES # BLD: 0.6 K/UL (ref 0–1.3)
MONOCYTES NFR BLD: 9.7 %
NEUTROPHILS # BLD: 4.3 K/UL (ref 1.7–7.7)
NEUTROPHILS NFR BLD: 71 %
PERFORMED ON: ABNORMAL
PHOSPHATE SERPL-MCNC: 3.7 MG/DL (ref 2.5–4.9)
PLATELET # BLD AUTO: 273 K/UL (ref 135–450)
PMV BLD AUTO: 8 FL (ref 5–10.5)
POTASSIUM SERPL-SCNC: 4.2 MMOL/L (ref 3.5–5.1)
PREALB SERPL-MCNC: 21.6 MG/DL (ref 20–40)
PROT SERPL-MCNC: 6.8 G/DL (ref 6.4–8.2)
PROTHROMBIN TIME: 12.9 SEC (ref 11.5–14.8)
RBC # BLD AUTO: 4.19 M/UL (ref 4–5.2)
SODIUM SERPL-SCNC: 135 MMOL/L (ref 136–145)
TRANSFERRIN SERPL-MCNC: 242 MG/DL (ref 200–360)
WBC # BLD AUTO: 6.1 K/UL (ref 4–11)

## 2023-06-15 PROCEDURE — 83735 ASSAY OF MAGNESIUM: CPT

## 2023-06-15 PROCEDURE — 36415 COLL VENOUS BLD VENIPUNCTURE: CPT

## 2023-06-15 PROCEDURE — 84100 ASSAY OF PHOSPHORUS: CPT

## 2023-06-15 PROCEDURE — 85730 THROMBOPLASTIN TIME PARTIAL: CPT

## 2023-06-15 PROCEDURE — 84134 ASSAY OF PREALBUMIN: CPT

## 2023-06-15 PROCEDURE — 85025 COMPLETE CBC W/AUTO DIFF WBC: CPT

## 2023-06-15 PROCEDURE — 80053 COMPREHEN METABOLIC PANEL: CPT

## 2023-06-15 PROCEDURE — 83605 ASSAY OF LACTIC ACID: CPT

## 2023-06-15 PROCEDURE — 99232 SBSQ HOSP IP/OBS MODERATE 35: CPT | Performed by: SURGERY

## 2023-06-15 PROCEDURE — 6360000002 HC RX W HCPCS: Performed by: HOSPITALIST

## 2023-06-15 PROCEDURE — 2580000003 HC RX 258: Performed by: SURGERY

## 2023-06-15 PROCEDURE — 83690 ASSAY OF LIPASE: CPT

## 2023-06-15 PROCEDURE — 85610 PROTHROMBIN TIME: CPT

## 2023-06-15 PROCEDURE — 6370000000 HC RX 637 (ALT 250 FOR IP): Performed by: HOSPITALIST

## 2023-06-15 PROCEDURE — 6360000002 HC RX W HCPCS: Performed by: INTERNAL MEDICINE

## 2023-06-15 PROCEDURE — 1200000000 HC SEMI PRIVATE

## 2023-06-15 PROCEDURE — 84466 ASSAY OF TRANSFERRIN: CPT

## 2023-06-15 RX ORDER — MAGNESIUM SULFATE 1 G/100ML
1000 INJECTION INTRAVENOUS ONCE
Status: COMPLETED | OUTPATIENT
Start: 2023-06-15 | End: 2023-06-15

## 2023-06-15 RX ORDER — SODIUM CHLORIDE 9 MG/ML
INJECTION, SOLUTION INTRAVENOUS CONTINUOUS
Status: DISCONTINUED | OUTPATIENT
Start: 2023-06-15 | End: 2023-06-17 | Stop reason: HOSPADM

## 2023-06-15 RX ADMIN — TACROLIMUS 1.5 MG: 1 CAPSULE ORAL at 10:11

## 2023-06-15 RX ADMIN — INSULIN LISPRO 1 UNITS: 100 INJECTION, SOLUTION INTRAVENOUS; SUBCUTANEOUS at 17:25

## 2023-06-15 RX ADMIN — INSULIN GLARGINE 20 UNITS: 100 INJECTION, SOLUTION SUBCUTANEOUS at 20:40

## 2023-06-15 RX ADMIN — INSULIN LISPRO 1 UNITS: 100 INJECTION, SOLUTION INTRAVENOUS; SUBCUTANEOUS at 12:33

## 2023-06-15 RX ADMIN — OXYCODONE AND ACETAMINOPHEN 1 TABLET: 5; 325 TABLET ORAL at 09:48

## 2023-06-15 RX ADMIN — MYCOPHENOLATE MOFETIL 1000 MG: 250 CAPSULE ORAL at 10:02

## 2023-06-15 RX ADMIN — HYDROCHLOROTHIAZIDE 25 MG: 25 TABLET ORAL at 10:01

## 2023-06-15 RX ADMIN — MYCOPHENOLATE MOFETIL 1000 MG: 250 CAPSULE ORAL at 20:40

## 2023-06-15 RX ADMIN — FAMOTIDINE 20 MG: 20 TABLET ORAL at 10:02

## 2023-06-15 RX ADMIN — ENOXAPARIN SODIUM 40 MG: 100 INJECTION SUBCUTANEOUS at 10:02

## 2023-06-15 RX ADMIN — ASPIRIN 81 MG: 81 TABLET, COATED ORAL at 10:01

## 2023-06-15 RX ADMIN — CITALOPRAM HYDROBROMIDE 20 MG: 20 TABLET ORAL at 20:40

## 2023-06-15 RX ADMIN — MORPHINE SULFATE 4 MG: 4 INJECTION, SOLUTION INTRAMUSCULAR; INTRAVENOUS at 12:33

## 2023-06-15 RX ADMIN — TACROLIMUS 1.5 MG: 1 CAPSULE ORAL at 20:40

## 2023-06-15 RX ADMIN — ATORVASTATIN CALCIUM 40 MG: 40 TABLET, FILM COATED ORAL at 20:40

## 2023-06-15 RX ADMIN — LISINOPRIL 20 MG: 10 TABLET ORAL at 10:02

## 2023-06-15 RX ADMIN — INSULIN LISPRO 1 UNITS: 100 INJECTION, SOLUTION INTRAVENOUS; SUBCUTANEOUS at 10:10

## 2023-06-15 RX ADMIN — OXYCODONE AND ACETAMINOPHEN 1 TABLET: 5; 325 TABLET ORAL at 17:25

## 2023-06-15 RX ADMIN — MAGNESIUM SULFATE HEPTAHYDRATE 1000 MG: 1 INJECTION, SOLUTION INTRAVENOUS at 10:01

## 2023-06-15 RX ADMIN — FAMOTIDINE 20 MG: 20 TABLET ORAL at 20:40

## 2023-06-15 RX ADMIN — SODIUM CHLORIDE: 9 INJECTION, SOLUTION INTRAVENOUS at 09:59

## 2023-06-15 RX ADMIN — MORPHINE SULFATE 4 MG: 4 INJECTION, SOLUTION INTRAMUSCULAR; INTRAVENOUS at 20:41

## 2023-06-15 RX ADMIN — OXYCODONE AND ACETAMINOPHEN 1 TABLET: 5; 325 TABLET ORAL at 02:29

## 2023-06-15 ASSESSMENT — PAIN DESCRIPTION - LOCATION
LOCATION: ABDOMEN

## 2023-06-15 ASSESSMENT — PAIN SCALES - GENERAL
PAINLEVEL_OUTOF10: 7
PAINLEVEL_OUTOF10: 6
PAINLEVEL_OUTOF10: 8
PAINLEVEL_OUTOF10: 7
PAINLEVEL_OUTOF10: 7

## 2023-06-15 ASSESSMENT — PAIN DESCRIPTION - ORIENTATION: ORIENTATION: RIGHT;LOWER;UPPER

## 2023-06-15 NOTE — PROGRESS NOTES
[facility]  Progress Note    Pt Name: Eugene Yanez  MRN: [id number]  [place]: 1980  Date of evaluation: 6/15/2023    Chief Complaint: abdominal pain      Subjective:    No acute events overnight  Patient still complains of right lower quadrant pain, unchanged  Still complains of nausea but was tolerating diet last night, reduced to clear liquids and still hungry  No flatus or stool since admission    Vital Signs:  Patient Vitals for the past 24 hrs:   BP Temp Temp src Pulse Resp SpO2 Height Weight   06/15/23 0948 122/81 98.1 °F (36.7 °C) -- 92 16 99 % -- --   06/15/23 0259 -- -- -- -- 16 -- -- --   06/15/23 0215 (!) 144/95 -- -- 95 18 96 % -- --   23 2104 -- -- -- -- 18 -- -- --   23 (!) 151/97 98.2 °F (36.8 °C) Oral (!) 105 18 99 % -- --   23 1823 -- -- -- -- -- -- 5' 9\" (1.753 m) 284 lb (128.8 kg)   23 1750 (!) 142/ -- -- 92 -- -- -- --   23 1749 (!) 142 -- -- 92 -- -- -- --   23 1645 (!) 142 98 °F (36.7 °C) Oral 92 16 98 % -- --   23 1600 -- -- -- 89 12 97 % -- --   23 1500 -- -- -- (!) 102 17 100 % -- --   23 1400 (!) 162/89 -- -- 91 13 97 % -- --   23 1321 -- -- -- -- 16 -- -- --   23 1300 (!) 143/93 -- -- 95 15 98 % -- --   23 1200 (!) 147/105 -- -- 95 14 98 % -- --   23 1100 -- -- -- (!) 114 25 100 % -- --   23 1021 -- -- -- -- 14 -- -- --      TEMPERATURE HISTORY 24H: Temp (24hrs), Av.1 °F (36.7 °C), Min:98 °F (36.7 °C), Max:98.2 °F (36.8 °C)    BLOOD PRESSURE HISTORY: Systolic (85WYQ), JQU:942 , Min:122 , DLY:625    Diastolic (05ETX), OXC:81, Min:81, Max:106      Intake/Output:    No intake/output data recorded. No intake/output data recorded.   Drain/tube Output:           Physical Exam:  General: awake, awake, no acute distress, oriented to person, place, time  Cardiac: regular rate and rhythm   Pulmonary: clear to auscultation bilaterally   Abdomen:

## 2023-06-15 NOTE — CARE COORDINATION
Discharge Planning:     (CM) reviewed the patient's chart to assess needs. Patient's Readmission Risk Score is 6%. Patient's medical insurance is [facility]. Patient's PCP is Dr. Dru Bergeron. No needs anticipated, at this time. CM team to follow. Staff to inform CM if additional discharge needs arise.         Barbara BRAXTON, KATHY, [facility] -   [phone]    Electronically signed by IRAIS Hopper on 6/15/2023 at 12:50 PM

## 2023-06-15 NOTE — PROGRESS NOTES
Shift assessment completed. Pt A&O, VSS. Pt requesting a transfer to , Dr Lisbet Plata notified. Dr Lisbet Plata notified re: Lazarus Libra of 1. 7. Denies any needs at this time. Bed locked and in lowest position. Call light within reach. Will continue to monitor.

## 2023-06-15 NOTE — PROGRESS NOTES
Evening assessment and medications complete, pt cooperated and tolerated well. Medications given per MAR. PRN pain medication given for abdomen pain 7, will reassess. VS stable. A&O X4. BS checked- 266. Only scheduled Lantus given. Patient clean and dry, ambulates independently. Bed side table, call light and belongings within reach. Bed locked and in lowest position, All questions and concerns addressed, no further needs at this time.

## 2023-06-15 NOTE — DISCHARGE SUMMARY
Hospital Medicine Discharge Summary      Patient Identification:   Mary Ann Umaña   : 1980  MRN: [id number]   Account: [de-identified]      Patient's PCP: Janelle Peralta    Admit Date: 2023     Discharge Date:   6/15/2023    Admitting Physician: Ashly Mariscal MD     Discharge Physician: Shania Duncan MD     Consults:     IP CONSULT TO GENERAL SURGERY  IP CONSULT TO NEPHROLOGY    Disposition: Transfer to [facility]    Condition at Discharge: Stable    Code Status:  Full Code       Discharge Diagnoses:    Right lower quadrant/lumbar pain, likely myofascial in nature (history of repaired ventral hernia in the same location in )  Midline ventral hernia without obstruction  Status post renal transplant   Chronic immunosuppression secondary to antirejection medications  Diabetes mellitus type 2 with hyperglycemia  Essential hypertension    Hospital Course:   Mary Ann Umaña is a 41 y.o. female hospitalized   2023   right lower quadrant abdominal pain radiated to the lumbar area CT scan of the abdomen showed abdominal diathesis, no other hernia located, no clear intra-abdominal pathology. Creatinine within normal limit, UA showed glucosuria without sign of urinary tract infection. Patient has no constitutional symptoms. Patient treated supportively with pain medication, she was evaluated by general surgery service. Myofascial pain was suspected. Transplanted renal ultrasound along with nephrology consult was ordered to further investigate the etiology. Patient expressed her concern about missing recurrence of hernia. I discussed the plan of care with the surgeon Dr. Jhoana Fishman and no concerning on the right lower quadrant area. Patient requested to be transferred to [facility] to continue her care there. Transfer center was contacted. Discussed with  who accepted the patient. Patient seen and examined in the day of discharge.   Vital

## 2023-06-16 LAB
ALBUMIN SERPL-MCNC: 3.3 G/DL (ref 3.4–5)
ANION GAP SERPL CALCULATED.3IONS-SCNC: 7 MMOL/L (ref 3–16)
BUN SERPL-MCNC: 9 MG/DL (ref 7–20)
CALCIUM SERPL-MCNC: 8.7 MG/DL (ref 8.3–10.6)
CHLORIDE SERPL-SCNC: 104 MMOL/L (ref 99–110)
CO2 SERPL-SCNC: 27 MMOL/L (ref 21–32)
CREAT SERPL-MCNC: 1 MG/DL (ref 0.6–1.1)
GFR SERPLBLD CREATININE-BSD FMLA CKD-EPI: >60 ML/MIN/{1.73_M2}
GLUCOSE BLD-MCNC: 184 MG/DL (ref 70–99)
GLUCOSE BLD-MCNC: 204 MG/DL (ref 70–99)
GLUCOSE BLD-MCNC: 207 MG/DL (ref 70–99)
GLUCOSE BLD-MCNC: 225 MG/DL (ref 70–99)
GLUCOSE SERPL-MCNC: 205 MG/DL (ref 70–99)
MAGNESIUM SERPL-MCNC: 1.6 MG/DL (ref 1.8–2.4)
MAGNESIUM SERPL-MCNC: 2.1 MG/DL (ref 1.8–2.4)
PERFORMED ON: ABNORMAL
PHOSPHATE SERPL-MCNC: 3.2 MG/DL (ref 2.5–4.9)
POTASSIUM SERPL-SCNC: 4.1 MMOL/L (ref 3.5–5.1)
SODIUM SERPL-SCNC: 138 MMOL/L (ref 136–145)
TACROLIMUS BLOOD: 8.4 NG/ML (ref 5–20)

## 2023-06-16 PROCEDURE — 83735 ASSAY OF MAGNESIUM: CPT

## 2023-06-16 PROCEDURE — 6360000002 HC RX W HCPCS: Performed by: INTERNAL MEDICINE

## 2023-06-16 PROCEDURE — 36415 COLL VENOUS BLD VENIPUNCTURE: CPT

## 2023-06-16 PROCEDURE — 6370000000 HC RX 637 (ALT 250 FOR IP): Performed by: HOSPITALIST

## 2023-06-16 PROCEDURE — 1200000000 HC SEMI PRIVATE

## 2023-06-16 PROCEDURE — 6360000002 HC RX W HCPCS: Performed by: STUDENT IN AN ORGANIZED HEALTH CARE EDUCATION/TRAINING PROGRAM

## 2023-06-16 PROCEDURE — 80197 ASSAY OF TACROLIMUS: CPT

## 2023-06-16 PROCEDURE — 2580000003 HC RX 258: Performed by: SURGERY

## 2023-06-16 PROCEDURE — 6360000002 HC RX W HCPCS: Performed by: HOSPITALIST

## 2023-06-16 PROCEDURE — 80069 RENAL FUNCTION PANEL: CPT

## 2023-06-16 PROCEDURE — 99232 SBSQ HOSP IP/OBS MODERATE 35: CPT | Performed by: SURGERY

## 2023-06-16 RX ORDER — MAGNESIUM SULFATE IN WATER 40 MG/ML
2000 INJECTION, SOLUTION INTRAVENOUS ONCE
Status: COMPLETED | OUTPATIENT
Start: 2023-06-16 | End: 2023-06-16

## 2023-06-16 RX ADMIN — OXYCODONE AND ACETAMINOPHEN 1 TABLET: 5; 325 TABLET ORAL at 03:35

## 2023-06-16 RX ADMIN — MORPHINE SULFATE 4 MG: 4 INJECTION, SOLUTION INTRAMUSCULAR; INTRAVENOUS at 20:48

## 2023-06-16 RX ADMIN — INSULIN GLARGINE 20 UNITS: 100 INJECTION, SOLUTION SUBCUTANEOUS at 20:47

## 2023-06-16 RX ADMIN — SODIUM CHLORIDE: 9 INJECTION, SOLUTION INTRAVENOUS at 10:34

## 2023-06-16 RX ADMIN — FAMOTIDINE 20 MG: 20 TABLET ORAL at 20:48

## 2023-06-16 RX ADMIN — TACROLIMUS 1.5 MG: 1 CAPSULE ORAL at 08:48

## 2023-06-16 RX ADMIN — MYCOPHENOLATE MOFETIL 1000 MG: 250 CAPSULE ORAL at 20:47

## 2023-06-16 RX ADMIN — INSULIN LISPRO 1 UNITS: 100 INJECTION, SOLUTION INTRAVENOUS; SUBCUTANEOUS at 12:14

## 2023-06-16 RX ADMIN — INSULIN LISPRO 1 UNITS: 100 INJECTION, SOLUTION INTRAVENOUS; SUBCUTANEOUS at 17:21

## 2023-06-16 RX ADMIN — ENOXAPARIN SODIUM 40 MG: 100 INJECTION SUBCUTANEOUS at 08:48

## 2023-06-16 RX ADMIN — HYDROCHLOROTHIAZIDE 25 MG: 25 TABLET ORAL at 08:50

## 2023-06-16 RX ADMIN — MORPHINE SULFATE 4 MG: 4 INJECTION, SOLUTION INTRAMUSCULAR; INTRAVENOUS at 02:00

## 2023-06-16 RX ADMIN — ASPIRIN 81 MG: 81 TABLET, COATED ORAL at 08:48

## 2023-06-16 RX ADMIN — MORPHINE SULFATE 4 MG: 4 INJECTION, SOLUTION INTRAMUSCULAR; INTRAVENOUS at 14:25

## 2023-06-16 RX ADMIN — MAGNESIUM SULFATE HEPTAHYDRATE 2000 MG: 40 INJECTION, SOLUTION INTRAVENOUS at 12:22

## 2023-06-16 RX ADMIN — CITALOPRAM HYDROBROMIDE 20 MG: 20 TABLET ORAL at 08:49

## 2023-06-16 RX ADMIN — FAMOTIDINE 20 MG: 20 TABLET ORAL at 08:49

## 2023-06-16 RX ADMIN — MYCOPHENOLATE MOFETIL 1000 MG: 250 CAPSULE ORAL at 08:48

## 2023-06-16 RX ADMIN — LISINOPRIL 20 MG: 10 TABLET ORAL at 08:49

## 2023-06-16 RX ADMIN — ATORVASTATIN CALCIUM 40 MG: 40 TABLET, FILM COATED ORAL at 20:48

## 2023-06-16 RX ADMIN — SODIUM CHLORIDE: 9 INJECTION, SOLUTION INTRAVENOUS at 02:05

## 2023-06-16 RX ADMIN — OXYCODONE AND ACETAMINOPHEN 1 TABLET: 5; 325 TABLET ORAL at 08:53

## 2023-06-16 RX ADMIN — TACROLIMUS 1.5 MG: 1 CAPSULE ORAL at 21:02

## 2023-06-16 ASSESSMENT — PAIN DESCRIPTION - ORIENTATION
ORIENTATION: RIGHT

## 2023-06-16 ASSESSMENT — PAIN DESCRIPTION - FREQUENCY: FREQUENCY: CONTINUOUS

## 2023-06-16 ASSESSMENT — PAIN DESCRIPTION - DESCRIPTORS
DESCRIPTORS: ACHING
DESCRIPTORS: ACHING

## 2023-06-16 ASSESSMENT — PAIN SCALES - GENERAL
PAINLEVEL_OUTOF10: 8
PAINLEVEL_OUTOF10: 6
PAINLEVEL_OUTOF10: 0
PAINLEVEL_OUTOF10: 8
PAINLEVEL_OUTOF10: 8

## 2023-06-16 ASSESSMENT — PAIN DESCRIPTION - LOCATION
LOCATION: ABDOMEN

## 2023-06-16 ASSESSMENT — PAIN DESCRIPTION - ONSET: ONSET: ON-GOING

## 2023-06-16 ASSESSMENT — PAIN DESCRIPTION - PAIN TYPE: TYPE: ACUTE PAIN

## 2023-06-16 ASSESSMENT — PAIN - FUNCTIONAL ASSESSMENT: PAIN_FUNCTIONAL_ASSESSMENT: ACTIVITIES ARE NOT PREVENTED

## 2023-06-16 NOTE — CONSULTS
MD Conner Wayne MD Janina Dock, MD                Office: [phone]                      Fax: [phone]               [email or web address]                     Nephrology consult received. Full consult report will follow. Thank you for allowing us to participate in this patient's care. Please do not hesitate to contact us anytime. We will follow along with you. Sola Monique MD  [facility] [address] [place] [address]   [phone] or [employer].
[facility] and Laparoscopic Surgery     Consult                                                     Patient Name: Suleman Dean  MRN: [id number]  [place]: 1980  Admission date: 2023 12:50 AM   Date of evaluation: 2023  Primary Care Physician: Ayde Wiley  Reason for consult: Abdominal pain  History of Present Illness:    Ms. Carmela Florence is a 41 y.o. female who presents with acute onset sharp right lower quadrant pain that began 2 days prior. Constant progressively worse started as dull and then progressed to sharp with nothing making it better or worse. Required emergency department evaluation last evening and is being admitted. The pain does not radiate. Similar pain in  where she was sent to [facility] for hernia repair. Additional symptoms include subjective fevers chills and sweats, nausea but no vomiting, loose stools. Denies chest pain dyspnea change in appetite and is hungry jaundice dysuria or other complaints. Surgical history is complicated with a renal transplant on the right side as well as a right inguinal hernia repair at , a prior midline  which developed a hernia which was repaired with mesh at [facility] with Dr. Bo Araujo in . This recurred it was evaluated for repair but had recommended plastic surgery involvement which would not been covered by her insurance and therefore declined hernia repair and reconstruction.   Additional medical conditions include morbid obesity with BMI 42.61 this admission and diabetes    Past Medical History:        Diagnosis Date    Anemia     Chronic kidney disease     Diabetes mellitus ([address]     Hypertension     Obesity        Past Surgical History:        Procedure Laterality Date     SECTION  ,,2011    CHOLECYSTECTOMY      DIALYSIS FISTULA CREATION      ENDOSCOPY, COLON, DIAGNOSTIC  2018    Esophagogastroduodenoscopy with biopsy Colonoscopy with polypectomy
135* 138   K 4.1 4.2 4.1   CL 99 99 104   CO2 24 28 27   BUN 16 13 9   CREATININE 1.1 1.1 1.0       Recent Labs     06/14/23  0134 06/15/23  0515 06/16/23  0534   CALCIUM 9.5 9.4 8.7   MG  --  1.70* 1.60*   PHOS  --  3.7 3.2       No results for input(s): PH, PCO2, PO2 in the last 72 hours.     Invalid input(s): Glorious Kasia    ABG:  No results found for: PH, PCO2, PO2, HCO3, BE, THGB, TCO2, O2SAT  VBG:  No results found for: PHVEN, FDD9DTL, BEVEN, J6IBUIIL    LDH:  No results found for: LDH  Uric Acid:  No results found for: LABURIC, URICACID    PT/INR:    Lab Results   Component Value Date/Time    PROTIME 12.9 06/15/2023 05:15 AM    INR 0.97 06/15/2023 05:15 AM     Warfarin PT/INR:  No components found for: PTPATWAR, PTINRWAR  PTT:    Lab Results   Component Value Date/Time    APTT 25.8 06/15/2023 05:15 AM   [APTT}  Last 3 Troponin:    Lab Results   Component Value Date/Time    TROPONINI <0.01 05/31/2022 12:42 AM    TROPONINI <0.01 05/15/2021 10:50 AM       U/A:    Lab Results   Component Value Date/Time    COLORU Yellow 06/14/2023 03:51 AM    PROTEINU 30 06/14/2023 03:51 AM    PHUR 6.0 06/14/2023 03:51 AM    WBCUA 7 06/14/2023 03:51 AM    RBCUA 5 06/14/2023 03:51 AM    BACTERIA 1+ 06/14/2023 03:51 AM    CLARITYU CLOUDY 06/14/2023 03:51 AM    SPECGRAV 1.015 06/14/2023 03:51 AM    LEUKOCYTESUR Negative 06/14/2023 03:51 AM    UROBILINOGEN 0.2 06/14/2023 03:51 AM    BILIRUBINUR Negative 06/14/2023 03:51 AM    BILIRUBINUR NEGATIVE 06/20/2010 04:21 PM    BLOODU LARGE 06/14/2023 03:51 AM    GLUCOSEU 500 06/14/2023 03:51 AM    GLUCOSEU 500 06/20/2010 04:21 PM     Microalbumen/Creatinine ratio:  No components found for: RUCREAT  24 Hour Urine for Protein:  No components found for: RAWUPRO, UHRS3, MEDS81TR, UTV3  24 Hour Urine for Creatinine Clearance:  No components found for: CREAT4, UHRS10, UTV10  Urine Toxicology:  No components found for: IAMMENTA, IBARBIT, IBENZO, ICOCAINE, IMARTHC, IOPIATES,
06/15/2023 05:15 AM    INR 0.97 06/15/2023 05:15 AM     Warfarin PT/INR:  No components found for: PTPATWAR, PTINRWAR  PTT:    Lab Results   Component Value Date/Time    APTT 25.8 06/15/2023 05:15 AM   [APTT}  Last 3 Troponin:    Lab Results   Component Value Date/Time    TROPONINI <0.01 05/31/2022 12:42 AM    TROPONINI <0.01 05/15/2021 10:50 AM       U/A:    Lab Results   Component Value Date/Time    COLORU Yellow 06/14/2023 03:51 AM    PROTEINU 30 06/14/2023 03:51 AM    PHUR 6.0 06/14/2023 03:51 AM    WBCUA 7 06/14/2023 03:51 AM    RBCUA 5 06/14/2023 03:51 AM    BACTERIA 1+ 06/14/2023 03:51 AM    CLARITYU CLOUDY 06/14/2023 03:51 AM    SPECGRAV 1.015 06/14/2023 03:51 AM    LEUKOCYTESUR Negative 06/14/2023 03:51 AM    UROBILINOGEN 0.2 06/14/2023 03:51 AM    BILIRUBINUR Negative 06/14/2023 03:51 AM    BILIRUBINUR NEGATIVE 06/20/2010 04:21 PM    BLOODU LARGE 06/14/2023 03:51 AM    GLUCOSEU 500 06/14/2023 03:51 AM    GLUCOSEU 500 06/20/2010 04:21 PM     Microalbumen/Creatinine ratio:  No components found for: RUCREAT  24 Hour Urine for Protein:  No components found for: RAWUPRO, UHRS3, VEBZ33AT, UTV3  24 Hour Urine for Creatinine Clearance:  No components found for: CREAT4, UHRS10, UTV10  Urine Toxicology:  No components found for: IAMMENTA, IBARBIT, IBENZO, ICOCAINE, IMARTHC, IOPIATES, IPHENCYC    HgBA1c:    Lab Results   Component Value Date/Time    LABA1C 10.9 06/14/2023 06:31 AM     RPR:  No results found for: RPR  HIV:  No results found for: HIV  ELLEN:  No results found for: ANATITER, ELLEN  RF:  No results found for: RF  DSDNA:  No components found for: DNA  AMYLASE:  No results found for: AMYLASE  LIPASE:    Lab Results   Component Value Date/Time    LIPASE 64.0 06/15/2023 05:15 AM     Fibrinogen Level:  No components found for: FIB       BELOW MENTIONED RADIOLOGY STUDY RESULTS BY ME (AS NEEDED FOR MY EVALUATION AND MANAGEMENT).      CT ABDOMEN PELVIS WO CONTRAST Additional Contrast? None    Result Date:

## 2023-06-16 NOTE — PLAN OF CARE
Problem: Discharge Planning  Goal: Discharge to home or other facility with appropriate resources  Recent Flowsheet Documentation  Taken 6/16/2023 0845 by Keven Espinoza RN  Discharge to home or other facility with appropriate resources: Identify barriers to discharge with patient and caregiver     Problem: Pain  Goal: Verbalizes/displays adequate comfort level or baseline comfort level  Recent Flowsheet Documentation  Taken 6/16/2023 0843 by Keven Espinoza RN  Verbalizes/displays adequate comfort level or baseline comfort level: Encourage patient to monitor pain and request assistance

## 2023-06-16 NOTE — PLAN OF CARE
Problem: Discharge Planning  Goal: Discharge to home or other facility with appropriate resources  6/15/2023 2243 by Yun Tomas RN  Outcome: Progressing     Problem: Pain  Goal: Verbalizes/displays adequate comfort level or baseline comfort level  6/15/2023 2243 by Yun Tomas RN  Outcome: Progressing       Problem: Chronic Conditions and Co-morbidities  Goal: Patient's chronic conditions and co-morbidity symptoms are monitored and maintained or improved  Outcome: Progressing

## 2023-06-16 NOTE — PROGRESS NOTES
[facility] and Laparoscopic Surgery  Progress Note    Pt Name: Eder Farrar  MRN: [id number]  YOB: 1980  Date of evaluation: 2023    Chief Complaint: abdominal pain      Subjective:    No acute events overnight  Patient still complains of right lower quadrant pain, unchanged  Emesis yesterday, still drinking liquids though  No flatus or stool since admission  Did not put phone down entire encounter    Vital Signs:  Patient Vitals for the past 24 hrs:   BP Temp Temp src Pulse Resp SpO2 Weight   23 0843 (!) 148/93 97.7 °F (36.5 °C) Oral 90 16 100 % --   23 0400 -- -- -- -- -- -- 286 lb (129.7 kg)   23 0334 (!) 153/102 97.6 °F (36.4 °C) Oral 89 16 99 % 286 lb (129.7 kg)   06/15/23 2005 (!) 144/90 98 °F (36.7 °C) Oral 92 16 99 % --   06/15/23 1412 -- -- -- -- -- -- 283 lb 14.4 oz (128.8 kg)   06/15/23 0948 122/81 98.1 °F (36.7 °C) -- 92 16 99 % --      TEMPERATURE HISTORY 24H: Temp (24hrs), Av.9 °F (36.6 °C), Min:97.6 °F (36.4 °C), Max:98.1 °F (36.7 °C)    BLOOD PRESSURE HISTORY: Systolic (24AUV), NUE:945 , Min:122 , IJC:379    Diastolic (52GHT), SQB:01, Min:81, Max:102      Intake/Output:    I/O last 3 completed shifts: In: 298.1 [I.V.:298.1]  Out: -   No intake/output data recorded.   Drain/tube Output:           Physical Exam:  General: awake, awake, no acute distress, oriented to person, place, time  Cardiac: regular rate and rhythm   Pulmonary: clear to auscultation bilaterally   Abdomen: soft, distended but obese, mild right lower quadrant tenderness without peritonitis    Labs:  CBC:    Recent Labs     23  0134 06/15/23  0515   WBC 8.5 6.1   HGB 11.3* 11.3*   HCT 35.1* 36.2    273     BMP:    Recent Labs     23  0134 06/15/23  0515 23  0534    135* 138   K 4.1 4.2 4.1   CL 99 99 104   CO2 24 28 27   BUN 16 13 9   CREATININE 1.1 1.1 1.0   GLUCOSE 282* 270* 205*     Hepatic:   Recent Labs     06/14/23  0134 06/15/23  0515

## 2023-06-17 VITALS
RESPIRATION RATE: 17 BRPM | SYSTOLIC BLOOD PRESSURE: 153 MMHG | HEIGHT: 69 IN | WEIGHT: 284.6 LBS | TEMPERATURE: 97.9 F | OXYGEN SATURATION: 98 % | DIASTOLIC BLOOD PRESSURE: 93 MMHG | HEART RATE: 93 BPM | BODY MASS INDEX: 42.15 KG/M2

## 2023-06-17 LAB
ALBUMIN SERPL-MCNC: 3.3 G/DL (ref 3.4–5)
ANION GAP SERPL CALCULATED.3IONS-SCNC: 9 MMOL/L (ref 3–16)
BUN SERPL-MCNC: 6 MG/DL (ref 7–20)
CALCIUM SERPL-MCNC: 8.8 MG/DL (ref 8.3–10.6)
CHLORIDE SERPL-SCNC: 104 MMOL/L (ref 99–110)
CO2 SERPL-SCNC: 24 MMOL/L (ref 21–32)
CREAT SERPL-MCNC: 0.9 MG/DL (ref 0.6–1.1)
GFR SERPLBLD CREATININE-BSD FMLA CKD-EPI: >60 ML/MIN/{1.73_M2}
GLUCOSE BLD-MCNC: 162 MG/DL (ref 70–99)
GLUCOSE BLD-MCNC: 198 MG/DL (ref 70–99)
GLUCOSE SERPL-MCNC: 177 MG/DL (ref 70–99)
MAGNESIUM SERPL-MCNC: 1.7 MG/DL (ref 1.8–2.4)
PERFORMED ON: ABNORMAL
PERFORMED ON: ABNORMAL
PHOSPHATE SERPL-MCNC: 2.6 MG/DL (ref 2.5–4.9)
POTASSIUM SERPL-SCNC: 3.9 MMOL/L (ref 3.5–5.1)
SODIUM SERPL-SCNC: 137 MMOL/L (ref 136–145)

## 2023-06-17 PROCEDURE — 36415 COLL VENOUS BLD VENIPUNCTURE: CPT

## 2023-06-17 PROCEDURE — 6360000002 HC RX W HCPCS: Performed by: INTERNAL MEDICINE

## 2023-06-17 PROCEDURE — 6360000002 HC RX W HCPCS: Performed by: HOSPITALIST

## 2023-06-17 PROCEDURE — 6370000000 HC RX 637 (ALT 250 FOR IP): Performed by: HOSPITALIST

## 2023-06-17 PROCEDURE — 80069 RENAL FUNCTION PANEL: CPT

## 2023-06-17 PROCEDURE — 83735 ASSAY OF MAGNESIUM: CPT

## 2023-06-17 PROCEDURE — 6360000002 HC RX W HCPCS: Performed by: STUDENT IN AN ORGANIZED HEALTH CARE EDUCATION/TRAINING PROGRAM

## 2023-06-17 RX ORDER — POLYETHYLENE GLYCOL 3350 17 G/17G
17 POWDER, FOR SOLUTION ORAL DAILY
Status: DISCONTINUED | OUTPATIENT
Start: 2023-06-17 | End: 2023-06-17 | Stop reason: HOSPADM

## 2023-06-17 RX ORDER — MAGNESIUM SULFATE IN WATER 40 MG/ML
2000 INJECTION, SOLUTION INTRAVENOUS ONCE
Status: COMPLETED | OUTPATIENT
Start: 2023-06-17 | End: 2023-06-17

## 2023-06-17 RX ORDER — INSULIN LISPRO 100 [IU]/ML
0-8 INJECTION, SOLUTION INTRAVENOUS; SUBCUTANEOUS
Status: DISCONTINUED | OUTPATIENT
Start: 2023-06-17 | End: 2023-06-17 | Stop reason: HOSPADM

## 2023-06-17 RX ORDER — MORPHINE SULFATE 4 MG/ML
4 INJECTION, SOLUTION INTRAMUSCULAR; INTRAVENOUS EVERY 4 HOURS PRN
Status: DISCONTINUED | OUTPATIENT
Start: 2023-06-17 | End: 2023-06-17 | Stop reason: HOSPADM

## 2023-06-17 RX ORDER — OXYCODONE HYDROCHLORIDE AND ACETAMINOPHEN 5; 325 MG/1; MG/1
1 TABLET ORAL EVERY 6 HOURS PRN
Status: DISCONTINUED | OUTPATIENT
Start: 2023-06-17 | End: 2023-06-17 | Stop reason: HOSPADM

## 2023-06-17 RX ORDER — INSULIN LISPRO 100 [IU]/ML
0-4 INJECTION, SOLUTION INTRAVENOUS; SUBCUTANEOUS NIGHTLY
Status: DISCONTINUED | OUTPATIENT
Start: 2023-06-17 | End: 2023-06-17 | Stop reason: HOSPADM

## 2023-06-17 RX ORDER — SIMETHICONE 80 MG
80 TABLET,CHEWABLE ORAL EVERY 6 HOURS PRN
Status: DISCONTINUED | OUTPATIENT
Start: 2023-06-17 | End: 2023-06-17 | Stop reason: HOSPADM

## 2023-06-17 RX ORDER — SENNA AND DOCUSATE SODIUM 50; 8.6 MG/1; MG/1
1 TABLET, FILM COATED ORAL DAILY
Status: DISCONTINUED | OUTPATIENT
Start: 2023-06-17 | End: 2023-06-17 | Stop reason: HOSPADM

## 2023-06-17 RX ADMIN — OXYCODONE AND ACETAMINOPHEN 1 TABLET: 5; 325 TABLET ORAL at 12:10

## 2023-06-17 RX ADMIN — MORPHINE SULFATE 4 MG: 4 INJECTION, SOLUTION INTRAMUSCULAR; INTRAVENOUS at 01:25

## 2023-06-17 RX ADMIN — MYCOPHENOLATE MOFETIL 1000 MG: 250 CAPSULE ORAL at 08:54

## 2023-06-17 RX ADMIN — FAMOTIDINE 20 MG: 20 TABLET ORAL at 08:54

## 2023-06-17 RX ADMIN — MORPHINE SULFATE 4 MG: 4 INJECTION, SOLUTION INTRAMUSCULAR; INTRAVENOUS at 08:55

## 2023-06-17 RX ADMIN — HYDROCHLOROTHIAZIDE 25 MG: 25 TABLET ORAL at 08:55

## 2023-06-17 RX ADMIN — TACROLIMUS 1.5 MG: 1 CAPSULE ORAL at 08:54

## 2023-06-17 RX ADMIN — MAGNESIUM SULFATE HEPTAHYDRATE 2000 MG: 40 INJECTION, SOLUTION INTRAVENOUS at 09:04

## 2023-06-17 RX ADMIN — ENOXAPARIN SODIUM 40 MG: 100 INJECTION SUBCUTANEOUS at 08:54

## 2023-06-17 RX ADMIN — OXYCODONE AND ACETAMINOPHEN 1 TABLET: 5; 325 TABLET ORAL at 03:18

## 2023-06-17 RX ADMIN — ASPIRIN 81 MG: 81 TABLET, COATED ORAL at 08:54

## 2023-06-17 RX ADMIN — CITALOPRAM HYDROBROMIDE 20 MG: 20 TABLET ORAL at 08:54

## 2023-06-17 RX ADMIN — LISINOPRIL 20 MG: 10 TABLET ORAL at 08:55

## 2023-06-17 ASSESSMENT — PAIN DESCRIPTION - ONSET: ONSET: ON-GOING

## 2023-06-17 ASSESSMENT — PAIN DESCRIPTION - LOCATION
LOCATION: ABDOMEN;HEAD
LOCATION: ABDOMEN

## 2023-06-17 ASSESSMENT — PAIN - FUNCTIONAL ASSESSMENT
PAIN_FUNCTIONAL_ASSESSMENT: ACTIVITIES ARE NOT PREVENTED
PAIN_FUNCTIONAL_ASSESSMENT: PREVENTS OR INTERFERES SOME ACTIVE ACTIVITIES AND ADLS

## 2023-06-17 ASSESSMENT — PAIN DESCRIPTION - DESCRIPTORS
DESCRIPTORS: ACHING;CRAMPING;DISCOMFORT
DESCRIPTORS: ACHING;CRAMPING

## 2023-06-17 ASSESSMENT — PAIN SCALES - GENERAL
PAINLEVEL_OUTOF10: 3
PAINLEVEL_OUTOF10: 7
PAINLEVEL_OUTOF10: 3
PAINLEVEL_OUTOF10: 7

## 2023-06-17 ASSESSMENT — PAIN DESCRIPTION - ORIENTATION
ORIENTATION: RIGHT
ORIENTATION: RIGHT

## 2023-06-17 ASSESSMENT — PAIN DESCRIPTION - PAIN TYPE: TYPE: ACUTE PAIN

## 2023-06-17 ASSESSMENT — PAIN DESCRIPTION - FREQUENCY: FREQUENCY: CONTINUOUS

## 2023-06-17 NOTE — PROGRESS NOTES
MD Mynor Suarez MD Laurel Slough, MD                  Office: [phone]                      Fax: [phone]             [facility]                   NEPHROLOGY INPATIENT PROGRESS NOTE:     PATIENT NAME: Eder Farrar  : 1980  MRN: [id number]        Patient is awaiting transfer to -for evaluation of unexplained abdominal pain. Discussed with Dr. Indu Reinoso. Renal functions are stable with a creatinine of 0.9. Electrolytes are stable. Immunosuppressive medications reviewed and appropriate.  - No changes suggested at this time. Renal stable                - CellCept 1000 mg 2 times a day   -To follow-up with GI to see if CellCept is the cause of her abdominal pain. Most likely not  - If that is a cause because can change to Myfortic    - Tacrolimus 1.5 mg 2 times a day   -- Check tacrolimus level -goal on 6 - in process    -Mag repletion and monitoring needed     - A1C 10, uncontrolled, fup w/ PCP outpt     - Okay to continue lisinopril/hydrochlorothiazide  -Monitor mild hyponatremia and hypertension.    -at higher risk for decompensation, needing closer monitoring. D/C plan from renal stand point:  - stable   -Continue to follow-up with UC transplant        IMPRESSION:       Admitted on:  2023 12:50 AM   For:  Abdominal pain [R10.9]  Right lower quadrant abdominal pain [R10.31]    ICD-10-CM    1.  Right lower quadrant abdominal pain  R10.31           H/O   Adrianna Blind        Dr. Cathy Stoll;      Date of Transplant: 2019   Type of Transplant: Donation after Circulatory Death (Other - Non Biological)   Cause of End Stage Renal Disease: Diabetes Mellitus - Type Other / Unknown     AlloGraft Function:   SCr stable 0.9-1.2  Urine studies acceptable, monitor      Associated problems:   - Volume status: euvolemic  : HTN : no need for tight control   : Na: Hyponatremia, mild, monitor    - Azotemia: none  - Electrolytes: K:

## 2023-06-17 NOTE — PROGRESS NOTES
Report given to transport team at this time, all questions asked and answered, transporters verbalized understanding, Pt and all pt belongings transferred at this time.

## 2023-06-17 NOTE — PROGRESS NOTES
Bed assignment for  received, transport set up, peripheral IV removed, no complications, all pt questions asked and answered, pt verbalized understanding, all pt belongings gathered at this time, report call to  at [phone], all questions asked and answered, Rn there verbalized understanding, awaiting transport at this time.

## 2023-06-17 NOTE — PROGRESS NOTES
Shift assessment completed, VSS, scheduled medications given per MAR, pt c./o R side pain rated 7/10,  PRN morphine given per MAR, IVPB mag replacement started per [facility] order, pt is calm and cooperative, no other complaints/concerns at this time. Pt informed no bed available at [facility] as of this morning, pt verbalized understanding. Brakes locked, bed in lowest position and call light within reach.

## 2023-06-17 NOTE — PROGRESS NOTES
V2.0  [facility] Progress Note      Name:  Joseline Lomas /Age/Sex: 1980  (41 y.o. female)   MRN & CSN:  [id number] & [id number] Encounter Date/Time: 2023 8:08 AM EDT    Location:  [facility]3319/3319-01 PCP: Gilberto Day: 3      Subjective:     Chief Complaint: Abdominal Pain (Pt arrives in the ED with c/o lower abd pain and lower back pain./Pt stated the pain is unbearable and is sharp stabbing./Pt stated this feels similar to the time she had a strangulated hernia )     Patient seen and examined at bedside. Had nausea, vomiting overnight. Still has abd pain. No bowel movements, not passing gas. Accepted at [facility] but awaiting for bed. Assessment and Plan:   Intractable abdominal pain. RLQ area. Uncontrollable pain. Unclear etiology. CT scan was noted to have diastases of the rectus abdominal musculature with herniation of small bowel but no evidence of strangulation or obstruction. General surgery following, No sign of strangulation. no intervention planned, pt remains on clear liquid diet surgery managing diet, still no bowel movement not passing gas. Continue percocet/morphine, needs to be tapered down as tolerated, pt awaits tx to  per nephrology and general surgery recs     History of renal transplant. stable kidney function good urine outout. Nephro managing. continue home immunosuppressive regimen. Type II diabetes. On home Lantus 20U QHS, and Dulaglutide. Last HA1C 10.9 2023. Continue current Lantus 20U qhs, continue LDSSI    Diet ADULT DIET;  Clear Liquid; 5 carb choices (75 gm/meal)   DVT Prophylaxis [x] Lovenox, []  Heparin, [] SCDs, [] Ambulation,  [] Eliquis, [] Xarelto  [] Coumadin   Code Status Full Code   Disposition From: home  Expected Disposition: TBD  Estimated Date of Discharge: await bed availability at [facility]   Surrogate Decision Maker/ POA      Review of Systems:    Review of Systems    See above    Objective:     Vitals:   Vitals:

## 2023-06-17 NOTE — PROGRESS NOTES
V2.0  [facility] [place]      Name:  Alee Perez /Age/Sex: 1980  (41 y.o. female)   MRN & CSN:  [id number] & [id number] Encounter Date/Time: 2023 8:11 AM EDT    Location:  [facility]3319/3319-01 PCP: Gilberto Day: 4      Subjective:     Chief Complaint: Abdominal Pain (Pt arrives in the ED with c/o lower abd pain and lower back pain./Pt stated the pain is unbearable and is sharp stabbing./Pt stated this feels similar to the time she had a strangulated hernia )     Still has abd pain RLQ, not mch improved taking morphine PRN. Nausea but no vomiting  Still no bowel movements, passed little bit of gas last night. Still waiting on bed at [facility]. Denies lightheadedness, dizziness, fever, night sweats, chills, chest pain, cough, dyspnea, palpitations,  dysuria. Assessment and Plan:   Intractable abdominal pain. RLQ area. Uncontrollable pain. Unclear etiology. CT scan was noted to have diastases of the rectus abdominal musculature with herniation of small bowel but no evidence of strangulation or obstruction. General surgery following, No sign of strangulation. no intervention planned, pt remains on clear liquid diet surgery managing diet, still no bowel movement passing little bit gas. Continue percocet/morphine, needs to be tapered down as tolerated, add bowel regimen, pt awaits tx to  per nephrology and general surgery recs     History of renal transplant. stable kidney function good urine outout. Nephro managing. continue home immunosuppressive regimen. Type II diabetes. On home Lantus 20U QHS, and Dulaglutide. Last HA1C 10.9 2023. Continue current Lantus 20U qhs, increase sliding scale to HDSSI. Diet ADULT DIET;  Clear Liquid; 5 carb choices (75 gm/meal)   DVT Prophylaxis [x] Lovenox, []  Heparin, [] SCDs, [] Ambulation,  [] Eliquis, [] Xarelto  [] Coumadin   Code Status Full Code   Disposition From: home  Expected Disposition: Tx to

## 2023-08-03 ENCOUNTER — APPOINTMENT (OUTPATIENT)
Dept: CT IMAGING | Age: 43
End: 2023-08-03
Payer: COMMERCIAL

## 2023-08-03 ENCOUNTER — HOSPITAL ENCOUNTER (EMERGENCY)
Age: 43
Discharge: HOME | End: 2023-08-03
Attending: STUDENT IN AN ORGANIZED HEALTH CARE EDUCATION/TRAINING PROGRAM
Payer: COMMERCIAL

## 2023-08-03 VITALS
HEIGHT: 69 IN | HEART RATE: 89 BPM | SYSTOLIC BLOOD PRESSURE: 154 MMHG | OXYGEN SATURATION: 100 % | WEIGHT: 289.5 LBS | DIASTOLIC BLOOD PRESSURE: 91 MMHG | BODY MASS INDEX: 42.88 KG/M2 | TEMPERATURE: 98.9 F | RESPIRATION RATE: 20 BRPM

## 2023-08-03 DIAGNOSIS — R10.30 LOWER ABDOMINAL PAIN: ICD-10-CM

## 2023-08-03 DIAGNOSIS — N89.8 VAGINAL MASS: Primary | ICD-10-CM

## 2023-08-03 DIAGNOSIS — N30.00 ACUTE CYSTITIS WITHOUT HEMATURIA: ICD-10-CM

## 2023-08-03 LAB
ALBUMIN SERPL-MCNC: 4 G/DL (ref 3.4–5)
ALBUMIN/GLOB SERPL: 1.3 {RATIO} (ref 1.1–2.2)
ALP SERPL-CCNC: 70 U/L (ref 40–129)
ALT SERPL-CCNC: 17 U/L (ref 10–40)
ANION GAP SERPL CALCULATED.3IONS-SCNC: 9 MMOL/L (ref 3–16)
AST SERPL-CCNC: 15 U/L (ref 15–37)
BACTERIA URNS QL MICRO: ABNORMAL /HPF
BASOPHILS # BLD: 0 K/UL (ref 0–0.2)
BASOPHILS NFR BLD: 0.5 %
BILIRUB SERPL-MCNC: 0.3 MG/DL (ref 0–1)
BILIRUB UR QL STRIP.AUTO: NEGATIVE
BUN SERPL-MCNC: 11 MG/DL (ref 7–20)
CALCIUM SERPL-MCNC: 9.6 MG/DL (ref 8.3–10.6)
CHLORIDE SERPL-SCNC: 102 MMOL/L (ref 99–110)
CLARITY UR: ABNORMAL
CO2 SERPL-SCNC: 25 MMOL/L (ref 21–32)
COLOR UR: YELLOW
CREAT SERPL-MCNC: 0.8 MG/DL (ref 0.6–1.1)
DEPRECATED RDW RBC AUTO: 14.8 % (ref 12.4–15.4)
EOSINOPHIL # BLD: 0.1 K/UL (ref 0–0.6)
EOSINOPHIL NFR BLD: 1.2 %
EPI CELLS #/AREA URNS HPF: ABNORMAL /HPF (ref 0–5)
GFR SERPLBLD CREATININE-BSD FMLA CKD-EPI: >60 ML/MIN/{1.73_M2}
GLUCOSE SERPL-MCNC: 228 MG/DL (ref 70–99)
GLUCOSE UR STRIP.AUTO-MCNC: >=1000 MG/DL
HCG SERPL QL: NEGATIVE
HCT VFR BLD AUTO: 35.8 % (ref 36–48)
HGB BLD-MCNC: 11.8 G/DL (ref 12–16)
HGB UR QL STRIP.AUTO: ABNORMAL
KETONES UR STRIP.AUTO-MCNC: NEGATIVE MG/DL
LEUKOCYTE ESTERASE UR QL STRIP.AUTO: ABNORMAL
LYMPHOCYTES # BLD: 1.2 K/UL (ref 1–5.1)
LYMPHOCYTES NFR BLD: 17.6 %
MCH RBC QN AUTO: 27.9 PG (ref 26–34)
MCHC RBC AUTO-ENTMCNC: 33 G/DL (ref 31–36)
MCV RBC AUTO: 84.4 FL (ref 80–100)
MONOCYTES # BLD: 0.7 K/UL (ref 0–1.3)
MONOCYTES NFR BLD: 9.7 %
NEUTROPHILS # BLD: 5 K/UL (ref 1.7–7.7)
NEUTROPHILS NFR BLD: 71 %
NITRITE UR QL STRIP.AUTO: NEGATIVE
PH UR STRIP.AUTO: 6 [PH] (ref 5–8)
PLATELET # BLD AUTO: 274 K/UL (ref 135–450)
PMV BLD AUTO: 7.5 FL (ref 5–10.5)
POTASSIUM SERPL-SCNC: 4.1 MMOL/L (ref 3.5–5.1)
PROT SERPL-MCNC: 7.2 G/DL (ref 6.4–8.2)
PROT UR STRIP.AUTO-MCNC: 100 MG/DL
RBC # BLD AUTO: 4.24 M/UL (ref 4–5.2)
RBC #/AREA URNS HPF: >100 /HPF (ref 0–4)
SODIUM SERPL-SCNC: 136 MMOL/L (ref 136–145)
SP GR UR STRIP.AUTO: 1.01 (ref 1–1.03)
UA DIPSTICK W REFLEX MICRO PNL UR: YES
URN SPEC COLLECT METH UR: ABNORMAL
UROBILINOGEN UR STRIP-ACNC: 1 E.U./DL
WBC # BLD AUTO: 7 K/UL (ref 4–11)
WBC #/AREA URNS HPF: ABNORMAL /HPF (ref 0–5)

## 2023-08-03 PROCEDURE — 74176 CT ABD & PELVIS W/O CONTRAST: CPT

## 2023-08-03 PROCEDURE — 80053 COMPREHEN METABOLIC PANEL: CPT

## 2023-08-03 PROCEDURE — 87086 URINE CULTURE/COLONY COUNT: CPT

## 2023-08-03 PROCEDURE — 81001 URINALYSIS AUTO W/SCOPE: CPT

## 2023-08-03 PROCEDURE — 88305 TISSUE EXAM BY PATHOLOGIST: CPT

## 2023-08-03 PROCEDURE — 85025 COMPLETE CBC W/AUTO DIFF WBC: CPT

## 2023-08-03 PROCEDURE — 84703 CHORIONIC GONADOTROPIN ASSAY: CPT

## 2023-08-03 PROCEDURE — 99284 EMERGENCY DEPT VISIT MOD MDM: CPT

## 2023-08-03 RX ORDER — LISINOPRIL 20 MG/1
20 TABLET ORAL DAILY
COMMUNITY

## 2023-08-03 RX ORDER — CEPHALEXIN 500 MG/1
500 CAPSULE ORAL 2 TIMES DAILY
Qty: 10 CAPSULE | Refills: 0 | Status: SHIPPED | OUTPATIENT
Start: 2023-08-03 | End: 2023-08-08

## 2023-08-03 RX ORDER — AMLODIPINE BESYLATE 10 MG/1
10 TABLET ORAL DAILY
COMMUNITY

## 2023-08-03 ASSESSMENT — PAIN - FUNCTIONAL ASSESSMENT: PAIN_FUNCTIONAL_ASSESSMENT: 0-10

## 2023-08-03 ASSESSMENT — PAIN SCALES - GENERAL: PAINLEVEL_OUTOF10: 2

## 2023-08-03 ASSESSMENT — PAIN DESCRIPTION - LOCATION: LOCATION: ABDOMEN

## 2023-08-03 ASSESSMENT — PAIN DESCRIPTION - ORIENTATION: ORIENTATION: LOWER

## 2023-08-03 ASSESSMENT — PAIN DESCRIPTION - DESCRIPTORS: DESCRIPTORS: CRAMPING

## 2023-08-03 NOTE — ED TRIAGE NOTES
46y/o female presents to the ED with lower and cramping and large vaginal blood clot. Pt states that she has been going through menopause in which her cycles are irregular. However, started period two days ago then started having heavy bleeding his morning and with severe abd cramping. Unable to eat and with nausea. Pt states while at work she felt \"something\" passing through her vagina. She thought it was a blood clot but had some tissue and very large. -n/v/f/c/d.

## 2023-08-03 NOTE — ED PROVIDER NOTES
[facility] ENCOUNTER            Pt Name: Sarah Sanderson   MRN: [id number]   [address] [facility] [address] 1980   Date of evaluation: 8/3/2023   Provider: Sury Garcia MD   PCP: Olivia Sawyer   Note Started: 4:44 PM EDT 8/3/23          CHIEF COMPLAINT     Chief Complaint   Patient presents with    Abdominal Injury        HISTORY OF PRESENT ILLNESS:   History from : Patient   Limitations to history : None     Sarah Sanderson is a 41 y.o. female who presents complaining of vaginal passage of a mass, lower abdominal cramping. Patient states that she thought she started her period a few days ago. She states that it has been extremely light compared to previous periods. She states that she felt what she thought was a clot passed vaginally earlier today and looked in her pad and found that it was not a clot but was a mass of tissue. She states that during this time, she was having severe lower abdominal cramping and nausea which have improved since then. She states that she is having some ongoing vaginal bleeding. She denies any other complaints or concerns. Nursing Notes were all reviewed and agreed with, or any disagreements were addressed in the HPI. REVIEW OF SYSTEMS :    Positives and Pertinent negatives as per HPI. MEDICAL HISTORY   has a past medical history of Anemia, Chronic kidney disease, Diabetes mellitus ([address]), Hypertension, and Obesity.     Past Surgical History:   Procedure Laterality Date     SECTION  ,,    CHOLECYSTECTOMY      DIALYSIS FISTULA CREATION      ENDOSCOPY, COLON, DIAGNOSTIC  2018    Esophagogastroduodenoscopy with biopsy Colonoscopy with polypectomy (snare cautery)    HAND TENDON SURGERY      thumb- 2002    TUBAL LIGATION  4095    UMBILICAL HERNIA REPAIR        CURRENTMEDICATIONS       Previous Medications    ACETAMINOPHEN (TYLENOL) 325 MG TABLET    Take 2 tablets by mouth every 6 hours as needed

## 2023-08-03 NOTE — ED NOTES
Vaginal tissue provided by patient sent to lab to be processed by pathology.      Hernandez Denney RN  08/03/23 5949

## 2023-08-05 LAB — BACTERIA UR CULT: NORMAL

## 2024-07-24 ENCOUNTER — APPOINTMENT (OUTPATIENT)
Dept: CT IMAGING | Age: 44
End: 2024-07-24
Payer: COMMERCIAL

## 2024-07-24 ENCOUNTER — APPOINTMENT (OUTPATIENT)
Dept: GENERAL RADIOLOGY | Age: 44
End: 2024-07-24
Payer: COMMERCIAL

## 2024-07-24 ENCOUNTER — HOSPITAL ENCOUNTER (INPATIENT)
Age: 44
LOS: 2 days | Discharge: HOME | End: 2024-07-27
Attending: EMERGENCY MEDICINE | Admitting: INTERNAL MEDICINE
Payer: COMMERCIAL

## 2024-07-24 DIAGNOSIS — G89.29 ACUTE EXACERBATION OF CHRONIC LOW BACK PAIN: ICD-10-CM

## 2024-07-24 DIAGNOSIS — R53.1 ACUTE RIGHT-SIDED WEAKNESS: Primary | ICD-10-CM

## 2024-07-24 DIAGNOSIS — R20.0 RIGHT SIDED NUMBNESS: ICD-10-CM

## 2024-07-24 DIAGNOSIS — R51.9 NONINTRACTABLE HEADACHE, UNSPECIFIED CHRONICITY PATTERN, UNSPECIFIED HEADACHE TYPE: ICD-10-CM

## 2024-07-24 DIAGNOSIS — I63.412 CEREBROVASCULAR ACCIDENT (CVA) DUE TO EMBOLISM OF LEFT MIDDLE CEREBRAL ARTERY (HCC): ICD-10-CM

## 2024-07-24 DIAGNOSIS — I63.9 CEREBROVASCULAR ACCIDENT (CVA), UNSPECIFIED MECHANISM (HCC): ICD-10-CM

## 2024-07-24 DIAGNOSIS — M54.50 ACUTE EXACERBATION OF CHRONIC LOW BACK PAIN: ICD-10-CM

## 2024-07-24 PROBLEM — Z94.0 KIDNEY TRANSPLANTED: Status: ACTIVE | Noted: 2024-07-24

## 2024-07-24 PROBLEM — Z79.4 TYPE 2 DIABETES MELLITUS WITH RETINOPATHY, WITH LONG-TERM CURRENT USE OF INSULIN (HCC): Status: ACTIVE | Noted: 2024-07-24

## 2024-07-24 PROBLEM — R29.90 STROKE-LIKE SYMPTOMS: Status: ACTIVE | Noted: 2024-07-24

## 2024-07-24 PROBLEM — E11.319 TYPE 2 DIABETES MELLITUS WITH RETINOPATHY, WITH LONG-TERM CURRENT USE OF INSULIN (HCC): Status: ACTIVE | Noted: 2024-07-24

## 2024-07-24 PROBLEM — E66.01 MORBID OBESITY DUE TO EXCESS CALORIES (HCC): Status: ACTIVE | Noted: 2024-07-24

## 2024-07-24 PROBLEM — I10 HTN (HYPERTENSION): Status: ACTIVE | Noted: 2024-07-24

## 2024-07-24 LAB
ALBUMIN SERPL-MCNC: 4.2 G/DL (ref 3.4–5)
ALBUMIN/GLOB SERPL: 1.1 {RATIO} (ref 1.1–2.2)
ALP SERPL-CCNC: 81 U/L (ref 40–129)
ALT SERPL-CCNC: 18 U/L (ref 10–40)
ANION GAP SERPL CALCULATED.3IONS-SCNC: 12 MMOL/L (ref 3–16)
AST SERPL-CCNC: 13 U/L (ref 15–37)
BASOPHILS # BLD: 0.1 K/UL (ref 0–0.2)
BASOPHILS NFR BLD: 0.9 %
BILIRUB SERPL-MCNC: 0.3 MG/DL (ref 0–1)
BUN SERPL-MCNC: 13 MG/DL (ref 7–20)
CALCIUM SERPL-MCNC: 9.9 MG/DL (ref 8.3–10.6)
CHLORIDE SERPL-SCNC: 103 MMOL/L (ref 99–110)
CO2 SERPL-SCNC: 24 MMOL/L (ref 21–32)
CREAT SERPL-MCNC: 1 MG/DL (ref 0.6–1.1)
DEPRECATED RDW RBC AUTO: 17.7 % (ref 12.4–15.4)
EOSINOPHIL # BLD: 0.1 K/UL (ref 0–0.6)
EOSINOPHIL NFR BLD: 1 %
GFR SERPLBLD CREATININE-BSD FMLA CKD-EPI: 72 ML/MIN/{1.73_M2}
GLUCOSE BLD-MCNC: 117 MG/DL (ref 70–99)
GLUCOSE SERPL-MCNC: 124 MG/DL (ref 70–99)
HCG SERPL QL: NEGATIVE
HCT VFR BLD AUTO: 39.8 % (ref 36–48)
HGB BLD-MCNC: 12.9 G/DL (ref 12–16)
INR PPP: 0.93 (ref 0.85–1.15)
LYMPHOCYTES # BLD: 1.7 K/UL (ref 1–5.1)
LYMPHOCYTES NFR BLD: 19.9 %
MCH RBC QN AUTO: 26.9 PG (ref 26–34)
MCHC RBC AUTO-ENTMCNC: 32.5 G/DL (ref 31–36)
MCV RBC AUTO: 82.7 FL (ref 80–100)
MONOCYTES # BLD: 0.7 K/UL (ref 0–1.3)
MONOCYTES NFR BLD: 8.9 %
NEUTROPHILS # BLD: 5.8 K/UL (ref 1.7–7.7)
NEUTROPHILS NFR BLD: 69.3 %
PERFORMED ON: ABNORMAL
PLATELET # BLD AUTO: 316 K/UL (ref 135–450)
PMV BLD AUTO: 7.7 FL (ref 5–10.5)
POTASSIUM SERPL-SCNC: 4 MMOL/L (ref 3.5–5.1)
PROT SERPL-MCNC: 8.2 G/DL (ref 6.4–8.2)
PROTHROMBIN TIME: 12.7 SEC (ref 11.9–14.9)
RBC # BLD AUTO: 4.82 M/UL (ref 4–5.2)
SODIUM SERPL-SCNC: 139 MMOL/L (ref 136–145)
TROPONIN, HIGH SENSITIVITY: 13 NG/L (ref 0–14)
WBC # BLD AUTO: 8.3 K/UL (ref 4–11)

## 2024-07-24 PROCEDURE — 6370000000 HC RX 637 (ALT 250 FOR IP): Performed by: HOSPITALIST

## 2024-07-24 PROCEDURE — 70450 CT HEAD/BRAIN W/O DYE: CPT

## 2024-07-24 PROCEDURE — 6360000002 HC RX W HCPCS: Performed by: EMERGENCY MEDICINE

## 2024-07-24 PROCEDURE — 6370000000 HC RX 637 (ALT 250 FOR IP): Performed by: EMERGENCY MEDICINE

## 2024-07-24 PROCEDURE — 93005 ELECTROCARDIOGRAM TRACING: CPT | Performed by: EMERGENCY MEDICINE

## 2024-07-24 PROCEDURE — 85610 PROTHROMBIN TIME: CPT

## 2024-07-24 PROCEDURE — 99285 EMERGENCY DEPT VISIT HI MDM: CPT

## 2024-07-24 PROCEDURE — 36415 COLL VENOUS BLD VENIPUNCTURE: CPT

## 2024-07-24 PROCEDURE — 96374 THER/PROPH/DIAG INJ IV PUSH: CPT

## 2024-07-24 PROCEDURE — 6360000004 HC RX CONTRAST MEDICATION: Performed by: EMERGENCY MEDICINE

## 2024-07-24 PROCEDURE — 70496 CT ANGIOGRAPHY HEAD: CPT

## 2024-07-24 PROCEDURE — G0378 HOSPITAL OBSERVATION PER HR: HCPCS

## 2024-07-24 PROCEDURE — 84484 ASSAY OF TROPONIN QUANT: CPT

## 2024-07-24 PROCEDURE — 84703 CHORIONIC GONADOTROPIN ASSAY: CPT

## 2024-07-24 PROCEDURE — 96375 TX/PRO/DX INJ NEW DRUG ADDON: CPT

## 2024-07-24 PROCEDURE — 71045 X-RAY EXAM CHEST 1 VIEW: CPT

## 2024-07-24 PROCEDURE — 80053 COMPREHEN METABOLIC PANEL: CPT

## 2024-07-24 PROCEDURE — 85025 COMPLETE CBC W/AUTO DIFF WBC: CPT

## 2024-07-24 RX ORDER — POLYETHYLENE GLYCOL 3350 17 G/17G
17 POWDER, FOR SOLUTION ORAL DAILY PRN
Status: DISCONTINUED | OUTPATIENT
Start: 2024-07-24 | End: 2024-07-27 | Stop reason: HOSPADM

## 2024-07-24 RX ORDER — ACETAMINOPHEN 650 MG/1
650 SUPPOSITORY RECTAL EVERY 4 HOURS PRN
Status: DISCONTINUED | OUTPATIENT
Start: 2024-07-24 | End: 2024-07-27 | Stop reason: HOSPADM

## 2024-07-24 RX ORDER — ATORVASTATIN CALCIUM 40 MG/1
80 TABLET, FILM COATED ORAL NIGHTLY
Status: CANCELLED | OUTPATIENT
Start: 2024-07-24

## 2024-07-24 RX ORDER — ONDANSETRON 2 MG/ML
4 INJECTION INTRAMUSCULAR; INTRAVENOUS ONCE
Status: COMPLETED | OUTPATIENT
Start: 2024-07-24 | End: 2024-07-24

## 2024-07-24 RX ORDER — ONDANSETRON 4 MG/1
4 TABLET, ORALLY DISINTEGRATING ORAL EVERY 8 HOURS PRN
Status: DISCONTINUED | OUTPATIENT
Start: 2024-07-24 | End: 2024-07-27 | Stop reason: HOSPADM

## 2024-07-24 RX ORDER — ONDANSETRON 2 MG/ML
4 INJECTION INTRAMUSCULAR; INTRAVENOUS EVERY 6 HOURS PRN
Status: DISCONTINUED | OUTPATIENT
Start: 2024-07-24 | End: 2024-07-27 | Stop reason: HOSPADM

## 2024-07-24 RX ORDER — LABETALOL HYDROCHLORIDE 5 MG/ML
10 INJECTION, SOLUTION INTRAVENOUS EVERY 10 MIN PRN
Status: DISCONTINUED | OUTPATIENT
Start: 2024-07-24 | End: 2024-07-27 | Stop reason: HOSPADM

## 2024-07-24 RX ORDER — SODIUM CHLORIDE 9 MG/ML
INJECTION, SOLUTION INTRAVENOUS PRN
Status: DISCONTINUED | OUTPATIENT
Start: 2024-07-24 | End: 2024-07-27 | Stop reason: HOSPADM

## 2024-07-24 RX ORDER — MORPHINE SULFATE 4 MG/ML
4 INJECTION, SOLUTION INTRAMUSCULAR; INTRAVENOUS ONCE
Status: COMPLETED | OUTPATIENT
Start: 2024-07-24 | End: 2024-07-24

## 2024-07-24 RX ORDER — SODIUM CHLORIDE 0.9 % (FLUSH) 0.9 %
5-40 SYRINGE (ML) INJECTION EVERY 12 HOURS SCHEDULED
Status: DISCONTINUED | OUTPATIENT
Start: 2024-07-24 | End: 2024-07-27 | Stop reason: HOSPADM

## 2024-07-24 RX ORDER — ASPIRIN 81 MG/1
81 TABLET, CHEWABLE ORAL DAILY
Status: DISCONTINUED | OUTPATIENT
Start: 2024-07-25 | End: 2024-07-27 | Stop reason: HOSPADM

## 2024-07-24 RX ORDER — SODIUM CHLORIDE 0.9 % (FLUSH) 0.9 %
5-40 SYRINGE (ML) INJECTION PRN
Status: DISCONTINUED | OUTPATIENT
Start: 2024-07-24 | End: 2024-07-27 | Stop reason: HOSPADM

## 2024-07-24 RX ORDER — ASPIRIN 81 MG/1
324 TABLET, CHEWABLE ORAL ONCE
Status: COMPLETED | OUTPATIENT
Start: 2024-07-24 | End: 2024-07-24

## 2024-07-24 RX ORDER — ACETAMINOPHEN 325 MG/1
650 TABLET ORAL EVERY 4 HOURS PRN
Status: DISCONTINUED | OUTPATIENT
Start: 2024-07-24 | End: 2024-07-27 | Stop reason: HOSPADM

## 2024-07-24 RX ORDER — ASPIRIN 300 MG/1
300 SUPPOSITORY RECTAL DAILY
Status: DISCONTINUED | OUTPATIENT
Start: 2024-07-25 | End: 2024-07-27 | Stop reason: HOSPADM

## 2024-07-24 RX ADMIN — IOPAMIDOL 75 ML: 755 INJECTION, SOLUTION INTRAVENOUS at 19:23

## 2024-07-24 RX ADMIN — ONDANSETRON 4 MG: 4 TABLET, ORALLY DISINTEGRATING ORAL at 23:03

## 2024-07-24 RX ADMIN — ONDANSETRON 4 MG: 2 INJECTION INTRAMUSCULAR; INTRAVENOUS at 20:21

## 2024-07-24 RX ADMIN — MORPHINE SULFATE 4 MG: 4 INJECTION, SOLUTION INTRAMUSCULAR; INTRAVENOUS at 20:21

## 2024-07-24 RX ADMIN — ACETAMINOPHEN 325MG 650 MG: 325 TABLET ORAL at 23:02

## 2024-07-24 RX ADMIN — ASPIRIN 324 MG: 81 TABLET, CHEWABLE ORAL at 21:07

## 2024-07-24 ASSESSMENT — PAIN DESCRIPTION - LOCATION
LOCATION: NECK
LOCATION: NECK
LOCATION: ARM;LEG

## 2024-07-24 ASSESSMENT — PAIN DESCRIPTION - DESCRIPTORS
DESCRIPTORS: SHARP;SHOOTING
DESCRIPTORS: ACHING

## 2024-07-24 ASSESSMENT — PAIN DESCRIPTION - FREQUENCY: FREQUENCY: CONTINUOUS

## 2024-07-24 ASSESSMENT — PAIN - FUNCTIONAL ASSESSMENT
PAIN_FUNCTIONAL_ASSESSMENT: 0-10
PAIN_FUNCTIONAL_ASSESSMENT: PREVENTS OR INTERFERES SOME ACTIVE ACTIVITIES AND ADLS

## 2024-07-24 ASSESSMENT — PAIN SCALES - GENERAL
PAINLEVEL_OUTOF10: 3
PAINLEVEL_OUTOF10: 8
PAINLEVEL_OUTOF10: 8

## 2024-07-24 ASSESSMENT — PAIN DESCRIPTION - ORIENTATION
ORIENTATION: RIGHT
ORIENTATION: RIGHT

## 2024-07-24 ASSESSMENT — PAIN DESCRIPTION - PAIN TYPE: TYPE: ACUTE PAIN

## 2024-07-24 NOTE — ED TRIAGE NOTES
Pt states that was riding around in cart at [employer].  Began being unable to move neck from left to right.  Pain radiates  down into right arm.  States having weakness and numbness in right arm and leg.

## 2024-07-24 NOTE — ED PROVIDER NOTES
2008,2010,2011    CHOLECYSTECTOMY      DIALYSIS FISTULA CREATION      ENDOSCOPY, COLON, DIAGNOSTIC  08/27/2018    Esophagogastroduodenoscopy with biopsy Colonoscopy with polypectomy (snare cautery)    HAND TENDON SURGERY      thumb- 2002    TUBAL LIGATION  2011    UMBILICAL HERNIA REPAIR           CURRENT MEDICATIONS       Previous Medications    ACETAMINOPHEN (TYLENOL) 325 MG TABLET    Take 2 tablets by mouth every 6 hours as needed for Pain    AMLODIPINE (NORVASC) 10 MG TABLET    Take 1 tablet by mouth daily    ASPIRIN 81 MG EC TABLET    Take 1 tablet by mouth daily    ATORVASTATIN (LIPITOR) 40 MG TABLET    Take 1 tablet by mouth at bedtime    CITALOPRAM (CELEXA) 20 MG TABLET    Take 1 tablet by mouth daily    DULAGLUTIDE 3 MG/0.5ML SOPN    Inject 3 mg into the skin once a week On Sunday    ENTECAVIR (BARACLUDE) 0.5 MG TABLET    Take 1 tablet by mouth daily    FAMOTIDINE (PEPCID) 20 MG TABLET    Take 1 tablet by mouth 2 times daily    INSULIN GLARGINE (BASAGLAR KWIKPEN) 100 UNIT/ML INJECTION PEN    Inject 20 Units into the skin nightly    LISINOPRIL (PRINIVIL;ZESTRIL) 20 MG TABLET    Take 1 tablet by mouth daily    MYCOPHENOLATE (CELLCEPT) 250 MG CAPSULE    Take 4 capsules by mouth 2 times daily    TACROLIMUS (PROGRAF) 1 MG CAPSULE    Take 1.5 mg by mouth 2 times daily    VITAMIN C (ASCORBIC ACID) 500 MG TABLET    Take 1 tablet by mouth daily    VITAMIN D3 (CHOLECALCIFEROL) 25 MCG (1000 UT) TABS TABLET    Take 2 tablets by mouth daily       ALLERGIES     Hydrocodone-acetaminophen, Iv dye [iodides], and Naproxen    FAMILY HISTORY       Family History   Problem Relation Age of Onset    Diabetes Mother     Hypertension Mother     Other Maternal Grandmother         Rumatoid arthritis    Cancer Maternal Grandmother           SOCIAL HISTORY       Social History     Socioeconomic History    Marital status:      Spouse name: Ama    Number of children: 3    Years of education: 14    Highest education level: None  typical back pain.  There is no evidence of cauda equina syndrome.  No saddle anesthesia.  No incontinence bowel or bladder difficulties.  No radicular pain.  Deep tendon reflexes are intact    Social Determinants of health were reviewed (Poverty, Education, uninsured) -none       Is this patient to be included in the SEP-1 Core Measure due to severe sepsis or septic shock?   No   Exclusion criteria - the patient is NOT to be included for SEP-1 Core Measure due to:  2+ SIRS criteria are not met      REASSESSMENT/ MEDICAL DECISION MAKING            Patient was given the following medications:   Medications   aspirin chewable tablet 324 mg (has no administration in time range)   iopamidol (ISOVUE-370) 76 % injection 75 mL (75 mLs IntraVENous Given 7/24/24 1923)   morphine (PF) injection 4 mg (4 mg IntraVENous Given 7/24/24 2021)   ondansetron (ZOFRAN) injection 4 mg (4 mg IntraVENous Given 7/24/24 2021)        Lab results were reviewed.  White blood cell count is 8.3.  Hemoglobin 12.9.  Platelets 316.  Creatinine 1.0.  Glucose 124.  Electrolytes are normal.    Troponin is 13.  No suspicion for acute coronary syndrome.  No associated chest pain.  EKG reveals no acute changes.    Pregnancy test is negative.    Imaging results were reviewed.  Chest x-ray was reviewed.  No acute findings    CT head and CTA head and neck were reviewed.  No acute intracranial abnormality.  No hemorrhage.  No evidence of dissection or LVO.  No evidence of aortic dissection.    8:35 PM: The patient was reexamined.  She is feeling much improved.  Headache is resolved after receiving morphine 4 mg IV and Zofran 4 mg IV.  Blood pressure is now 151/100.  She is in sinus rhythm on the monitor.  Rate is 95.  Neck pain is resolved.  She states that she still has a little bit of soreness when turning her head from side-to-side but no severe pain.  She does complain of some mild low back pain.  No radicular pain.    Neurological exam is essentially

## 2024-07-25 ENCOUNTER — APPOINTMENT (OUTPATIENT)
Dept: MRI IMAGING | Age: 44
End: 2024-07-25
Payer: COMMERCIAL

## 2024-07-25 ENCOUNTER — APPOINTMENT (OUTPATIENT)
Age: 44
End: 2024-07-25
Attending: INTERNAL MEDICINE
Payer: COMMERCIAL

## 2024-07-25 PROBLEM — I63.9 ACUTE CVA (CEREBROVASCULAR ACCIDENT) (HCC): Status: ACTIVE | Noted: 2024-07-25

## 2024-07-25 LAB
CHOLEST SERPL-MCNC: 155 MG/DL (ref 0–199)
DEPRECATED RDW RBC AUTO: 17.6 % (ref 12.4–15.4)
ECHO BSA: 2.49 M2
EKG ATRIAL RATE: 102 BPM
EKG DIAGNOSIS: NORMAL
EKG P AXIS: 31 DEGREES
EKG P-R INTERVAL: 124 MS
EKG Q-T INTERVAL: 356 MS
EKG QRS DURATION: 86 MS
EKG QTC CALCULATION (BAZETT): 463 MS
EKG R AXIS: 6 DEGREES
EKG T AXIS: -3 DEGREES
EKG VENTRICULAR RATE: 102 BPM
EST. AVERAGE GLUCOSE BLD GHB EST-MCNC: 159.9 MG/DL
GLUCOSE BLD-MCNC: 130 MG/DL (ref 70–99)
GLUCOSE BLD-MCNC: 148 MG/DL (ref 70–99)
GLUCOSE BLD-MCNC: 194 MG/DL (ref 70–99)
GLUCOSE BLD-MCNC: 227 MG/DL (ref 70–99)
HBA1C MFR BLD: 7.2 %
HCT VFR BLD AUTO: 35 % (ref 36–48)
HDLC SERPL-MCNC: 45 MG/DL (ref 40–60)
HGB BLD-MCNC: 11.4 G/DL (ref 12–16)
LDLC SERPL CALC-MCNC: 88 MG/DL
MCH RBC QN AUTO: 26.8 PG (ref 26–34)
MCHC RBC AUTO-ENTMCNC: 32.6 G/DL (ref 31–36)
MCV RBC AUTO: 82.4 FL (ref 80–100)
PERFORMED ON: ABNORMAL
PLATELET # BLD AUTO: 251 K/UL (ref 135–450)
PMV BLD AUTO: 7.4 FL (ref 5–10.5)
RBC # BLD AUTO: 4.25 M/UL (ref 4–5.2)
TRIGL SERPL-MCNC: 112 MG/DL (ref 0–150)
VLDLC SERPL CALC-MCNC: 22 MG/DL
WBC # BLD AUTO: 6 K/UL (ref 4–11)

## 2024-07-25 PROCEDURE — 2580000003 HC RX 258: Performed by: HOSPITALIST

## 2024-07-25 PROCEDURE — 36415 COLL VENOUS BLD VENIPUNCTURE: CPT

## 2024-07-25 PROCEDURE — 6360000002 HC RX W HCPCS: Performed by: REGISTERED NURSE

## 2024-07-25 PROCEDURE — 97165 OT EVAL LOW COMPLEX 30 MIN: CPT

## 2024-07-25 PROCEDURE — 93308 TTE F-UP OR LMTD: CPT | Performed by: INTERNAL MEDICINE

## 2024-07-25 PROCEDURE — 97161 PT EVAL LOW COMPLEX 20 MIN: CPT

## 2024-07-25 PROCEDURE — 2060000000 HC ICU INTERMEDIATE R&B

## 2024-07-25 PROCEDURE — 6360000002 HC RX W HCPCS: Performed by: INTERNAL MEDICINE

## 2024-07-25 PROCEDURE — 94760 N-INVAS EAR/PLS OXIMETRY 1: CPT

## 2024-07-25 PROCEDURE — 97530 THERAPEUTIC ACTIVITIES: CPT

## 2024-07-25 PROCEDURE — 70551 MRI BRAIN STEM W/O DYE: CPT

## 2024-07-25 PROCEDURE — 93010 ELECTROCARDIOGRAM REPORT: CPT | Performed by: INTERNAL MEDICINE

## 2024-07-25 PROCEDURE — 80061 LIPID PANEL: CPT

## 2024-07-25 PROCEDURE — 85027 COMPLETE CBC AUTOMATED: CPT

## 2024-07-25 PROCEDURE — 6370000000 HC RX 637 (ALT 250 FOR IP): Performed by: HOSPITALIST

## 2024-07-25 PROCEDURE — 93308 TTE F-UP OR LMTD: CPT

## 2024-07-25 PROCEDURE — 6360000002 HC RX W HCPCS: Performed by: HOSPITALIST

## 2024-07-25 PROCEDURE — 83036 HEMOGLOBIN GLYCOSYLATED A1C: CPT

## 2024-07-25 PROCEDURE — 72141 MRI NECK SPINE W/O DYE: CPT

## 2024-07-25 PROCEDURE — 6370000000 HC RX 637 (ALT 250 FOR IP): Performed by: INTERNAL MEDICINE

## 2024-07-25 RX ORDER — TACROLIMUS 1 MG/1
2 CAPSULE ORAL
COMMUNITY

## 2024-07-25 RX ORDER — TACROLIMUS 1 MG/1
1 CAPSULE ORAL EVERY MORNING
Status: DISCONTINUED | OUTPATIENT
Start: 2024-07-25 | End: 2024-07-27 | Stop reason: HOSPADM

## 2024-07-25 RX ORDER — INSULIN GLARGINE 100 [IU]/ML
20 INJECTION, SOLUTION SUBCUTANEOUS 2 TIMES DAILY
Status: DISCONTINUED | OUTPATIENT
Start: 2024-07-25 | End: 2024-07-27 | Stop reason: HOSPADM

## 2024-07-25 RX ORDER — INSULIN LISPRO 100 [IU]/ML
15 INJECTION, SOLUTION INTRAVENOUS; SUBCUTANEOUS
COMMUNITY

## 2024-07-25 RX ORDER — CITALOPRAM 20 MG/1
20 TABLET ORAL NIGHTLY
Status: DISCONTINUED | OUTPATIENT
Start: 2024-07-25 | End: 2024-07-27 | Stop reason: HOSPADM

## 2024-07-25 RX ORDER — TACROLIMUS 1 MG/1
2 CAPSULE ORAL
Status: DISCONTINUED | OUTPATIENT
Start: 2024-07-25 | End: 2024-07-27 | Stop reason: HOSPADM

## 2024-07-25 RX ORDER — PREGABALIN 25 MG/1
25 CAPSULE ORAL 3 TIMES DAILY
Status: DISCONTINUED | OUTPATIENT
Start: 2024-07-25 | End: 2024-07-27 | Stop reason: HOSPADM

## 2024-07-25 RX ORDER — PREGABALIN 25 MG/1
25 CAPSULE ORAL 3 TIMES DAILY
COMMUNITY

## 2024-07-25 RX ORDER — INSULIN LISPRO 100 [IU]/ML
0-4 INJECTION, SOLUTION INTRAVENOUS; SUBCUTANEOUS
Status: DISCONTINUED | OUTPATIENT
Start: 2024-07-25 | End: 2024-07-27 | Stop reason: HOSPADM

## 2024-07-25 RX ORDER — INSULIN LISPRO 100 [IU]/ML
15 INJECTION, SOLUTION INTRAVENOUS; SUBCUTANEOUS
Status: DISCONTINUED | OUTPATIENT
Start: 2024-07-25 | End: 2024-07-27 | Stop reason: HOSPADM

## 2024-07-25 RX ORDER — CYCLOBENZAPRINE HCL 10 MG
10 TABLET ORAL NIGHTLY
Status: DISCONTINUED | OUTPATIENT
Start: 2024-07-25 | End: 2024-07-27 | Stop reason: HOSPADM

## 2024-07-25 RX ORDER — TACROLIMUS 0.5 MG/1
0.5 CAPSULE ORAL EVERY MORNING
COMMUNITY

## 2024-07-25 RX ORDER — TACROLIMUS 0.5 MG/1
0.5 CAPSULE ORAL EVERY MORNING
Status: DISCONTINUED | OUTPATIENT
Start: 2024-07-25 | End: 2024-07-27 | Stop reason: HOSPADM

## 2024-07-25 RX ORDER — GLUCAGON 1 MG/ML
1 KIT INJECTION PRN
Status: DISCONTINUED | OUTPATIENT
Start: 2024-07-25 | End: 2024-07-27 | Stop reason: HOSPADM

## 2024-07-25 RX ORDER — DEXTROSE MONOHYDRATE 100 MG/ML
INJECTION, SOLUTION INTRAVENOUS CONTINUOUS PRN
Status: DISCONTINUED | OUTPATIENT
Start: 2024-07-25 | End: 2024-07-27 | Stop reason: HOSPADM

## 2024-07-25 RX ORDER — MYCOPHENOLATE MOFETIL 250 MG/1
1000 CAPSULE ORAL 2 TIMES DAILY
Status: DISCONTINUED | OUTPATIENT
Start: 2024-07-25 | End: 2024-07-27 | Stop reason: HOSPADM

## 2024-07-25 RX ORDER — MORPHINE SULFATE 2 MG/ML
2 INJECTION, SOLUTION INTRAMUSCULAR; INTRAVENOUS EVERY 4 HOURS PRN
Status: DISCONTINUED | OUTPATIENT
Start: 2024-07-25 | End: 2024-07-25

## 2024-07-25 RX ORDER — CYCLOBENZAPRINE HCL 5 MG
10 TABLET ORAL NIGHTLY
COMMUNITY

## 2024-07-25 RX ORDER — ENTECAVIR 1 MG/1
0.5 TABLET, FILM COATED ORAL DAILY
Status: DISCONTINUED | OUTPATIENT
Start: 2024-07-25 | End: 2024-07-26

## 2024-07-25 RX ORDER — FAMOTIDINE 20 MG/1
20 TABLET, FILM COATED ORAL DAILY
Status: DISCONTINUED | OUTPATIENT
Start: 2024-07-25 | End: 2024-07-27 | Stop reason: HOSPADM

## 2024-07-25 RX ORDER — INSULIN LISPRO 100 [IU]/ML
0-4 INJECTION, SOLUTION INTRAVENOUS; SUBCUTANEOUS NIGHTLY
Status: DISCONTINUED | OUTPATIENT
Start: 2024-07-25 | End: 2024-07-27 | Stop reason: HOSPADM

## 2024-07-25 RX ADMIN — TACROLIMUS 2 MG: 1 CAPSULE ORAL at 22:00

## 2024-07-25 RX ADMIN — SODIUM CHLORIDE, PRESERVATIVE FREE 10 ML: 5 INJECTION INTRAVENOUS at 21:08

## 2024-07-25 RX ADMIN — SODIUM CHLORIDE, PRESERVATIVE FREE 10 ML: 5 INJECTION INTRAVENOUS at 00:18

## 2024-07-25 RX ADMIN — MORPHINE SULFATE 2 MG: 2 INJECTION, SOLUTION INTRAMUSCULAR; INTRAVENOUS at 00:18

## 2024-07-25 RX ADMIN — CYCLOBENZAPRINE 10 MG: 10 TABLET, FILM COATED ORAL at 20:59

## 2024-07-25 RX ADMIN — ASPIRIN 81 MG 81 MG: 81 TABLET ORAL at 09:24

## 2024-07-25 RX ADMIN — PREGABALIN 25 MG: 25 CAPSULE ORAL at 12:39

## 2024-07-25 RX ADMIN — MYCOPHENOLATE MOFETIL 1000 MG: 250 CAPSULE ORAL at 22:00

## 2024-07-25 RX ADMIN — INSULIN GLARGINE 20 UNITS: 100 INJECTION, SOLUTION SUBCUTANEOUS at 12:36

## 2024-07-25 RX ADMIN — APIXABAN 5 MG: 5 TABLET, FILM COATED ORAL at 20:59

## 2024-07-25 RX ADMIN — SODIUM CHLORIDE, PRESERVATIVE FREE 10 ML: 5 INJECTION INTRAVENOUS at 09:24

## 2024-07-25 RX ADMIN — HYDROMORPHONE HYDROCHLORIDE 0.25 MG: 1 INJECTION, SOLUTION INTRAMUSCULAR; INTRAVENOUS; SUBCUTANEOUS at 23:40

## 2024-07-25 RX ADMIN — MORPHINE SULFATE 2 MG: 2 INJECTION, SOLUTION INTRAMUSCULAR; INTRAVENOUS at 04:30

## 2024-07-25 RX ADMIN — FAMOTIDINE 20 MG: 20 TABLET, FILM COATED ORAL at 12:42

## 2024-07-25 RX ADMIN — MORPHINE SULFATE 2 MG: 2 INJECTION, SOLUTION INTRAMUSCULAR; INTRAVENOUS at 17:56

## 2024-07-25 RX ADMIN — MORPHINE SULFATE 2 MG: 2 INJECTION, SOLUTION INTRAMUSCULAR; INTRAVENOUS at 09:29

## 2024-07-25 RX ADMIN — CITALOPRAM HYDROBROMIDE 20 MG: 20 TABLET ORAL at 20:59

## 2024-07-25 RX ADMIN — PREGABALIN 25 MG: 25 CAPSULE ORAL at 20:59

## 2024-07-25 RX ADMIN — ONDANSETRON 4 MG: 2 INJECTION INTRAMUSCULAR; INTRAVENOUS at 18:57

## 2024-07-25 RX ADMIN — INSULIN GLARGINE 20 UNITS: 100 INJECTION, SOLUTION SUBCUTANEOUS at 20:59

## 2024-07-25 ASSESSMENT — PAIN DESCRIPTION - ORIENTATION
ORIENTATION: RIGHT;MID
ORIENTATION: RIGHT

## 2024-07-25 ASSESSMENT — PAIN DESCRIPTION - DESCRIPTORS
DESCRIPTORS: ACHING
DESCRIPTORS: DISCOMFORT;ACHING
DESCRIPTORS: ACHING

## 2024-07-25 ASSESSMENT — PAIN DESCRIPTION - LOCATION
LOCATION: BACK;LEG
LOCATION: BACK;LEG
LOCATION: ARM;BACK;LEG
LOCATION: ARM;LEG;BACK
LOCATION: ARM;BACK;LEG

## 2024-07-25 ASSESSMENT — PAIN SCALES - GENERAL
PAINLEVEL_OUTOF10: 8
PAINLEVEL_OUTOF10: 8
PAINLEVEL_OUTOF10: 3
PAINLEVEL_OUTOF10: 2
PAINLEVEL_OUTOF10: 3
PAINLEVEL_OUTOF10: 7
PAINLEVEL_OUTOF10: 7
PAINLEVEL_OUTOF10: 8

## 2024-07-25 ASSESSMENT — PAIN - FUNCTIONAL ASSESSMENT
PAIN_FUNCTIONAL_ASSESSMENT: PREVENTS OR INTERFERES SOME ACTIVE ACTIVITIES AND ADLS
PAIN_FUNCTIONAL_ASSESSMENT: ACTIVITIES ARE NOT PREVENTED
PAIN_FUNCTIONAL_ASSESSMENT: PREVENTS OR INTERFERES SOME ACTIVE ACTIVITIES AND ADLS
PAIN_FUNCTIONAL_ASSESSMENT: PREVENTS OR INTERFERES SOME ACTIVE ACTIVITIES AND ADLS

## 2024-07-25 ASSESSMENT — PAIN DESCRIPTION - FREQUENCY: FREQUENCY: CONTINUOUS

## 2024-07-25 ASSESSMENT — PAIN DESCRIPTION - PAIN TYPE: TYPE: ACUTE PAIN

## 2024-07-25 NOTE — PLAN OF CARE
Problem: Discharge Planning  Goal: Discharge to home or other facility with appropriate resources  Outcome: Progressing     Problem: Pain  Goal: Verbalizes/displays adequate comfort level or baseline comfort level  Outcome: Progressing     Problem: Safety - Adult  Goal: Free from fall injury  Outcome: Progressing     Problem: ABCDS Injury Assessment  Goal: Absence of physical injury  Outcome: Progressing     Problem: Neurosensory - Adult  Goal: Achieves stable or improved neurological status  Outcome: Progressing  Goal: Absence of seizures  Outcome: Progressing  Goal: Remains free of injury related to seizures activity  Outcome: Progressing  Goal: Achieves maximal functionality and self care  Outcome: Progressing

## 2024-07-25 NOTE — PROGRESS NOTES
Pharmacy Medication Reconciliation Note     List of medications patient is currently taking is complete.    Source of information:   1. Rx disp history  2. MD office visits  3. Patient interview and bottles brought from home    Notes regarding home medications:   1. Updated Tacrolimus dose to 1.5mg QAM 2mg QHS  2. Added Eliquis 5mg bid (Blood clot June 2024)  3. Added Lispro 15 units tid and updated Lantus to 20 units bid.  4. Added Cyclobenzaprine 10mg QHS  5. Added Ozempic 2mg weekly  6. Added Pregabalin 25mg tid  7. Removed Atorvastatin & Dulaglutide    Denies taking any other OTC or herbal medications    Jayant Yoder RPH, RP 7/25/2024 8:46 AM

## 2024-07-25 NOTE — PROGRESS NOTES
Pt admitted to PCU room 5270. Pt placed on telemetry, normal sinus rhythm on monitor, VSS. NIH score 1. Admission paperwork, assessment, and MRI screening form complete. IV remains patent. Pt passed swallow assessment, see diet orders. Pt reports she took her nightly tacrolimus 1 mg and mycophenolate 250 mg from her home medications while she was in the ED this evening. Pt and spouse updated on plan of care. Standard safety precautions in place. Care continues.    Electronically signed by Adri Mcnulty RN on 7/25/24 at 12:31 AM EDT

## 2024-07-25 NOTE — PROGRESS NOTES
Patient had no major events through the night. VSS. NIH stayed at a 1. Pt continues to c/o of swelling, numbness and pain on right side. Pt resting comfortably in bed, NS on monitor. Bedside table and call light within reach.

## 2024-07-25 NOTE — ED NOTES
Introduce myself to the patient, identification band inplace, stretcher in the lowest position for safety, and the call light is in reach. Updated patient on Emergency Department plan of care, no additional needs at this time, will continue with care.    I placed a no no sleeve on her right arm.

## 2024-07-25 NOTE — PROGRESS NOTES
V2.0    [facility] [place] Progress Note      Name:  Jasmina Hahn /Age/Sex: 1980  (44 y.o. female)   MRN & CSN:  [id number] & [id number] Encounter Date/Time: 2024 10:34 AM EDT   Location:  [id number]/5270-01 PCP: Cassie Harman MD     Attending:Tavares Kumar MD       Hospital Day: 2    Assessment and Recommendations   Jasmina Hahn is a 44 y.o. female who presents with Stroke-like symptoms      Plan:   Possible stroke, aspirin, present on admission neurochecks initially echo ordered apparently patient had an echo back in  canceled echo pending MRI, MRI positive for possible punctuate infarct also with chronic vascular disease possibly watershed discussed with cardiology and bubble study ordered added neurology consult okay for permissive hypertension  Morbid obesity complicating current presentation increasing risk for morbidity mortality counseled for weight loss  Essential hypertension permissive hypertension for now consider restarting p.o. medication in a.m.  Recent DVT and pulmonary embolism, Eliquis on hold at this time stroke if positive not significant waiting for neurology recommendations hopefully to restart Eliquis today discussed with nursing staff discussed with patient  S/p renal transplant  Diabetes mellitus sliding scale  Reported obstructive cardiomyopathy, discussed with Dr. Sandy appears dynamic obstruction no further intervention or evaluation per Dr. Sandy needs to follow-up with cardiology as outpatient      Diet ADULT DIET; Regular; 5 carb choices (75 gm/meal); Low Fat/Low Chol/High Fiber/MAGDY   DVT Prophylaxis [] Lovenox, []  Heparin, [] SCDs, [] Ambulation,  [] Eliquis, [] Xarelto  [] Coumadin   Code Status Full Code             Personally reviewed Lab Studies and Imaging     Discussed management of the case with cardiology    Telemetry strip reviewed no ST elevation      Medical Decision Making:  The following items were considered in medical decision

## 2024-07-25 NOTE — ED NOTES
Handoff report given to FILIPE Rush to assume care. No further questions at this time. I will transport this pt up to room 5270. If any questions arise, please don't hesitate to call [phone].

## 2024-07-25 NOTE — H&P
V2.0  History and Physical      Name:  Jasmina Hahn /Age/Sex: 1980  (44 y.o. female)   MRN & CSN:  [id number] & [id number] Encounter Date/Time: 2024 9:07 PM EDT   [facility] PCP: Cassie Harman MD       Hospital Day: 1    Assessment and Plan:   Jasmina Hahn is a 44 y.o. female with a pmh of hypertension; diabetes mellitus; and multiple surgeries who presents with Stroke-like symptoms      Principal Problem:    Stroke-like symptoms  Active Problems:    Morbid obesity due to excess calories (HCC)    HTN (hypertension)    Type 2 diabetes mellitus with retinopathy, with long-term current use of insulin (HCC)  Resolved Problems:    * No resolved hospital problems. *     Plan:  Serial NINDS NIH Scale ordered  Lipid profile and A1c ordered.  Physical therapy, and occupational therapy to work with patient.  N.p.o. until bedside swallow eval.  Daily aspirin.  High intensity statin  Permissive hypertension.  Control blood pressure with labetalol for systolic blood pressure of more than 220 or diastolic blood pressure of more than 120.  MRI of head; brain; and neck.  Echocardiogram ordered.  Blood glucose is stable.  De-escalate dose of long-acting insulin.  Critical correction scale insulin ordered.  Advanced care planning was discussed with patient for a total of 16 minutes.  Full code, DNR CC, DNR CCA, power of  and living will were discussed.  Patient elected to be full code  Disposition:   Current Living situation: House  Expected Disposition: Home  Estimated D/C: In 2 days    Diet Diet NPO.  Advance to diabetic cardiac diet if patient passes swallow eval.   DVT Prophylaxis [] Lovenox, []  Heparin, [] SCDs, [] Ambulation,  [x] Eliquis, [] Xarelto, [] Coumadin   Code Status Full code   Surrogate Decision Maker/ POA      Personally reviewed Lab Studies and Imaging     Discussed management of the case with  ED provider who recommended admission    EKG interpreted  VERTEBRAL ARTERIES: No dissection, arterial injury, or significant stenosis. SOFT TISSUES: The lung apices are clear.  No cervical or superior mediastinal lymphadenopathy.  The larynx and pharynx are unremarkable.  No acute abnormality of the salivary and thyroid glands. BONES: No acute osseous abnormality. CTA HEAD: ANTERIOR CIRCULATION: No significant stenosis of the intracranial internal carotid, anterior cerebral, or middle cerebral arteries. No aneurysm. Calcified plaque in the bilateral intracranial internal carotid arteries. POSTERIOR CIRCULATION: No significant stenosis of the vertebral, basilar, or posterior cerebral arteries. No aneurysm. OTHER: No dural venous sinus thrombosis on this non-dedicated study.     1. No acute intracranial abnormality. 2. No acute abnormality in the large arteries of the head and neck.  No LVO or dissection. Stroke CT head findings were communicated by [facility] to Dr. Alvaro Claudio on 07/24/2024 at 7:30 p.m.         Electronically signed by Rick Cardona MD on 7/24/2024 at 9:07 PM

## 2024-07-25 NOTE — PROGRESS NOTES
this note will serve as a discharge summary.  Please see below for the latest assessment towards goals.      Treatment Diagnosis: Decreased strength and functional mobility 2/2 stroke-like symptoms  Therapy Prognosis: Good  Decision Making: Low Complexity  History: See below  Exam: Strength; ROM; Balance; Ambulation  Clinical Presentation: Stable  Barriers to Learning: Pain; Weakness  Requires PT Follow-Up: Yes  Activity Tolerance  Activity Tolerance: Patient tolerated evaluation without incident     Plan   Physical Therapy Plan  General Plan: 2-3 times per week  Current Treatment Recommendations: Strengthening, ROM, Balance training, Gait training, Stair training, Functional mobility training, Home exercise program, Safety education & training, Transfer training, Neuromuscular re-education, Equipment evaluation, education, & procurement, Endurance training, Patient/Caregiver education & training, ADL/Self-care training  Safety Devices  Type of Devices: All fall risk precautions in place, Call light within reach, Left in bed, Bed alarm in place, Nurse notified  Restraints  Restraints Initially in Place: No     Restrictions  Restrictions/Precautions  Restrictions/Precautions: Fall Risk     Subjective   General  Chart Reviewed: Yes  Patient assessed for rehabilitation services?: Yes  Additional Pertinent Hx: \"Jasmina Hahn is a 44 y.o. female with a pmh of hypertension; diabetes mellitus; and multiple surgeries who presents with Stroke-like symptoms.\"  Response To Previous Treatment: Not applicable  Referring Practitioner: Rick Cardona MD  Referral Date : 07/24/24  Diagnosis: CVA r/o; HTN; DMII  Follows Commands: Within Functional Limits  Subjective  Subjective: Pleasant and agreeable to evaluation.  C/o intermittent neck, RUE, and back pain.  Also reports R-sided weakness.         Social/Functional History  Social/Functional History  Lives With: Spouse  Type of Home: House  Home Layout: One level, Able to

## 2024-07-25 NOTE — PLAN OF CARE
Problem: Discharge Planning  Goal: Discharge to home or other facility with appropriate resources  Outcome: Progressing     Problem: Pain  Goal: Verbalizes/displays adequate comfort level or baseline comfort level  Outcome: Progressing     Problem: Safety - Adult  Goal: Free from fall injury  Outcome: Progressing     Problem: ABCDS Injury Assessment  Goal: Absence of physical injury  Outcome: Progressing     Problem: Neurosensory - Adult  Goal: Achieves stable or improved neurological status  Outcome: Progressing  Goal: Absence of seizures  Outcome: Progressing  Goal: Remains free of injury related to seizures activity  Outcome: Progressing  Goal: Achieves maximal functionality and self care  Outcome: Progressing      57-year-old female with a history of gastritis, aortic regurgitation presents with midsternal chest pain that started 45 minutes prior to arrival in the ED.  Patient states that she finished eating a large meal 30 minutes prior to symptom onset.  She reports sharp midsternal chest pain and tightness that radiates to her right upper back and right posterior shoulder.  It is associated with nausea and V x 3.  She took 4 Tums without relief.  Patient states the pain is slightly worse with inspiration, but denies shortness of breath, diaphoresis.  Patient was sent to ED from urgent care.  Patient reports that she had similar pain 2 weeks ago that also occurred shortly after eating.  She did not seek an evaluation at that time.  Patient has a family history of cardiac disease, states her father passed away in his 60s of an MI.  PMD Dr. Aguilera, Cardiology Dr. Díaz    AO x 3 in NAD  RRR, S1S2, no JVD  Lungs CTA b/l  Abd soft, NT/NG  No LE edema  No focal neuro deficits  Moist mucous membranes
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
## 2024-07-25 NOTE — PROGRESS NOTES
Occupational Therapy  Facility/Department: [address] PROGRESSIVE CARE  Occupational Therapy Initial Assessment    Name: Jasmina Hahn  : 1980  MRN: [id number]  Date of Service: 2024    Discharge Recommendations:  Home with assist PRN  OT Equipment Recommendations  Other: Cont to assess  Jasmina Hahn scored a / on the AM-[facility] ADL Inpatient form.  At this time, no further OT is recommended upon discharge due to support from .  Recommend patient returns to prior setting with prior services.        Patient Diagnosis(es): The primary encounter diagnosis was Acute right-sided weakness. Diagnoses of Right sided numbness, Nonintractable headache, unspecified chronicity pattern, unspecified headache type, Acute exacerbation of chronic low back pain, and Cerebrovascular accident (CVA) due to embolism of left middle cerebral artery (HCC) were also pertinent to this visit.  Past Medical History:  has a past medical history of Anemia, Chronic kidney disease, Diabetes mellitus (HCC), Hypertension, and Obesity.  Past Surgical History:  has a past surgical history that includes Cholecystectomy;  section (,,); Umbilical hernia repair; Tubal ligation (); Hand tendon surgery; Dialysis fistula creation; and Endoscopy, colon, diagnostic (2018).    Treatment Diagnosis: decreased fxl transfers/mob and ADL status      Assessment   Performance deficits / Impairments: Decreased functional mobility ;Decreased ADL status;Decreased strength;Decreased sensation;Decreased balance  Assessment: Pt is a 44 y.o. F admitted for CVA R/O. PTA, she reported being Ind w/ fxl mob and self care. MRI from  showing multilevel lumbar spondylosis and facet arthropathy. MRI brain pending. Today- pt is functioning below her baseline as she required SBA for bed mob and fxl transfers/mob. Pt declining ADLs this date. Pt is anticipated to require up to CGA . She is most limited by her pain. She will  No

## 2024-07-25 NOTE — PROGRESS NOTES
4 Eyes Skin Assessment     NAME:  Jasmina Hahn  YOB: 1980  MEDICAL RECORD NUMBER:  [id number]    The patient is being assessed for  Admission    I agree that at least one RN has performed a thorough Head to Toe Skin Assessment on the patient. ALL assessment sites listed below have been assessed.      Areas assessed by both nurses:    Head, Face, Ears, Shoulders, Back, Chest, Arms, Elbows, Hands, Sacrum. Buttock, Coccyx, Ischium, Legs. Feet and Heels, and Under Medical Devices         Does the Patient have a Wound? No noted wound(s)       Navdeep Prevention initiated by RN: No  Wound Care Orders initiated by RN: No    Pressure Injury (Stage 3,4, Unstageable, DTI, NWPT, and Complex wounds) if present, place Wound referral order by RN under : No    New Ostomies, if present place, Ostomy referral order under : No     Nurse 1 eSignature: Electronically signed by Adri Mcnulty RN on 7/25/24 at 12:31 AM EDT    **SHARE this note so that the co-signing nurse can place an eSignature**    Nurse 2 eSignature: Electronically signed by Andi Vogt RN on 7/25/24 at 12:32 AM EDT

## 2024-07-26 LAB
ALBUMIN SERPL-MCNC: 3.7 G/DL (ref 3.4–5)
ALBUMIN/GLOB SERPL: 1.1 {RATIO} (ref 1.1–2.2)
ALP SERPL-CCNC: 70 U/L (ref 40–129)
ALT SERPL-CCNC: 19 U/L (ref 10–40)
ANION GAP SERPL CALCULATED.3IONS-SCNC: 9 MMOL/L (ref 3–16)
AST SERPL-CCNC: 13 U/L (ref 15–37)
BASOPHILS # BLD: 0 K/UL (ref 0–0.2)
BASOPHILS NFR BLD: 0.8 %
BILIRUB SERPL-MCNC: 0.3 MG/DL (ref 0–1)
BUN SERPL-MCNC: 12 MG/DL (ref 7–20)
CALCIUM SERPL-MCNC: 8.9 MG/DL (ref 8.3–10.6)
CHLORIDE SERPL-SCNC: 103 MMOL/L (ref 99–110)
CO2 SERPL-SCNC: 26 MMOL/L (ref 21–32)
CREAT SERPL-MCNC: 0.9 MG/DL (ref 0.6–1.1)
DEPRECATED RDW RBC AUTO: 17.7 % (ref 12.4–15.4)
EOSINOPHIL # BLD: 0.1 K/UL (ref 0–0.6)
EOSINOPHIL NFR BLD: 2.2 %
GFR SERPLBLD CREATININE-BSD FMLA CKD-EPI: 82 ML/MIN/{1.73_M2}
GLUCOSE BLD-MCNC: 175 MG/DL (ref 70–99)
GLUCOSE BLD-MCNC: 189 MG/DL (ref 70–99)
GLUCOSE BLD-MCNC: 208 MG/DL (ref 70–99)
GLUCOSE BLD-MCNC: 222 MG/DL (ref 70–99)
GLUCOSE BLD-MCNC: 313 MG/DL (ref 70–99)
GLUCOSE SERPL-MCNC: 178 MG/DL (ref 70–99)
HCT VFR BLD AUTO: 36.2 % (ref 36–48)
HGB BLD-MCNC: 11.7 G/DL (ref 12–16)
LYMPHOCYTES # BLD: 1 K/UL (ref 1–5.1)
LYMPHOCYTES NFR BLD: 18.3 %
MCH RBC QN AUTO: 26.7 PG (ref 26–34)
MCHC RBC AUTO-ENTMCNC: 32.2 G/DL (ref 31–36)
MCV RBC AUTO: 82.8 FL (ref 80–100)
MONOCYTES # BLD: 0.6 K/UL (ref 0–1.3)
MONOCYTES NFR BLD: 9.7 %
NEUTROPHILS # BLD: 3.9 K/UL (ref 1.7–7.7)
NEUTROPHILS NFR BLD: 69 %
PERFORMED ON: ABNORMAL
PLATELET # BLD AUTO: 267 K/UL (ref 135–450)
PMV BLD AUTO: 7.7 FL (ref 5–10.5)
POTASSIUM SERPL-SCNC: 4 MMOL/L (ref 3.5–5.1)
PROT SERPL-MCNC: 7 G/DL (ref 6.4–8.2)
RBC # BLD AUTO: 4.38 M/UL (ref 4–5.2)
SODIUM SERPL-SCNC: 138 MMOL/L (ref 136–145)
WBC # BLD AUTO: 5.7 K/UL (ref 4–11)

## 2024-07-26 PROCEDURE — 6370000000 HC RX 637 (ALT 250 FOR IP): Performed by: INTERNAL MEDICINE

## 2024-07-26 PROCEDURE — 2060000000 HC ICU INTERMEDIATE R&B

## 2024-07-26 PROCEDURE — 6370000000 HC RX 637 (ALT 250 FOR IP): Performed by: HOSPITALIST

## 2024-07-26 PROCEDURE — 6360000002 HC RX W HCPCS: Performed by: INTERNAL MEDICINE

## 2024-07-26 PROCEDURE — 80053 COMPREHEN METABOLIC PANEL: CPT

## 2024-07-26 PROCEDURE — 36415 COLL VENOUS BLD VENIPUNCTURE: CPT

## 2024-07-26 PROCEDURE — 6360000002 HC RX W HCPCS: Performed by: REGISTERED NURSE

## 2024-07-26 PROCEDURE — 97116 GAIT TRAINING THERAPY: CPT

## 2024-07-26 PROCEDURE — 97535 SELF CARE MNGMENT TRAINING: CPT

## 2024-07-26 PROCEDURE — 2580000003 HC RX 258: Performed by: HOSPITALIST

## 2024-07-26 PROCEDURE — 85025 COMPLETE CBC W/AUTO DIFF WBC: CPT

## 2024-07-26 PROCEDURE — 97110 THERAPEUTIC EXERCISES: CPT

## 2024-07-26 PROCEDURE — 97530 THERAPEUTIC ACTIVITIES: CPT

## 2024-07-26 PROCEDURE — 94760 N-INVAS EAR/PLS OXIMETRY 1: CPT

## 2024-07-26 RX ORDER — LISINOPRIL 20 MG/1
20 TABLET ORAL DAILY
Status: DISCONTINUED | OUTPATIENT
Start: 2024-07-26 | End: 2024-07-27 | Stop reason: HOSPADM

## 2024-07-26 RX ORDER — AMLODIPINE BESYLATE 10 MG/1
10 TABLET ORAL DAILY
Status: DISCONTINUED | OUTPATIENT
Start: 2024-07-26 | End: 2024-07-27 | Stop reason: HOSPADM

## 2024-07-26 RX ORDER — VITAMIN B COMPLEX
4000 TABLET ORAL DAILY
Status: DISCONTINUED | OUTPATIENT
Start: 2024-07-26 | End: 2024-07-27 | Stop reason: HOSPADM

## 2024-07-26 RX ORDER — ENTECAVIR 0.5 MG/1
0.5 TABLET, FILM COATED ORAL DAILY
Status: DISCONTINUED | OUTPATIENT
Start: 2024-07-26 | End: 2024-07-27 | Stop reason: HOSPADM

## 2024-07-26 RX ADMIN — CITALOPRAM HYDROBROMIDE 20 MG: 20 TABLET ORAL at 20:38

## 2024-07-26 RX ADMIN — MYCOPHENOLATE MOFETIL 1000 MG: 250 CAPSULE ORAL at 10:52

## 2024-07-26 RX ADMIN — APIXABAN 5 MG: 5 TABLET, FILM COATED ORAL at 10:38

## 2024-07-26 RX ADMIN — PREGABALIN 25 MG: 25 CAPSULE ORAL at 15:56

## 2024-07-26 RX ADMIN — PREGABALIN 25 MG: 25 CAPSULE ORAL at 20:38

## 2024-07-26 RX ADMIN — TACROLIMUS 2 MG: 1 CAPSULE ORAL at 22:51

## 2024-07-26 RX ADMIN — INSULIN LISPRO 15 UNITS: 100 INJECTION, SOLUTION INTRAVENOUS; SUBCUTANEOUS at 18:48

## 2024-07-26 RX ADMIN — SODIUM CHLORIDE, PRESERVATIVE FREE 10 ML: 5 INJECTION INTRAVENOUS at 10:53

## 2024-07-26 RX ADMIN — INSULIN GLARGINE 20 UNITS: 100 INJECTION, SOLUTION SUBCUTANEOUS at 20:38

## 2024-07-26 RX ADMIN — INSULIN LISPRO 1 UNITS: 100 INJECTION, SOLUTION INTRAVENOUS; SUBCUTANEOUS at 18:49

## 2024-07-26 RX ADMIN — AMLODIPINE BESYLATE 10 MG: 10 TABLET ORAL at 10:48

## 2024-07-26 RX ADMIN — INSULIN GLARGINE 20 UNITS: 100 INJECTION, SOLUTION SUBCUTANEOUS at 10:41

## 2024-07-26 RX ADMIN — MYCOPHENOLATE MOFETIL 1000 MG: 250 CAPSULE ORAL at 22:52

## 2024-07-26 RX ADMIN — FAMOTIDINE 20 MG: 20 TABLET, FILM COATED ORAL at 10:38

## 2024-07-26 RX ADMIN — INSULIN LISPRO 15 UNITS: 100 INJECTION, SOLUTION INTRAVENOUS; SUBCUTANEOUS at 10:41

## 2024-07-26 RX ADMIN — CYCLOBENZAPRINE 10 MG: 10 TABLET, FILM COATED ORAL at 20:37

## 2024-07-26 RX ADMIN — ASPIRIN 81 MG 81 MG: 81 TABLET ORAL at 10:38

## 2024-07-26 RX ADMIN — TACROLIMUS 0.5 MG: 0.5 CAPSULE ORAL at 10:48

## 2024-07-26 RX ADMIN — PREGABALIN 25 MG: 25 CAPSULE ORAL at 10:38

## 2024-07-26 RX ADMIN — INSULIN LISPRO 4 UNITS: 100 INJECTION, SOLUTION INTRAVENOUS; SUBCUTANEOUS at 21:03

## 2024-07-26 RX ADMIN — APIXABAN 5 MG: 5 TABLET, FILM COATED ORAL at 20:38

## 2024-07-26 RX ADMIN — HYDROMORPHONE HYDROCHLORIDE 0.25 MG: 1 INJECTION, SOLUTION INTRAMUSCULAR; INTRAVENOUS; SUBCUTANEOUS at 20:51

## 2024-07-26 RX ADMIN — TACROLIMUS 1 MG: 1 CAPSULE ORAL at 10:48

## 2024-07-26 RX ADMIN — HYDROMORPHONE HYDROCHLORIDE 0.25 MG: 1 INJECTION, SOLUTION INTRAMUSCULAR; INTRAVENOUS; SUBCUTANEOUS at 10:35

## 2024-07-26 RX ADMIN — SODIUM CHLORIDE, PRESERVATIVE FREE 10 ML: 5 INJECTION INTRAVENOUS at 20:38

## 2024-07-26 RX ADMIN — ENTECAVIR 0.5 MG: 0.5 TABLET, FILM COATED ORAL at 10:48

## 2024-07-26 RX ADMIN — LISINOPRIL 20 MG: 20 TABLET ORAL at 10:48

## 2024-07-26 ASSESSMENT — PAIN DESCRIPTION - DESCRIPTORS
DESCRIPTORS: ACHING
DESCRIPTORS: ACHING

## 2024-07-26 ASSESSMENT — PAIN - FUNCTIONAL ASSESSMENT
PAIN_FUNCTIONAL_ASSESSMENT: PREVENTS OR INTERFERES SOME ACTIVE ACTIVITIES AND ADLS
PAIN_FUNCTIONAL_ASSESSMENT: PREVENTS OR INTERFERES SOME ACTIVE ACTIVITIES AND ADLS

## 2024-07-26 ASSESSMENT — PAIN DESCRIPTION - LOCATION: LOCATION: BACK;ARM;LEG

## 2024-07-26 ASSESSMENT — PAIN DESCRIPTION - ORIENTATION
ORIENTATION: RIGHT

## 2024-07-26 ASSESSMENT — PAIN SCALES - GENERAL
PAINLEVEL_OUTOF10: 7
PAINLEVEL_OUTOF10: 5
PAINLEVEL_OUTOF10: 7

## 2024-07-26 NOTE — PROGRESS NOTES
Pt reports little pain relief from PRN morphine, and expressed concern about facial swelling since being admitted. Perfectserve sent to Bozena Harris NP to report concern for possible allergic reaction to morphine and to request a different pain medication. 0.25 mg dilaudid IV q4 PRN order placed. Pt reports she has tolerated dilaudid well in the past with no allergic reaction and that it provides her with pain relief.     Electronically signed by Adri Mcnulty RN on 7/25/24 at 10:48 PM EDT

## 2024-07-26 NOTE — CONSULTS
ECHO with bubble study is negative     Left Ventricle: Reduced left ventricular systolic function with a visually estimated EF of 60 - 65%.    Interatrial Septum: Agitated saline study was negative with and without provocation.    Image quality is adequate.    No clear etiology fr stroke   May consider an event monitor.    Drew Sandy MD, MPH

## 2024-07-26 NOTE — PROGRESS NOTES
Occupational Therapy  Facility/Department: [address] PROGRESSIVE CARE  Occupational Therapy Daily Treatment    Name: Jasmina Hahn  : 1980  MRN: [id number]  Date of Service: 2024    Discharge Recommendations:  Home with assist PRN, Home with Home health OT  OT Equipment Recommendations  Other: TBD by HHOT  Jasmina Hahn scored a 19/24 on the AM-PAC ADL Inpatient form. Current research shows that an AM-PAC score of 18 or greater is typically associated with a discharge to the patient's home setting. Based on the patient's AM-PAC score, and their current ADL deficits, it is recommended that the patient have 2-3 sessions per week of Occupational Therapy at d/c to increase the patient's independence.  At this time, this patient demonstrates the endurance and safety to discharge home with HHOT and a follow up treatment frequency of 2-3x/wk.   Please see assessment section for further patient specific details.    If patient discharges prior to next session this note will serve as a discharge summary.  Please see below for the latest assessment towards goals.       Patient Diagnosis(es): The primary encounter diagnosis was Acute right-sided weakness. Diagnoses of Right sided numbness, Nonintractable headache, unspecified chronicity pattern, unspecified headache type, Acute exacerbation of chronic low back pain, Cerebrovascular accident (CVA) due to embolism of left middle cerebral artery (HCC), and Cerebrovascular accident (CVA), unspecified mechanism (HCC) were also pertinent to this visit.  Past Medical History:  has a past medical history of Anemia, Chronic kidney disease, Diabetes mellitus (HCC), Hypertension, and Obesity.  Past Surgical History:  has a past surgical history that includes Cholecystectomy;  section (,,); Umbilical hernia repair; Tubal ligation (); Hand tendon surgery; Dialysis fistula creation; and Endoscopy, colon, diagnostic (2018).    Treatment

## 2024-07-26 NOTE — PLAN OF CARE
Problem: Discharge Planning  Goal: Discharge to home or other facility with appropriate resources  7/26/2024 1219 by Jenaro Hay RN  Outcome: Progressing     Problem: Pain  Goal: Verbalizes/displays adequate comfort level or baseline comfort level  7/26/2024 1219 by Jenaro Hay RN  Outcome: Progressing     Problem: Safety - Adult  Goal: Free from fall injury  7/26/2024 1219 by Jenaro Hay RN  Outcome: Progressing     Problem: ABCDS Injury Assessment  Goal: Absence of physical injury  7/26/2024 1219 by Jenaro Hay RN  Outcome: Progressing     Problem: Neurosensory - Adult  Goal: Achieves stable or improved neurological status  7/26/2024 1219 by Jenaro Hay RN  Outcome: Progressing     Problem: Neurosensory - Adult  Goal: Absence of seizures  7/26/2024 1219 by Jenaro Hay RN  Outcome: Progressing     Problem: Neurosensory - Adult  Goal: Remains free of injury related to seizures activity  7/26/2024 1219 by Jenaro Hay RN  Outcome: Progressing     Problem: Neurosensory - Adult  Goal: Achieves maximal functionality and self care  7/26/2024 1219 by Jenaro Hay RN  Outcome: Progressing

## 2024-07-26 NOTE — CARE COORDINATION
07/26/24 1530   Service Assessment   Patient Orientation Alert and Oriented   Cognition Alert   History Provided By Patient   Primary Caregiver Self   Accompanied By/Relationship n/a   Support Systems Spouse/Significant Other   Patient's Healthcare Decision Maker is: Legal Next of Kin   PCP Verified by CM Yes   Last Visit to PCP Within last 3 months   Prior Functional Level Independent in ADLs/IADLs   Current Functional Level Independent in ADLs/IADLs   Can patient return to prior living arrangement Yes   Ability to make needs known: Good   Family able to assist with home care needs: Yes  ( Ama)   Would you like for me to discuss the discharge plan with any other family members/significant others, and if so, who? No   Financial Resources None   Community Resources None   CM/SW Referral Other (see comment)  (discharge planning)   Discharge Planning   Type of Residence House   Living Arrangements Spouse/Significant Other   Current Services Prior To Admission Durable Medical Equipment   Current DME Prior to Arrival Shower Chair;Cane;Walker  (Patient does not use cane and walker at home but has equipment. Has Toilet riser at home and uses it)   Potential Assistance Needed Home Care   DME Ordered? No   Potential Assistance Purchasing Medications No   Type of Home Care Services None   Patient expects to be discharged to: House   One/Two Story Residence One story   History of falls? 0   Services At/After Discharge   Transition of Care Consult (CM Consult) Home Health   Internal Home Health No   Reason Outside Agency Chosen   (Unable to accept insurance)   Services At/After Discharge Home Health    Resource Information Provided? No   Mode of Transport at Discharge Other (see comment)  (car)   Confirm Follow Up Transport Family  ( to transport)   Condition of Participation: Discharge Planning   The Patient and/or Patient Representative was provided with a Choice of Provider? Patient   The Patient  (P) Durable Medical Equipment            Current DME: (P) Shower Chair, Cane, Walker (Patient does not use cane and walker at home but has equipment. Has Toilet riser at home and uses it)            Type of Home Care services:  (P) None    ADLS  Prior functional level: (P) Independent in ADLs/IADLs  Current functional level: (P) Independent in ADLs/IADLs    PT AM-PAC: 20 /24  OT AM-PAC: 19 /24    Family can provide assistance at DC: (P) Yes ( Ama)  Would you like Case Management to discuss the discharge plan with any other family members/significant others, and if so, who? (P) No  Plans to Return to Present Housing: (P) Yes  Other Identified Issues/Barriers to RETURNING to current housing: none  Potential Assistance needed at discharge: (P) Home Care            Potential DME:    Patient expects to discharge to: (P) House  Plan for transportation at discharge: (P) Family ( to transport)    Financial    Payor: [employer] / Plan: [employer] [place] / Product Type: *No Product type* /     Does insurance require precert for SNF: Yes    Potential assistance Purchasing Medications: (P) No  Meds-to-Beds request: Yes      [id number] [place], [place] - [address] [address] - P [phone] - F [phone]  [address] [address]  Phone: [phone] Fax: [phone]    [facility] [id number] [place], [place] - [address] - P [phone] - F [phone]  [address] [facility] [address]  Phone: [phone] Fax: [phone]    [id number] [place], [place] - [address] - P [phone] - F [phone]  [address]  [facility] [address]  Phone: [phone] Fax: [phone]      Notes:    Factors facilitating achievement of predicted outcomes: Family support, Motivated, Cooperative, and Pleasant    Barriers to discharge: Lower extremity weakness    Additional Case Management Notes: RN DEIDRA met with patient at bedside.

## 2024-07-26 NOTE — CARE COORDINATION
RN DEIDRA spoke with patient and she is good with [facility]. Explained that [facility] could not accept due to insurance. Patient happy with [facility].   Electronically signed by Millie Hunt RN on 7/26/2024 at 5:08 PM  [phone]

## 2024-07-26 NOTE — PLAN OF CARE
Problem: Discharge Planning  Goal: Discharge to home or other facility with appropriate resources  7/25/2024 2249 by Adri Mcnulty RN  Outcome: Progressing  7/25/2024 1705 by Lane Fortune RN  Outcome: Progressing     Problem: Pain  Goal: Verbalizes/displays adequate comfort level or baseline comfort level  7/25/2024 2249 by Adri Mcnulty RN  Outcome: Progressing  7/25/2024 1705 by Lane Fortune RN  Outcome: Progressing     Problem: Safety - Adult  Goal: Free from fall injury  7/25/2024 2249 by Adri Mcnulty RN  Outcome: Progressing  7/25/2024 1705 by Lane Fortune RN  Outcome: Progressing     Problem: ABCDS Injury Assessment  Goal: Absence of physical injury  7/25/2024 2249 by Adri Mcnulty RN  Outcome: Progressing  7/25/2024 1705 by Lane Fortune RN  Outcome: Progressing     Problem: Neurosensory - Adult  Goal: Achieves stable or improved neurological status  7/25/2024 2249 by Adri Mcnulty RN  Outcome: Progressing  7/25/2024 1705 by Lane Fortune RN  Outcome: Progressing  Goal: Absence of seizures  7/25/2024 2249 by Adri Mcnulty RN  Outcome: Progressing  7/25/2024 1705 by Lane Fortune RN  Outcome: Progressing  Goal: Remains free of injury related to seizures activity  7/25/2024 2249 by Adri Mcnulty RN  Outcome: Progressing  7/25/2024 1705 by Lane Fortune RN  Outcome: Progressing  Goal: Achieves maximal functionality and self care  7/25/2024 2249 by Adri Mcnulty RN  Outcome: Progressing  7/25/2024 1705 by Lane Fortune RN  Outcome: Progressing

## 2024-07-26 NOTE — PROGRESS NOTES
Physical Therapy  Facility/Department: [address] PROGRESSIVE CARE  Physical Therapy Treatment Session    Name: Jasmina Hahn  : 1980  MRN: [id number]  Date of Service: 2024    Discharge Recommendations:    Jasmina Hahn scored a 20/24 on the AM-PAC short mobility form. Current research shows that an AM-PAC score of 18 or greater is typically associated with a discharge to the patient's home setting. Based on the patient's AM-PAC score and their current functional mobility deficits, it is recommended that the patient have 2-3 sessions per week of Physical Therapy at d/c to increase the patient's independence.  At this time, this patient demonstrates the endurance and safety to discharge home with home health PT and a follow up treatment frequency of 2-3x/wk.  Please see assessment section for further patient specific details.    If patient discharges prior to next session this note will serve as a discharge summary.  Please see below for the latest assessment towards goals.        Patient would benefit from continued therapy after discharge, Home with Home health PT   PT Equipment Recommendations  Equipment Needed: No      Patient Diagnosis(es): The primary encounter diagnosis was Acute right-sided weakness. Diagnoses of Right sided numbness, Nonintractable headache, unspecified chronicity pattern, unspecified headache type, Acute exacerbation of chronic low back pain, Cerebrovascular accident (CVA) due to embolism of left middle cerebral artery (HCC), and Cerebrovascular accident (CVA), unspecified mechanism (HCC) were also pertinent to this visit.    Past Medical History:  has a past medical history of Anemia, Chronic kidney disease, Diabetes mellitus (HCC), Hypertension, and Obesity.    Past Surgical History:  has a past surgical history that includes Cholecystectomy;  section (,,); Umbilical hernia repair; Tubal ligation (); Hand tendon surgery; Dialysis fistula    Timed Code Treatment Minutes: 15 Minutes       Lane Bunch, KALYAN     Therapist was present, directed the patient's care, made skilled judgement, and was responsible for assessment and treatment of the patient.        Electronically signed by Mala Woodruff, JUDSON [id number] on 7/26/2024 at 5:21 PM

## 2024-07-26 NOTE — PROGRESS NOTES
Neurology Progress Note    Updates  No acute events overnight.   RUDEEPALI feels back to normal.   Still having RLE weakness.      Medical History:  Past Medical History:   Diagnosis Date    Anemia     Chronic kidney disease     Diabetes mellitus (HCC)     Hypertension     Obesity      Past Surgical History:   Procedure Laterality Date     SECTION  ,,    CHOLECYSTECTOMY      DIALYSIS FISTULA CREATION      ENDOSCOPY, COLON, DIAGNOSTIC  2018    Esophagogastroduodenoscopy with biopsy Colonoscopy with polypectomy (snare cautery)    HAND TENDON SURGERY      thumb-     TUBAL LIGATION      UMBILICAL HERNIA REPAIR       Scheduled Meds:   entecavir  0.5 mg Oral Daily    amLODIPine  10 mg Oral Daily    Vitamin D  4,000 Units Oral Daily    lisinopril  20 mg Oral Daily    citalopram  20 mg Oral Nightly    cyclobenzaprine  10 mg Oral Nightly    famotidine  20 mg Oral Daily    insulin glargine  20 Units SubCUTAneous BID    insulin lispro  15 Units SubCUTAneous TID WC    mycophenolate  1,000 mg Oral BID    pregabalin  25 mg Oral TID    tacrolimus  0.5 mg Oral QAM    tacrolimus  1 mg Oral QAM    tacrolimus  2 mg Oral QHS    insulin lispro  0-4 Units SubCUTAneous TID WC    insulin lispro  0-4 Units SubCUTAneous Nightly    apixaban  5 mg Oral BID    sodium chloride flush  5-40 mL IntraVENous 2 times per day    aspirin  81 mg Oral Daily    Or    aspirin  300 mg Rectal Daily     Medications Prior to Admission:   apixaban (ELIQUIS) 5 MG TABS tablet, Take 1 tablet by mouth 2 times daily  tacrolimus (PROGRAF) 1 MG capsule, Take 2 capsules by mouth nightly  tacrolimus (PROGRAF) 0.5 MG capsule, Take 1 capsule by mouth every morning Total dose 1.5mg  pregabalin (LYRICA) 25 MG capsule, Take 1 capsule by mouth 3 times daily. Max Daily Amount: 75 mg  insulin lispro (HUMALOG,ADMELOG) 100 UNIT/ML SOLN injection vial, Inject 15 Units into the skin 3 times daily (with meals) Plus sliding scale  cyclobenzaprine  dysphagia. No Dysarthria  Cardiovascular- No palpitations. No chest pain  Respiratory- No dyspnea. No Cough  Gastrointestinal- No Abdominal pain. No Vomiting.  Genitourinary- No incontinence. No urinary retention  Musculoskeletal- No myalgia. No arthralgia + pain  Skin- No rash. No easy bruising.  Psychiatric- No depression. No anxiety  Endocrine- No diabetes. No thyroid issues.  Hematologic- No bleeding difficulty. No fatigue  Neurologic- + weakness. No Headache.    Exam  Blood pressure (!) 141/100, pulse (!) 101, temperature 98.2 °F (36.8 °C), temperature source Oral, resp. rate 18, height 1.727 m (5' 8\"), weight (!) 137.9 kg (304 lb 0.2 oz), SpO2 99 %, peak flow (!) 2 L/min.  Constitutional    Vital signs: BP, HR, and RR reviewed   General alert, no distress  Eyes: unable to visualize the fundi  Psychiatric: cooperative with examination, no psychotic behavior noted.  Neurologic  Mental status:   orientation to person, place, time.     Attention intact as able to attend well to the exam     Language fluent in conversation   Comprehension intact; follows simple commands  Cranial nerves:   CN2: visual fields full   CN 3,4,6: extraocular muscles intact.  Pupils are equal, round, reactive bilaterally.   CN7: face symmetric without dysarthria  CN8: hearing grossly intact  CN11: trap full strength on shoulder shrug  CN12: tongue midline with protrusion  Strength: 4/5 RLE.  5/5 LUE/LLE/RUE.   Deep tendon reflexes: normal in all 4 extremities  Sensory: RLE diminished sensation.   Cerebellar/coordination: finger nose finger normal without ataxia  Tone: normal in all 4 extremities  Gait: deferred for safety.     Labs  Glucose 189  Na 138  K 4.0  BUN 12  Cr 0.9  Ca 8.9    LDL 88  HgA1c 7.2    ALT 19  AST 13    WBC 5.7K  Hg 11.7  Platelets 267    Studies  MRI cervical spine w/o 7/25/24, independently reviewed  IMPRESSION:  Normal cervical spine MRI.    MRI brain 7/25/24  IMPRESSION:  1. Questionable punctate subacute infarct

## 2024-07-26 NOTE — PROGRESS NOTES
Pt had no acute events throughout the night, VSS. Pt continues to have pain and decreased sensation on her right side. Pt is currently resting comfortably in bed in a supine position, normal sinus rhythm on monitor, no distress noted.     Electronically signed by Adri Mcnulty RN on 7/26/24 at 6:20 AM EDT

## 2024-07-26 NOTE — DISCHARGE INSTR - COC
Continuity of Care Form    Patient Name: Jasmina Hahn   :  1980  MRN:  [id number]    Admit date:  2024  Discharge date:  ***    Code Status Order: Full Code   Advance Directives:     Admitting Physician:  Tavares Kumar MD  PCP: Cassie Harman MD    Discharging Nurse: ***  Discharging Hospital Unit/Room#: T6Z-0411/5270-01  Discharging Unit Phone Number: ***    Emergency Contact:   Extended Emergency Contact Information  Primary Emergency Contact: Ama Hahn  Address: [address] [place] [address]  Home Phone: [phone]  Relation: Spouse  Secondary Emergency Contact: Aryan,April  Home Phone: [phone]  Relation: Other    Past Surgical History:  Past Surgical History:   Procedure Laterality Date     SECTION  ,,    CHOLECYSTECTOMY      DIALYSIS FISTULA CREATION      ENDOSCOPY, COLON, DIAGNOSTIC  2018    Esophagogastroduodenoscopy with biopsy Colonoscopy with polypectomy (snare cautery)    HAND TENDON SURGERY      thumb-     TUBAL LIGATION      UMBILICAL HERNIA REPAIR         Immunization History:   Immunization History   Administered Date(s) Administered    COVID-19, MODERNA Bivalent, (age 12y+), IM, 50 mcg/0.5 mL 10/27/2022, 05/10/2023    COVID-19, PFIZER PURPLE top, DILUTE for use, (age 12 y+), 30mcg/0.3mL 2021, 2021, 2021       Active Problems:  Patient Active Problem List   Diagnosis Code    Obstructive uropathy N13.9    Abdominal pain R10.9    Morbid obesity due to excess calories ([facility]) E66.01    HTN (hypertension) I10    Stroke-like symptoms R29.90    Type 2 diabetes mellitus with retinopathy, with long-term current use of insulin ([facility]) E11.319, Z79.4    Kidney transplanted Z94.0    Acute CVA (cerebrovascular accident) ([facility]) I63.9       Isolation/Infection:   Isolation            No Isolation          Patient Infection Status       None to display            Nurse Assessment:  Last  only, NOT a DME order):  {EQUIPMENT:981301849}  Other Treatments: ***    Patient's personal belongings (please select all that are sent with patient):  {CHP DME Belongings:696082249}    RN SIGNATURE:  {Esignature:432796652}    CASE MANAGEMENT/SOCIAL WORK SECTION    Inpatient Status Date: 7/25/2024    Readmission Risk Assessment Score:  Readmission Risk              Risk of Unplanned Readmission:  15           Discharging to Facility/ Agency   Discharging to Facility/ Agency   Name: [facility]  Address:   [address] [place] [address]  Phone:  [phone]  Fax:  [phone]    / signature: Electronically signed by Millie Hunt RN on 7/26/24 at 5:03 PM EDT    PHYSICIAN SECTION    Prognosis: {Prognosis:4923345398}    Condition at Discharge: { Patient Condition:436792495}    Rehab Potential (if transferring to Rehab): {Prognosis:3609649607}    Recommended Labs or Other Treatments After Discharge: ***    Physician Certification: I certify the above information and transfer of Jasmina Hahn  is necessary for the continuing treatment of the diagnosis listed and that she requires {Admit to Appropriate Level of Care:83904} for {GREATER/LESS:577194819} 30 days.     Update Admission H&P: {CHP DME Changes in HandP:362750105}    PHYSICIAN SIGNATURE:  {Esignature:434699747}

## 2024-07-26 NOTE — PROGRESS NOTES
Comprehensive Nutrition Assessment    Type and Reason for Visit:  Initial, Positive Nutrition Screen (decreased appetite)    Nutrition Recommendations/Plan:   Continue Carb control diet  Offer Ensure high protein (low calorie, high protein) with meals  Will monitor nutritional adequacy, nutrition-related labs, weights, BMs, and clinical progress      Malnutrition Assessment:  Malnutrition Status:  No malnutrition (07/26/24 1522)    Context:  Acute Illness     Findings of the 6 clinical characteristics of malnutrition:  Energy Intake:   (decreased appetite reported prior to admission, unsure length of time)  Weight Loss:  No significant weight loss     Body Fat Loss:  No significant body fat loss     Muscle Mass Loss:  No significant muscle mass loss    Fluid Accumulation:  Unable to assess     Strength:  Not Performed    Nutrition Assessment:    Patient admitted with stroke like symptoms.  History of diabetes, chronic kidney disease, and HTN.   Currently on a carb control diet.  Patient reported not having any questions about diabetic nutrition at this time. Nutrition risk triggered due to decreased appetite prior to admission.   Appetite improving and swallowing better per patient.  Patient declined need for diet education but agreed to Ensure high protein (low calorie, high protein).   Patient reported drinking [employer] Protein shakes at home.  Patient also reported Hgb A1C improving from 10% to 7%, which confirmed in EMR in the past year.    Nutrition Related Findings:    last BM on 7/24; no pork per cultural preferences Wound Type: None       Current Nutrition Intake & Therapies:    Average Meal Intake: 51-75%     ADULT DIET; Regular; 5 carb choices (75 gm/meal)  ADULT ORAL NUTRITION SUPPLEMENT; Breakfast, Dinner; Low Calorie/High Protein Oral Supplement    Anthropometric Measures:  Height: 172.7 cm (5' 8\")  Ideal Body Weight (IBW): 140 lbs (64 kg)       Current Body Weight: 131 kg (288 lb 12.8 oz),   IBW.

## 2024-07-26 NOTE — PROGRESS NOTES
V2.0    [facility] [place]      Name:  Jasmina Hahn /Age/Sex: 1980  (44 y.o. female)   MRN & CSN:  [id number] & [id number] Encounter Date/Time: 2024 8:48 AM EDT   Location:  K6H-1685/5270-01 PCP: Cassie Harman MD     Attending:Tavares Kumar MD       Hospital Day: 3    Assessment and Recommendations   Jasmina Hahn is a 44 y.o. female who presents with Stroke-like symptoms      Plan:   Possible stroke, initially was started on aspirin on admission, MRI positive for possible punctuate infarct also with chronic vascular disease possibly watershed discussed with cardiology and bubble study ordered, echo resulted as reduced left ventricular systolic function with visually estimated at 60-65, and agitated saline study was negative with and without provocation. neurology consulted and okay to restart Eliquis restarted yesterday  Neck and lower back pain with right-sided weakness MRI cervical spine ordered per neurology, MRI reviewed and resulted as normal cervical spine MRI  Morbid obesity complicating current presentation increasing risk for morbidity mortality counseled for weight loss  Essential hypertension restarting p.o. medications  Recent DVT and pulmonary embolism, Eliquis restarted  S/p renal transplant  Diabetes mellitus sliding scale, diabetic diet  Reported obstructive cardiomyopathy, discussed with Dr. Sandy appears dynamic obstruction no further intervention or evaluation per Dr. Sandy needs to follow-up with cardiology as outpatien    Diet ADULT DIET; Regular; 5 carb choices (75 gm/meal)   DVT Prophylaxis [] Lovenox, []  Heparin, [] SCDs, [] Ambulation,  [] Eliquis, [] Xarelto  [] Coumadin   Code Status Full Code             Personally reviewed Lab Studies and Imaging     Discussed management of the case with neurology    Telemetry strip reviewed by myself no ST elevation        Drugs that require monitoring for toxicity include Eliquis and the method of monitoring was

## 2024-07-26 NOTE — PROGRESS NOTES
Physician Progress Note      PATIENT:               VERONA ERIC  CSN #:                  [id number]  :                       1980  ADMIT DATE:       2024 7:10 PM  DISCH DATE:  RESPONDING  PROVIDER #:        Tavarse Montelongo MD          QUERY TEXT:    Patient admitted with possible CVA, noted to have CKD. If possible, please   document in progress notes and discharge summary if you are evaluating and/or   treating any of the following:    The medical record reflects the following:  Risk Factors: Hx- Eliquis for history of saddle pulmonary emboli, kidney   transplant, HTN, anemia, CKD, DM, Obesity  Clinical Indicators: CRT 1.0, GFR 72.  Treatment: Monitor    Thank You,  Gladys Yang RN BSN CDS CRCR  [email or web address]  Options provided:  -- CKD Stage 1 GFR>90  -- CKD Stage 2 GFR 60-90  -- CKD Stage 3a GFR 45-59  -- CKD Stage 3b GFR 30-44  -- CKD Stage 4 GFR 15-29  -- CKD Stage 5 GFR<15  -- ESRD  -- Other - I will add my own diagnosis  -- Disagree - Not applicable / Not valid  -- Disagree - Clinically unable to determine / Unknown  -- Refer to Clinical Documentation Reviewer    PROVIDER RESPONSE TEXT:    This patient has CKD Stage 1.    Query created by: Gladys Yang on 2024 1:04 PM      Electronically signed by:  Tavares Montelongo MD 2024 5:37 PM

## 2024-07-27 VITALS
DIASTOLIC BLOOD PRESSURE: 88 MMHG | RESPIRATION RATE: 19 BRPM | WEIGHT: 293 LBS | OXYGEN SATURATION: 98 % | TEMPERATURE: 98.8 F | HEART RATE: 106 BPM | BODY MASS INDEX: 44.41 KG/M2 | SYSTOLIC BLOOD PRESSURE: 152 MMHG | HEIGHT: 68 IN

## 2024-07-27 LAB
GLUCOSE BLD-MCNC: 127 MG/DL (ref 70–99)
GLUCOSE BLD-MCNC: 162 MG/DL (ref 70–99)
PERFORMED ON: ABNORMAL
PERFORMED ON: ABNORMAL

## 2024-07-27 PROCEDURE — 6370000000 HC RX 637 (ALT 250 FOR IP): Performed by: HOSPITALIST

## 2024-07-27 PROCEDURE — 6360000002 HC RX W HCPCS: Performed by: HOSPITALIST

## 2024-07-27 PROCEDURE — 94760 N-INVAS EAR/PLS OXIMETRY 1: CPT

## 2024-07-27 PROCEDURE — 6370000000 HC RX 637 (ALT 250 FOR IP): Performed by: INTERNAL MEDICINE

## 2024-07-27 PROCEDURE — 2580000003 HC RX 258: Performed by: HOSPITALIST

## 2024-07-27 PROCEDURE — 6360000002 HC RX W HCPCS: Performed by: REGISTERED NURSE

## 2024-07-27 PROCEDURE — 6360000002 HC RX W HCPCS: Performed by: INTERNAL MEDICINE

## 2024-07-27 RX ADMIN — LISINOPRIL 20 MG: 20 TABLET ORAL at 09:13

## 2024-07-27 RX ADMIN — AMLODIPINE BESYLATE 10 MG: 10 TABLET ORAL at 09:14

## 2024-07-27 RX ADMIN — PREGABALIN 25 MG: 25 CAPSULE ORAL at 13:01

## 2024-07-27 RX ADMIN — Medication 4000 UNITS: at 09:14

## 2024-07-27 RX ADMIN — HYDROMORPHONE HYDROCHLORIDE 0.25 MG: 1 INJECTION, SOLUTION INTRAMUSCULAR; INTRAVENOUS; SUBCUTANEOUS at 09:15

## 2024-07-27 RX ADMIN — INSULIN LISPRO 15 UNITS: 100 INJECTION, SOLUTION INTRAVENOUS; SUBCUTANEOUS at 13:00

## 2024-07-27 RX ADMIN — ASPIRIN 81 MG 81 MG: 81 TABLET ORAL at 09:14

## 2024-07-27 RX ADMIN — ENTECAVIR 0.5 MG: 0.5 TABLET, FILM COATED ORAL at 09:52

## 2024-07-27 RX ADMIN — FAMOTIDINE 20 MG: 20 TABLET, FILM COATED ORAL at 09:14

## 2024-07-27 RX ADMIN — INSULIN LISPRO 15 UNITS: 100 INJECTION, SOLUTION INTRAVENOUS; SUBCUTANEOUS at 09:15

## 2024-07-27 RX ADMIN — APIXABAN 5 MG: 5 TABLET, FILM COATED ORAL at 09:13

## 2024-07-27 RX ADMIN — INSULIN GLARGINE 20 UNITS: 100 INJECTION, SOLUTION SUBCUTANEOUS at 09:14

## 2024-07-27 RX ADMIN — TACROLIMUS 0.5 MG: 0.5 CAPSULE ORAL at 13:00

## 2024-07-27 RX ADMIN — PREGABALIN 25 MG: 25 CAPSULE ORAL at 09:13

## 2024-07-27 RX ADMIN — MYCOPHENOLATE MOFETIL 1000 MG: 250 CAPSULE ORAL at 09:52

## 2024-07-27 RX ADMIN — ONDANSETRON 4 MG: 2 INJECTION INTRAMUSCULAR; INTRAVENOUS at 09:52

## 2024-07-27 RX ADMIN — SODIUM CHLORIDE, PRESERVATIVE FREE 10 ML: 5 INJECTION INTRAVENOUS at 09:16

## 2024-07-27 RX ADMIN — TACROLIMUS 1 MG: 1 CAPSULE ORAL at 09:52

## 2024-07-27 RX ADMIN — HYDROMORPHONE HYDROCHLORIDE 0.25 MG: 1 INJECTION, SOLUTION INTRAMUSCULAR; INTRAVENOUS; SUBCUTANEOUS at 03:38

## 2024-07-27 ASSESSMENT — PAIN DESCRIPTION - ORIENTATION
ORIENTATION: RIGHT
ORIENTATION: RIGHT

## 2024-07-27 ASSESSMENT — PAIN SCALES - WONG BAKER
WONGBAKER_NUMERICALRESPONSE: NO HURT
WONGBAKER_NUMERICALRESPONSE: HURTS WHOLE LOT

## 2024-07-27 ASSESSMENT — PAIN SCALES - GENERAL
PAINLEVEL_OUTOF10: 7
PAINLEVEL_OUTOF10: 8

## 2024-07-27 ASSESSMENT — PAIN DESCRIPTION - LOCATION
LOCATION: BACK
LOCATION: BACK
LOCATION: BACK;LEG;HIP

## 2024-07-27 ASSESSMENT — PAIN DESCRIPTION - DESCRIPTORS
DESCRIPTORS: ACHING
DESCRIPTORS: SHARP

## 2024-07-27 NOTE — PROGRESS NOTES
0902: Head to toe assessment completed. VSS. Patient A&Ox4. Breathing even and unlarboed on room air.pt NIH 2. Pt asked about neurosurgery consult, perfect serve was sent to Dr. Hope about the matter. All needs met at this time. Standard safety precautions in place. Care on going.   Electronically signed by Marcial Ponce RN on 7/27/2024 at 9:40 AM     0947 patient had one episode of emesis , prn zofran given.  Electronically signed by Marcial Ponce RN on 7/27/2024 at 10:09 AM

## 2024-07-27 NOTE — PROGRESS NOTES
NAME:  Jasmina Hahn  YOB: 1980  MEDICAL RECORD NUMBER:  [id number]  TODAYS DATE:  7/27/2024    Discussed personal risk factors for Stroke/TIA with patient/family, and ways to reduce the risk for a recurrent stroke. Patient's personal risk factors which were identified are:     [] Alcohol Abuse: check with your physician before any alcohol consumption.   [] Atrial fibrillation: may cause blood clots.  [] Drug Abuse: Seek help, talk with your doctor  [] Clotting Disorder  [] Diabetes  [] Family history of stroke or heart disease  [x] High Blood Pressure/Hypertension: work with your physician.  [] High cholesterol: monitor cholesterol levels with your physician.   [x] Overweight/Obesity: work with your physician for your ideal body weight.  [] Physical Inactivity: get regular exercise as directed by your physician.   [] Personal history of previous TIA or stroke  [] Poor Diet; decrease salt (sodium) in your diet, follow diet directed by physician.   [] Smoking: Cigarette/Cigar: stop smoking.         Advised pt. that you can reduce your risk for stroke/TIA by modifying/controlling the risk factors that you have. Pt.advised to take the medications as prescribed, which will be detailed in the discharge instructions, and to not stop taking them without consulting their physician. In addition, pt. advised to maintain a healthy diet, exercise regularly and to not smoke.      [facility] Stroke treatment and prevention, Managing your recovery  notebook  provided and/or reviewed  with patient/family.  The notebook includes, but not limited to, sections addressing warning signs & symptoms of a stroke, which are: sudden numbness or weakness especially on one side of the body, sudden confusion, difficulty speaking or understanding, sudden changes in vision, sudden dizziness or loss of balance/ coordination, sudden severe headache, syncope and seizure.  The need to call EMS (911) immediately if signs &

## 2024-07-27 NOTE — PLAN OF CARE
Problem: Discharge Planning  Goal: Discharge to home or other facility with appropriate resources  7/26/2024 2136 by Adri Mcnulty RN  Outcome: Progressing  7/26/2024 1219 by Jenaro Hay RN  Outcome: Progressing     Problem: Pain  Goal: Verbalizes/displays adequate comfort level or baseline comfort level  7/26/2024 2136 by Adri Mcnulty RN  Outcome: Progressing  7/26/2024 1219 by Jenaro Hay RN  Outcome: Progressing     Problem: Safety - Adult  Goal: Free from fall injury  7/26/2024 2136 by Adri Mcnulty RN  Outcome: Progressing  7/26/2024 1219 by Jenaro Hay RN  Outcome: Progressing     Problem: ABCDS Injury Assessment  Goal: Absence of physical injury  7/26/2024 2136 by Adri Mcnulty RN  Outcome: Progressing  7/26/2024 1219 by Jenaro Hay RN  Outcome: Progressing     Problem: Neurosensory - Adult  Goal: Achieves stable or improved neurological status  7/26/2024 2136 by Adri Mcnulty RN  Outcome: Progressing  7/26/2024 1219 by Jenaro Hay RN  Outcome: Progressing  Goal: Absence of seizures  7/26/2024 2136 by Adri Mcnulty RN  Outcome: Progressing  7/26/2024 1219 by Jenaro Hay RN  Outcome: Progressing  Goal: Remains free of injury related to seizures activity  7/26/2024 2136 by Adri Mcnulty RN  Outcome: Progressing  7/26/2024 1219 by Jenaro Hay RN  Outcome: Progressing  Goal: Achieves maximal functionality and self care  7/26/2024 2136 by Adri Mcnulty RN  Outcome: Progressing  7/26/2024 1219 by Jenaro Hay RN  Outcome: Progressing     Problem: Chronic Conditions and Co-morbidities  Goal: Patient's chronic conditions and co-morbidity symptoms are monitored and maintained or improved  Outcome: Progressing     Problem: Nutrition Deficit:  Goal: Optimize nutritional status  Outcome: Progressing

## 2024-07-27 NOTE — PROGRESS NOTES
scattered T2/FLAIR hyperintense foci throughout the subcortical white matter, favored to reflect prominent perivascular spaces.  A  component early mild chronic small vessel disease is possible. Additional area of T2/FLAIR hyperintensity within the left frontal deep white matter in a vertical orientation suggesting remote internal watershed infarcts.    Noncontrast CT head (7/24) No acute intracranial abnormality.    CTA head / neck (7/24) No acute abnormality in the large arteries of the head and neck.  No LVO or dissection.    Portable CXR (7/24) No acute cardiopulmonary abnormality is identified.     MRI lumbar spine (5/23/24) Multilevel lumbar spondylosis and facet arthropathy, not significantly changed from 5/13/2044. 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 Moderate spinal canal stenosis at L4-L5 and L5-S1. There is bilateral subarticular zone narrowing at both levels, with disc material contacting but not clearly compressing the descending L5 and S1 nerve roots, respectively.  Severe left L5 foraminal stenosis. Moderate to severe right L4 foraminal stenosis. Other areas of lesser foraminal stenosis as detailed.     MRI cervical spine (5/22/24) Mild cervical spondylosis with mild C3-4 and C5-6 spinal canal stenosis. No significant neural foraminal narrowing.     TTE with bubble study (7/25)  Left Ventricle: Reduced left ventricular systolic function with a visually estimated EF of 60 - 65%.  Interatrial Septum: Agitated saline study was negative with and without provocation.     Objective:   Vitals:  /97   Pulse 107   Temp 97.2 °F (oral)   Resp 17   Ht 5' 8\"  Wt 134.1 kg  SpO2 98% on RA  BMI 44.95 kg/m²   General appearance: alert and cooperative with exam  Lungs: clear to auscultation bilaterally  Heart: regular rate and rhythm, S1, S2 normal, no murmur, click, rub or gallop  Abdomen: soft, non-tender; bowel sounds normal; no masses,  no organomegaly  Extremities:  extremities normal, atraumatic, no cyanosis or edema  Neurologic: No obvious focal neurologic deficits.    Assessment and Plan:   Possible stroke, initiated on aspirin and high intensity statin.   Questionable punctate subacute infarct within the left frontal lobe by MRI.  Echo with bubble study negative per cardiology (Dr Sandy).  Continues on aspirin and apixaban per neurology.    Neck and lower back pain with right-sided weakness  Normal MRI cervical spine.  History of mild C3-4 and C5-6 spinal canal stenosis with no significant neural foraminal narrowing on MRI (5/22/24) at [facility].  Neck discomfort probably musculoskeletal per neurology.    Lumbar spine imaging was abnormal in May.  Consider neurosurgery evaluation as outpatient per neurology.   Type 2 Diabetes (HgbA1c 7.2%).  Basal glargine insulin 20 units BID.  Cover with a \"sliding scale\" lispro moderate scale prandial correction insulin.    Recent pulmonary embolus and DVT (June 2024) on apixaban 5 mg BID.   Primary hypertension.   History of kidney transplant on mycophenolate and tacrolimus  CKD, stage 1.    Class III obesity (BMI 44.9).     Advance Directive: Full Code  DVT prophylaxis with apixaban 5 mg BID (h/o PE)  Discharge planning: AM-PAC 20/24 by PT and 19/24 by OT.   Current with [facility].        DILMA BRAND MD  [facility] Hospitalist

## 2024-07-27 NOTE — PLAN OF CARE
Problem: Discharge Planning  Goal: Discharge to home or other facility with appropriate resources  7/27/2024 0940 by Marcial Ponce RN  Outcome: Progressing  7/26/2024 2136 by Adri Mcnulty RN  Outcome: Progressing     Problem: Pain  Goal: Verbalizes/displays adequate comfort level or baseline comfort level  7/27/2024 0940 by Marcial Ponce RN  Outcome: Progressing  7/26/2024 2136 by Adri Mcnulty RN  Outcome: Progressing     Problem: Safety - Adult  Goal: Free from fall injury  7/27/2024 0940 by Marcial Ponce RN  Outcome: Progressing  7/26/2024 2136 by Adri Mcnulty RN  Outcome: Progressing     Problem: ABCDS Injury Assessment  Goal: Absence of physical injury  7/27/2024 0940 by Marcial Ponce RN  Outcome: Progressing  7/26/2024 2136 by Adri Mcnulty RN  Outcome: Progressing     Problem: Neurosensory - Adult  Goal: Achieves stable or improved neurological status  7/27/2024 0940 by Marcial Ponce RN  Outcome: Progressing  7/26/2024 2136 by Adri Mcnulty RN  Outcome: Progressing  Goal: Absence of seizures  7/27/2024 0940 by Marcial Ponce RN  Outcome: Progressing  7/26/2024 2136 by Adri Mcnulty RN  Outcome: Progressing  Goal: Remains free of injury related to seizures activity  7/27/2024 0940 by Marcial Ponce RN  Outcome: Progressing  7/26/2024 2136 by Adri Mcnulty RN  Outcome: Progressing  Goal: Achieves maximal functionality and self care  7/27/2024 0940 by Marcial Ponce RN  Outcome: Progressing  7/26/2024 2136 by Adri Mcnulty RN  Outcome: Progressing     Problem: Chronic Conditions and Co-morbidities  Goal: Patient's chronic conditions and co-morbidity symptoms are monitored and maintained or improved  7/27/2024 0940 by Marcial Ponce RN  Outcome: Progressing  7/26/2024 2136 by Adri Mcnulty RN  Outcome: Progressing     Problem: Nutrition Deficit:  Goal: Optimize nutritional status  7/27/2024 0940 by Marcial Ponce RN  Outcome: Progressing  7/26/2024 2136 by Adri Mcnulty

## 2024-07-27 NOTE — DISCHARGE SUMMARY
Hospital Medicine Discharge Summary    Jasmina Hahn  :  1980  MRN:  [id number]    Admit date:  2024  Discharge date:  2024    Admitting Physician:  Tavares Kumar MD  Primary Care Physician:  Cassie Harman MD  Code Status:   Full Code  Status: Inpatient  Discharged Condition: Stable  Activity: activity as tolerated  Diet:  controlled carbohydrate (75 gm/meal)     Discharge Diagnoses:      Stroke-like symptoms    Neck and lower back pain    Type 2 Diabetes    Pulmonary embolus    Primary hypertension    History of kidney transplant on immunosuppression    CKD, stage 1    Class III obesity (BMI 44.9)      Hospital Course:   44 y.o. female admitted with acute neck pain with subsequent development of right upper and lower extremity weakness with RUE sensory deficit.  RUE symptoms have improved though she continues to have weakness in her RLE.  Anticoagulation with apixaban due to recent pulmonary embolus.     Possible stroke, initiated on aspirin and high intensity statin.   Questionable punctate subacute infarct within the left frontal lobe by MRI.  Echo with bubble study negative per cardiology (Dr Sandy).  Continues on aspirin and apixaban per neurology.    Neck and lower back pain with right-sided weakness  Normal MRI cervical spine.  History of mild C3-4 and C5-6 spinal canal stenosis with no significant neural foraminal narrowing on MRI (24) at [facility].  Neck discomfort probably musculoskeletal per neurology.    Lumbar spine imaging was abnormal in May.  Consider neurosurgery evaluation as outpatient per neurology.   Type 2 Diabetes (HgbA1c 7.2%).  Basal glargine insulin 20 units BID.  Cover with a \"sliding scale\" lispro moderate scale prandial correction insulin.    Recent pulmonary embolus and DVT (2024) on apixaban 5 mg BID.   Primary hypertension.   History of kidney transplant on mycophenolate and tacrolimus  CKD, stage 1.    Class III obesity (BMI 44.9).      Consults:  Cardiology, Neurology      Significant Diagnostic Studies:    MRI cervical spine (7/25) Normal cervical spine MRI.   MRI brain (7/24)  Questionable punctate subacute infarct within the left frontal lobe, although possibly artifact.  No acute intracranial hemorrhage.  Few scattered T2/FLAIR hyperintense foci throughout the subcortical white matter, favored to reflect prominent perivascular spaces.  A  component early mild chronic small vessel disease is possible. Additional area of T2/FLAIR hyperintensity within the left frontal deep white matter in a vertical orientation suggesting remote internal watershed infarcts.  Noncontrast CT head (7/24) No acute intracranial abnormality.  CTA head / neck (7/24) No acute abnormality in the large arteries of the head and neck.  No LVO or dissection.  Portable CXR (7/24) No acute cardiopulmonary abnormality is identified.   TTE with bubble study (7/25)  Left Ventricle: Reduced left ventricular systolic function with a visually estimated EF of 60 - 65%.  Interatrial Septum: Agitated saline study was negative with and without provocation.     Treatments:   observation, telemetry    Disposition:   home with self care  Follow up with Cassie Harman MD in 1-2 weeks.  Current with [facility].       Signed:  DILMA BRAND MD  7/27/2024, 5:56 PM

## 2024-07-27 NOTE — PLAN OF CARE
Problem: Discharge Planning  Goal: Discharge to home or other facility with appropriate resources  7/27/2024 1701 by Marcial Ponce RN  Outcome: Completed  7/27/2024 0940 by Marcial Ponce RN  Outcome: Progressing     Problem: Pain  Goal: Verbalizes/displays adequate comfort level or baseline comfort level  7/27/2024 1701 by Marcial Ponce RN  Outcome: Completed  7/27/2024 0940 by Marcial Ponce RN  Outcome: Progressing     Problem: Safety - Adult  Goal: Free from fall injury  7/27/2024 1701 by Marcial Ponce RN  Outcome: Completed  7/27/2024 0940 by Marcial Ponce RN  Outcome: Progressing     Problem: ABCDS Injury Assessment  Goal: Absence of physical injury  7/27/2024 1701 by Marcial Ponce RN  Outcome: Completed  7/27/2024 0940 by Marcial Ponce RN  Outcome: Progressing     Problem: Neurosensory - Adult  Goal: Achieves stable or improved neurological status  7/27/2024 1701 by Marcial Ponce RN  Outcome: Completed  7/27/2024 0940 by Marcial Ponce RN  Outcome: Progressing  Goal: Absence of seizures  7/27/2024 1701 by Marcial Ponce RN  Outcome: Completed  7/27/2024 0940 by Marcial Ponce RN  Outcome: Progressing  Goal: Remains free of injury related to seizures activity  7/27/2024 1701 by Marcial Ponce RN  Outcome: Completed  7/27/2024 0940 by Marcial Ponce RN  Outcome: Progressing  Goal: Achieves maximal functionality and self care  7/27/2024 1701 by Marcial Ponce RN  Outcome: Completed  7/27/2024 0940 by Marcial Ponce RN  Outcome: Progressing     Problem: Chronic Conditions and Co-morbidities  Goal: Patient's chronic conditions and co-morbidity symptoms are monitored and maintained or improved  7/27/2024 1701 by Marcial Ponce RN  Outcome: Completed  7/27/2024 0940 by Marcial Ponce RN  Outcome: Progressing     Problem: Nutrition Deficit:  Goal: Optimize nutritional status  7/27/2024 1701 by Marcial Ponce RN  Outcome: Completed  7/27/2024 0940 by Marcial Ponce RN  Outcome:  Progressing

## 2024-07-27 NOTE — PROGRESS NOTES
Discharge orders acknowledged by RN . Patient declined discharge teaching. Pt removed IV . Patient removed portable telemetry monitor .Pt discharged with all belongings. Pt transported off of unit via wheelchair by family.    Electronically signed by Marcial Ponce RN on 7/27/2024 at 4:59 PM

## 2024-08-12 ENCOUNTER — OFFICE VISIT (OUTPATIENT)
Age: 44
End: 2024-08-12

## 2024-08-12 VITALS
RESPIRATION RATE: 18 BRPM | HEIGHT: 68 IN | DIASTOLIC BLOOD PRESSURE: 98 MMHG | OXYGEN SATURATION: 96 % | WEIGHT: 284 LBS | HEART RATE: 94 BPM | BODY MASS INDEX: 43.04 KG/M2 | SYSTOLIC BLOOD PRESSURE: 153 MMHG | TEMPERATURE: 98.4 F

## 2024-08-12 DIAGNOSIS — B37.0 CANDIDA INFECTION, ORAL: Primary | ICD-10-CM

## 2024-08-12 DIAGNOSIS — J02.9 SORE THROAT: ICD-10-CM

## 2024-08-12 LAB — STREPTOCOCCUS A RNA: NEGATIVE

## 2024-08-12 RX ORDER — ONDANSETRON 4 MG/1
4 TABLET, FILM COATED ORAL 3 TIMES DAILY PRN
Qty: 15 TABLET | Refills: 0 | Status: SHIPPED | OUTPATIENT
Start: 2024-08-12 | End: 2024-08-17

## 2024-08-12 NOTE — PATIENT INSTRUCTIONS
Jasmina,    It was an absolute pleasure taking care of you today.  I'm hoping you'll start feeling better as soon as possible. Please call me if you have any questions or concerns about what we talked about today.  Feel free stop back in if anything changes or things seem to be getting worse.  If for some reason you develop any serious or potentially life-threatening issues, call 911 or proceed to the nearest emergency department.  Hopefully it will never come to that.  Otherwise, it was very nice to meet you Jasmina.      Thanks,     Glenn Camilo

## 2024-08-12 NOTE — PROGRESS NOTES
normal.   Abdominal:      Tenderness: There is no guarding.   Musculoskeletal:      Cervical back: Neck supple.   Skin:     General: Skin is warm.   Neurological:      Mental Status: She is alert and oriented to person, place, and time.   Psychiatric:         Mood and Affect: Mood normal.         Behavior: Behavior normal.       An electronic signature was used to authenticate this note.    Bishop Camilo, APRN - CNP

## 2024-10-01 ENCOUNTER — HOSPITAL ENCOUNTER (EMERGENCY)
Age: 44
Discharge: ANOTHER HOSPITAL | End: 2024-10-01
Attending: EMERGENCY MEDICINE
Payer: COMMERCIAL

## 2024-10-01 ENCOUNTER — HOSPITAL ENCOUNTER (INPATIENT)
Age: 44
LOS: 6 days | Discharge: HOME | DRG: 552 | End: 2024-10-07
Payer: COMMERCIAL

## 2024-10-01 ENCOUNTER — APPOINTMENT (OUTPATIENT)
Dept: MRI IMAGING | Age: 44
End: 2024-10-01
Payer: COMMERCIAL

## 2024-10-01 VITALS
HEART RATE: 119 BPM | HEIGHT: 69 IN | BODY MASS INDEX: 43.4 KG/M2 | DIASTOLIC BLOOD PRESSURE: 90 MMHG | SYSTOLIC BLOOD PRESSURE: 146 MMHG | RESPIRATION RATE: 27 BRPM | TEMPERATURE: 98.7 F | WEIGHT: 293 LBS | OXYGEN SATURATION: 98 %

## 2024-10-01 DIAGNOSIS — M51.26 HNP (HERNIATED NUCLEUS PULPOSUS), LUMBAR: ICD-10-CM

## 2024-10-01 DIAGNOSIS — I82.4Y1 DEEP VEIN THROMBOSIS (DVT) OF PROXIMAL VEIN OF RIGHT LOWER EXTREMITY, UNSPECIFIED CHRONICITY (HCC): Primary | ICD-10-CM

## 2024-10-01 DIAGNOSIS — M54.41 ACUTE BILATERAL LOW BACK PAIN WITH RIGHT-SIDED SCIATICA: Primary | ICD-10-CM

## 2024-10-01 DIAGNOSIS — M54.16 LUMBAR RADICULOPATHY: ICD-10-CM

## 2024-10-01 DIAGNOSIS — R93.7 ABNORMAL MRI, LUMBAR SPINE: ICD-10-CM

## 2024-10-01 LAB
ANION GAP SERPL CALCULATED.3IONS-SCNC: 11 MMOL/L (ref 3–16)
BACTERIA URNS QL MICRO: ABNORMAL /HPF
BASOPHILS # BLD: 0 K/UL (ref 0–0.2)
BASOPHILS NFR BLD: 0.5 %
BILIRUB UR QL STRIP.AUTO: NEGATIVE
BUN SERPL-MCNC: 12 MG/DL (ref 7–20)
CALCIUM SERPL-MCNC: 9.8 MG/DL (ref 8.3–10.6)
CHLORIDE SERPL-SCNC: 102 MMOL/L (ref 99–110)
CLARITY UR: CLEAR
CO2 SERPL-SCNC: 25 MMOL/L (ref 21–32)
COLOR UR: YELLOW
CREAT SERPL-MCNC: 0.8 MG/DL (ref 0.6–1.1)
CRP SERPL-MCNC: 8.6 MG/L (ref 0–5.1)
DEPRECATED RDW RBC AUTO: 14.7 % (ref 12.4–15.4)
EOSINOPHIL # BLD: 0.1 K/UL (ref 0–0.6)
EOSINOPHIL NFR BLD: 1 %
EPI CELLS #/AREA URNS AUTO: 7 /HPF (ref 0–5)
ERYTHROCYTE [SEDIMENTATION RATE] IN BLOOD BY WESTERGREN METHOD: 80 MM/HR (ref 0–20)
GFR SERPLBLD CREATININE-BSD FMLA CKD-EPI: >90 ML/MIN/{1.73_M2}
GLUCOSE SERPL-MCNC: 171 MG/DL (ref 70–99)
GLUCOSE UR STRIP.AUTO-MCNC: >=1000 MG/DL
HCT VFR BLD AUTO: 36.4 % (ref 36–48)
HGB BLD-MCNC: 12.4 G/DL (ref 12–16)
HGB UR QL STRIP.AUTO: ABNORMAL
HYALINE CASTS #/AREA URNS AUTO: 2 /LPF (ref 0–8)
INR PPP: 0.96 (ref 0.85–1.15)
KETONES UR STRIP.AUTO-MCNC: ABNORMAL MG/DL
LEUKOCYTE ESTERASE UR QL STRIP.AUTO: NEGATIVE
LYMPHOCYTES # BLD: 1.2 K/UL (ref 1–5.1)
LYMPHOCYTES NFR BLD: 15.4 %
MAGNESIUM SERPL-MCNC: 1.72 MG/DL (ref 1.8–2.4)
MCH RBC QN AUTO: 27.9 PG (ref 26–34)
MCHC RBC AUTO-ENTMCNC: 34.2 G/DL (ref 31–36)
MCV RBC AUTO: 81.7 FL (ref 80–100)
MONOCYTES # BLD: 0.7 K/UL (ref 0–1.3)
MONOCYTES NFR BLD: 8.9 %
NEUTROPHILS # BLD: 5.7 K/UL (ref 1.7–7.7)
NEUTROPHILS NFR BLD: 74.2 %
NITRITE UR QL STRIP.AUTO: NEGATIVE
PH UR STRIP.AUTO: 5.5 [PH] (ref 5–8)
PLATELET # BLD AUTO: 257 K/UL (ref 135–450)
PMV BLD AUTO: 8 FL (ref 5–10.5)
POTASSIUM SERPL-SCNC: 3.9 MMOL/L (ref 3.5–5.1)
PROT UR STRIP.AUTO-MCNC: 300 MG/DL
PROTHROMBIN TIME: 13 SEC (ref 11.9–14.9)
RBC # BLD AUTO: 4.45 M/UL (ref 4–5.2)
RBC CLUMPS #/AREA URNS AUTO: 1 /HPF (ref 0–4)
SODIUM SERPL-SCNC: 138 MMOL/L (ref 136–145)
SP GR UR STRIP.AUTO: 1.03 (ref 1–1.03)
UA COMPLETE W REFLEX CULTURE PNL UR: ABNORMAL
UA DIPSTICK W REFLEX MICRO PNL UR: YES
URN SPEC COLLECT METH UR: ABNORMAL
UROBILINOGEN UR STRIP-ACNC: 1 E.U./DL
WBC # BLD AUTO: 7.6 K/UL (ref 4–11)
WBC #/AREA URNS AUTO: 2 /HPF (ref 0–5)

## 2024-10-01 PROCEDURE — 96361 HYDRATE IV INFUSION ADD-ON: CPT

## 2024-10-01 PROCEDURE — 2580000003 HC RX 258: Performed by: PHYSICIAN ASSISTANT

## 2024-10-01 PROCEDURE — 85610 PROTHROMBIN TIME: CPT

## 2024-10-01 PROCEDURE — 85652 RBC SED RATE AUTOMATED: CPT

## 2024-10-01 PROCEDURE — 1200000000 HC SEMI PRIVATE

## 2024-10-01 PROCEDURE — 6360000002 HC RX W HCPCS: Performed by: PHYSICIAN ASSISTANT

## 2024-10-01 PROCEDURE — 72148 MRI LUMBAR SPINE W/O DYE: CPT

## 2024-10-01 PROCEDURE — 96374 THER/PROPH/DIAG INJ IV PUSH: CPT

## 2024-10-01 PROCEDURE — 83735 ASSAY OF MAGNESIUM: CPT

## 2024-10-01 PROCEDURE — 83036 HEMOGLOBIN GLYCOSYLATED A1C: CPT

## 2024-10-01 PROCEDURE — 99285 EMERGENCY DEPT VISIT HI MDM: CPT

## 2024-10-01 PROCEDURE — 85025 COMPLETE CBC W/AUTO DIFF WBC: CPT

## 2024-10-01 PROCEDURE — 6360000002 HC RX W HCPCS: Performed by: EMERGENCY MEDICINE

## 2024-10-01 PROCEDURE — 36415 COLL VENOUS BLD VENIPUNCTURE: CPT

## 2024-10-01 PROCEDURE — 96376 TX/PRO/DX INJ SAME DRUG ADON: CPT

## 2024-10-01 PROCEDURE — 86140 C-REACTIVE PROTEIN: CPT

## 2024-10-01 PROCEDURE — 81001 URINALYSIS AUTO W/SCOPE: CPT

## 2024-10-01 PROCEDURE — 96375 TX/PRO/DX INJ NEW DRUG ADDON: CPT

## 2024-10-01 PROCEDURE — 80048 BASIC METABOLIC PNL TOTAL CA: CPT

## 2024-10-01 RX ORDER — 0.9 % SODIUM CHLORIDE 0.9 %
1000 INTRAVENOUS SOLUTION INTRAVENOUS ONCE
Status: COMPLETED | OUTPATIENT
Start: 2024-10-01 | End: 2024-10-01

## 2024-10-01 RX ORDER — DIPHENHYDRAMINE HYDROCHLORIDE 50 MG/ML
12.5 INJECTION INTRAMUSCULAR; INTRAVENOUS ONCE
Status: COMPLETED | OUTPATIENT
Start: 2024-10-01 | End: 2024-10-01

## 2024-10-01 RX ADMIN — HYDROMORPHONE HYDROCHLORIDE 0.5 MG: 1 INJECTION, SOLUTION INTRAMUSCULAR; INTRAVENOUS; SUBCUTANEOUS at 14:16

## 2024-10-01 RX ADMIN — SODIUM CHLORIDE 1000 ML: 9 INJECTION, SOLUTION INTRAVENOUS at 12:29

## 2024-10-01 RX ADMIN — HYDROMORPHONE HYDROCHLORIDE 0.5 MG: 1 INJECTION, SOLUTION INTRAMUSCULAR; INTRAVENOUS; SUBCUTANEOUS at 18:45

## 2024-10-01 RX ADMIN — HYDROMORPHONE HYDROCHLORIDE 1 MG: 1 INJECTION, SOLUTION INTRAMUSCULAR; INTRAVENOUS; SUBCUTANEOUS at 20:54

## 2024-10-01 RX ADMIN — HYDROMORPHONE HYDROCHLORIDE 1 MG: 1 INJECTION, SOLUTION INTRAMUSCULAR; INTRAVENOUS; SUBCUTANEOUS at 12:30

## 2024-10-01 RX ADMIN — DIPHENHYDRAMINE HYDROCHLORIDE 12.5 MG: 50 INJECTION INTRAMUSCULAR; INTRAVENOUS at 14:14

## 2024-10-01 ASSESSMENT — PAIN DESCRIPTION - FREQUENCY: FREQUENCY: CONTINUOUS

## 2024-10-01 ASSESSMENT — PAIN DESCRIPTION - ONSET: ONSET: ON-GOING

## 2024-10-01 ASSESSMENT — PAIN SCALES - GENERAL
PAINLEVEL_OUTOF10: 6
PAINLEVEL_OUTOF10: 8
PAINLEVEL_OUTOF10: 6

## 2024-10-01 ASSESSMENT — PAIN DESCRIPTION - LOCATION: LOCATION: BACK

## 2024-10-01 ASSESSMENT — PAIN DESCRIPTION - PAIN TYPE: TYPE: ACUTE PAIN

## 2024-10-01 ASSESSMENT — PAIN DESCRIPTION - DESCRIPTORS: DESCRIPTORS: ACHING

## 2024-10-01 ASSESSMENT — PAIN DESCRIPTION - ORIENTATION: ORIENTATION: LOWER

## 2024-10-01 NOTE — PLAN OF CARE
[facility] Transfer accept note  Transfer center PS received  Case reviewed with ER physician  Reason for Transfer:  Jasmina Hahn 44 y.o. female  - p/w worsening low back pain and urinary incontinence.  Patient of Dr. Cook who advised the patient to go to the ER.  ER physician discussed with Dr. Cook and MRI obtained which revealed right paracentral extruded disc fragment at L4-5 and also L4-5 broad-based disc protrusion causing moderate to high-grade foraminal narrowing.  Given the worsening pain and new urinary incontinence with the above findings, patient needs to be evaluated by neurosurgery.  Per ER physicians discussion with Dr. Cook, steroids are held at this time.  His medical history significant for diabetes and renal transplant 5 years ago on Prograf.  ER physician reports discussion with NP.    - Recommendations: Admit to   - Consulting services needed for transfer: Neurosurgery     Prelim diagnosis: Worsening lumbar radiculopathy with urinary incontinence  Full note to follow.     Patient has been accepted for transfer to [facility].   Once patient arrive please page ON CALL HOSPITALIST so patient can be seen.   If unable to reach physician on [employer] please call hospitalist phone (#[phone])      doNOT admit for   Private PCP group  Dr Cadena admits for:  Robert Guillen admits for  Isela Flowers ( [facility])    Thanks  May Pham MD

## 2024-10-01 NOTE — PLAN OF CARE
NEUROSURGERY PLAN OF CARE NOTE    VERONA WALLACE  [id number]   1980   10/1/2024    ED physician: Kaiser Scherer MD    Reason for call: Progressive low back pain and urinary incontinence    History of present illness: Patient is a 44 y.o. female  has a past medical history of Anemia, Chronic kidney disease, Diabetes mellitus (HCC), Hypertension, and Obesity. Patient presented on 10/1/2024 to [facility] ED c/o back pain and episode of urinary incontinence. According to Dr. Scherer, the patient had mild back pain on Saturday with progression Sunday and loss of bladder contents intermittently.  The patient reporting paresthesia right leg along with the loss of bladder content. The patient states renal transplant approximately 5 years ago.  No prior back surgeries.  Right leg is weak compared to the left this is documented by exam as well as EMG study per the patient history to me.     Patient with history of saddle PE June, 2024 and currently on Eliquis 5 mg twice daily.  This apparently originated from the right lower leg.  No lower leg complaint other than paresthesia described as tingling involving the leg down to the foot.     Physical Exam per Neurology note:    BP (!) 161/90   Pulse 100   Temp 98.7 °F (37.1 °C) (Oral)   Resp 18   Ht 1.74 m (5' 8.5\")   Wt 134.3 kg (296 lb)   SpO2 100%   BMI 44.35 kg/m²   GCS   4 - Opens eyes on own  5 - Alert and oriented  6 - Follows simple motor commands    Neurologic:  Mental status:   orientation to person and place              General fund of knowledge grossly intact              Memory grossly intact              Attention intact as able to attend well to the exam                Language fluent in conversation              Comprehension intact; follows simple commands  Cranial nerves:   CN2: Visual Fields full w/o extinction on confrontational testing,   CN 3,4,6: extraocular muscles intact,  CN5: facial sensation symmetric   CN7:face symmetric without dysarthria,

## 2024-10-01 NOTE — CONSULTS
Reason for Consult:  Abnormal MRI lumbar spine   Attending Physician:  Kaisre Scherer MD           HISTORY OF PRESENT ILLNESS:              The patient is a 44 y.o. female who presented with back pain on 10-1-2024.  Patient contacted me and I spoke with her over the phone this morning and recommended her to come to ED.  I have seen her outpatient and she was recommended to have MRI L spine that was scheduled, but over the weekend she had worsening back pain and also had unknowingly urinated on herself.  This was the reason I sent her to ED today and did not wait for outpatient imaging.    A little background is that she has had progressive low back pain for at least a year.  When seen by me on 2024 she had BLE HF and KE R>L weakness and sensation change in left thigh and right posterior/lateral thigh/calf.  Of note she also had had EMG in past paraspinal findings by themselves non specific but usually seen in low level radiculopathy on right.  She had background sensorimotor polyneuropathy, mixed demyelinating and axonal moderate to severe range.  She had reported history of diabetes and previous low B12.  She was on OTC B12 supplement.    Patient reported to me today that she has weakness R>L.  Right arm is starting to get weak again about 2 weeks ago, but it is a small amount of weakness.  Over the weekend low back pain was worse.  She urinated on self yesterday without realizing it.  L leg numbness and right leg pain.  She had a BM on herself a few weeks ago.  She reports that she has difficulty with having BM.  No neck pain.    Past Medical History:    Past Medical History:   Diagnosis Date    Anemia     Chronic kidney disease     Diabetes mellitus (HCC)     Hypertension     Obesity        Past Surgical History:    Past Surgical History:   Procedure Laterality Date     SECTION  ,,    CHOLECYSTECTOMY      DIALYSIS FISTULA CREATION      ENDOSCOPY, COLON, DIAGNOSTIC  2018

## 2024-10-01 NOTE — ED PROVIDER NOTES
Patient seen and examined discussed with MLP agree with plan.  Brief is a 44-year-old gentleman with progressive back pain and lower extremity weakness who at this point is getting transferred to [facility] for further workup and evaluation by neurosurgery.     Kaiser Scherer MD  10/01/24 2214    
MEDICATIONS:  New Prescriptions    No medications on file       DISCONTINUED MEDICATIONS:  Discontinued Medications    No medications on file              (Please note that portions of this note were completed with a voice recognition program.  Efforts were made to edit the dictations but occasionally words are mis-transcribed.)    Myron Taveras PA-C (electronically signed)        Myron Taveras PA-C  10/01/24 8325

## 2024-10-02 ENCOUNTER — APPOINTMENT (OUTPATIENT)
Dept: VASCULAR LAB | Age: 44
DRG: 552 | End: 2024-10-02
Payer: COMMERCIAL

## 2024-10-02 ENCOUNTER — APPOINTMENT (OUTPATIENT)
Dept: MRI IMAGING | Age: 44
DRG: 552 | End: 2024-10-02
Payer: COMMERCIAL

## 2024-10-02 ENCOUNTER — APPOINTMENT (OUTPATIENT)
Dept: GENERAL RADIOLOGY | Age: 44
DRG: 552 | End: 2024-10-02
Payer: COMMERCIAL

## 2024-10-02 ENCOUNTER — APPOINTMENT (OUTPATIENT)
Dept: CT IMAGING | Age: 44
DRG: 552 | End: 2024-10-02
Payer: COMMERCIAL

## 2024-10-02 PROBLEM — M54.16 LUMBAR RADICULOPATHY: Status: ACTIVE | Noted: 2024-10-02

## 2024-10-02 LAB
ANION GAP SERPL CALCULATED.3IONS-SCNC: 14 MMOL/L (ref 3–16)
APTT BLD: 26 SEC (ref 22.1–36.4)
APTT BLD: 55.3 SEC (ref 22.1–36.4)
APTT BLD: >180 SEC (ref 22.1–36.4)
BUN SERPL-MCNC: 10 MG/DL (ref 7–20)
CALCIUM SERPL-MCNC: 8.9 MG/DL (ref 8.3–10.6)
CHLORIDE SERPL-SCNC: 99 MMOL/L (ref 99–110)
CO2 SERPL-SCNC: 24 MMOL/L (ref 21–32)
CREAT SERPL-MCNC: 0.8 MG/DL (ref 0.6–1.1)
DEPRECATED RDW RBC AUTO: 14.9 % (ref 12.4–15.4)
EST. AVERAGE GLUCOSE BLD GHB EST-MCNC: 171.4 MG/DL
GFR SERPLBLD CREATININE-BSD FMLA CKD-EPI: >90 ML/MIN/{1.73_M2}
GLUCOSE BLD-MCNC: 201 MG/DL (ref 70–99)
GLUCOSE BLD-MCNC: 202 MG/DL (ref 70–99)
GLUCOSE BLD-MCNC: 224 MG/DL (ref 70–99)
GLUCOSE SERPL-MCNC: 227 MG/DL (ref 70–99)
HBA1C MFR BLD: 7.6 %
HCT VFR BLD AUTO: 37.1 % (ref 36–48)
HGB BLD-MCNC: 12.1 G/DL (ref 12–16)
INR PPP: 1.03 (ref 0.85–1.15)
MCH RBC QN AUTO: 27.4 PG (ref 26–34)
MCHC RBC AUTO-ENTMCNC: 32.6 G/DL (ref 31–36)
MCV RBC AUTO: 84 FL (ref 80–100)
PERFORMED ON: ABNORMAL
PLATELET # BLD AUTO: 256 K/UL (ref 135–450)
PMV BLD AUTO: 7.8 FL (ref 5–10.5)
POTASSIUM SERPL-SCNC: 4.1 MMOL/L (ref 3.5–5.1)
PROTHROMBIN TIME: 13.7 SEC (ref 11.9–14.9)
RBC # BLD AUTO: 4.42 M/UL (ref 4–5.2)
SODIUM SERPL-SCNC: 137 MMOL/L (ref 136–145)
WBC # BLD AUTO: 7.2 K/UL (ref 4–11)

## 2024-10-02 PROCEDURE — 80048 BASIC METABOLIC PNL TOTAL CA: CPT

## 2024-10-02 PROCEDURE — 70551 MRI BRAIN STEM W/O DYE: CPT

## 2024-10-02 PROCEDURE — 36415 COLL VENOUS BLD VENIPUNCTURE: CPT

## 2024-10-02 PROCEDURE — 2580000003 HC RX 258: Performed by: INTERNAL MEDICINE

## 2024-10-02 PROCEDURE — 72146 MRI CHEST SPINE W/O DYE: CPT

## 2024-10-02 PROCEDURE — 72131 CT LUMBAR SPINE W/O DYE: CPT

## 2024-10-02 PROCEDURE — 6370000000 HC RX 637 (ALT 250 FOR IP): Performed by: INTERNAL MEDICINE

## 2024-10-02 PROCEDURE — 6370000000 HC RX 637 (ALT 250 FOR IP): Performed by: NURSE PRACTITIONER

## 2024-10-02 PROCEDURE — 85610 PROTHROMBIN TIME: CPT

## 2024-10-02 PROCEDURE — 93970 EXTREMITY STUDY: CPT

## 2024-10-02 PROCEDURE — 1200000000 HC SEMI PRIVATE

## 2024-10-02 PROCEDURE — 6360000002 HC RX W HCPCS: Performed by: INTERNAL MEDICINE

## 2024-10-02 PROCEDURE — 72141 MRI NECK SPINE W/O DYE: CPT

## 2024-10-02 PROCEDURE — 72120 X-RAY BEND ONLY L-S SPINE: CPT

## 2024-10-02 PROCEDURE — 6360000002 HC RX W HCPCS: Performed by: NURSE PRACTITIONER

## 2024-10-02 PROCEDURE — 85027 COMPLETE CBC AUTOMATED: CPT

## 2024-10-02 PROCEDURE — 99254 IP/OBS CNSLTJ NEW/EST MOD 60: CPT | Performed by: NURSE PRACTITIONER

## 2024-10-02 PROCEDURE — 85730 THROMBOPLASTIN TIME PARTIAL: CPT

## 2024-10-02 RX ORDER — MYCOPHENOLATE MOFETIL 250 MG/1
1000 CAPSULE ORAL 2 TIMES DAILY
Status: DISCONTINUED | OUTPATIENT
Start: 2024-10-02 | End: 2024-10-07 | Stop reason: HOSPADM

## 2024-10-02 RX ORDER — HEPARIN SODIUM 1000 [USP'U]/ML
5000 INJECTION, SOLUTION INTRAVENOUS; SUBCUTANEOUS PRN
Status: DISCONTINUED | OUTPATIENT
Start: 2024-10-02 | End: 2024-10-07

## 2024-10-02 RX ORDER — HYDROMORPHONE HYDROCHLORIDE 1 MG/ML
0.25 INJECTION, SOLUTION INTRAMUSCULAR; INTRAVENOUS; SUBCUTANEOUS EVERY 4 HOURS PRN
Status: DISCONTINUED | OUTPATIENT
Start: 2024-10-02 | End: 2024-10-07 | Stop reason: HOSPADM

## 2024-10-02 RX ORDER — TACROLIMUS 0.5 MG/1
0.5 CAPSULE ORAL EVERY MORNING
Status: DISCONTINUED | OUTPATIENT
Start: 2024-10-02 | End: 2024-10-07 | Stop reason: HOSPADM

## 2024-10-02 RX ORDER — INSULIN GLARGINE 100 [IU]/ML
20 INJECTION, SOLUTION SUBCUTANEOUS 2 TIMES DAILY
Status: DISCONTINUED | OUTPATIENT
Start: 2024-10-02 | End: 2024-10-07 | Stop reason: HOSPADM

## 2024-10-02 RX ORDER — SODIUM CHLORIDE 0.9 % (FLUSH) 0.9 %
5-40 SYRINGE (ML) INJECTION PRN
Status: DISCONTINUED | OUTPATIENT
Start: 2024-10-02 | End: 2024-10-07 | Stop reason: HOSPADM

## 2024-10-02 RX ORDER — ENTECAVIR 0.5 MG/1
0.5 TABLET, FILM COATED ORAL DAILY
Status: DISCONTINUED | OUTPATIENT
Start: 2024-10-02 | End: 2024-10-07 | Stop reason: HOSPADM

## 2024-10-02 RX ORDER — ONDANSETRON 2 MG/ML
4 INJECTION INTRAMUSCULAR; INTRAVENOUS EVERY 6 HOURS PRN
Status: DISCONTINUED | OUTPATIENT
Start: 2024-10-02 | End: 2024-10-07 | Stop reason: HOSPADM

## 2024-10-02 RX ORDER — DIAZEPAM 5 MG
5 TABLET ORAL EVERY 6 HOURS PRN
Status: DISCONTINUED | OUTPATIENT
Start: 2024-10-02 | End: 2024-10-07 | Stop reason: HOSPADM

## 2024-10-02 RX ORDER — FAMOTIDINE 20 MG/1
20 TABLET, FILM COATED ORAL DAILY
Status: DISCONTINUED | OUTPATIENT
Start: 2024-10-02 | End: 2024-10-07 | Stop reason: HOSPADM

## 2024-10-02 RX ORDER — AMLODIPINE BESYLATE 10 MG/1
10 TABLET ORAL DAILY
Status: DISCONTINUED | OUTPATIENT
Start: 2024-10-02 | End: 2024-10-07 | Stop reason: HOSPADM

## 2024-10-02 RX ORDER — PREGABALIN 25 MG/1
25 CAPSULE ORAL 3 TIMES DAILY
Status: DISCONTINUED | OUTPATIENT
Start: 2024-10-02 | End: 2024-10-07 | Stop reason: HOSPADM

## 2024-10-02 RX ORDER — DIPHENHYDRAMINE HCL 25 MG
25 TABLET ORAL EVERY 6 HOURS PRN
Status: DISCONTINUED | OUTPATIENT
Start: 2024-10-02 | End: 2024-10-04

## 2024-10-02 RX ORDER — SODIUM CHLORIDE 0.9 % (FLUSH) 0.9 %
5-40 SYRINGE (ML) INJECTION EVERY 12 HOURS SCHEDULED
Status: DISCONTINUED | OUTPATIENT
Start: 2024-10-02 | End: 2024-10-07 | Stop reason: HOSPADM

## 2024-10-02 RX ORDER — CITALOPRAM HYDROBROMIDE 20 MG/1
20 TABLET ORAL NIGHTLY
Status: DISCONTINUED | OUTPATIENT
Start: 2024-10-02 | End: 2024-10-07 | Stop reason: HOSPADM

## 2024-10-02 RX ORDER — POTASSIUM CHLORIDE 7.45 MG/ML
10 INJECTION INTRAVENOUS PRN
Status: DISCONTINUED | OUTPATIENT
Start: 2024-10-02 | End: 2024-10-07 | Stop reason: HOSPADM

## 2024-10-02 RX ORDER — HEPARIN SODIUM 1000 [USP'U]/ML
10000 INJECTION, SOLUTION INTRAVENOUS; SUBCUTANEOUS PRN
Status: DISCONTINUED | OUTPATIENT
Start: 2024-10-02 | End: 2024-10-07

## 2024-10-02 RX ORDER — DEXTROSE MONOHYDRATE 100 MG/ML
INJECTION, SOLUTION INTRAVENOUS CONTINUOUS PRN
Status: DISCONTINUED | OUTPATIENT
Start: 2024-10-02 | End: 2024-10-07 | Stop reason: HOSPADM

## 2024-10-02 RX ORDER — TACROLIMUS 0.5 MG/1
2 CAPSULE ORAL
Status: DISCONTINUED | OUTPATIENT
Start: 2024-10-02 | End: 2024-10-07 | Stop reason: HOSPADM

## 2024-10-02 RX ORDER — ACETAMINOPHEN 650 MG/1
650 SUPPOSITORY RECTAL EVERY 6 HOURS PRN
Status: DISCONTINUED | OUTPATIENT
Start: 2024-10-02 | End: 2024-10-07 | Stop reason: HOSPADM

## 2024-10-02 RX ORDER — LISINOPRIL 20 MG/1
20 TABLET ORAL DAILY
Status: DISCONTINUED | OUTPATIENT
Start: 2024-10-02 | End: 2024-10-07 | Stop reason: HOSPADM

## 2024-10-02 RX ORDER — TACROLIMUS 0.5 MG/1
1 CAPSULE ORAL EVERY MORNING
Status: DISCONTINUED | OUTPATIENT
Start: 2024-10-02 | End: 2024-10-07 | Stop reason: HOSPADM

## 2024-10-02 RX ORDER — OXYCODONE HYDROCHLORIDE 5 MG/1
10 TABLET ORAL EVERY 4 HOURS PRN
Status: DISCONTINUED | OUTPATIENT
Start: 2024-10-02 | End: 2024-10-07 | Stop reason: HOSPADM

## 2024-10-02 RX ORDER — ACETAMINOPHEN 325 MG/1
650 TABLET ORAL EVERY 6 HOURS PRN
Status: DISCONTINUED | OUTPATIENT
Start: 2024-10-02 | End: 2024-10-07 | Stop reason: HOSPADM

## 2024-10-02 RX ORDER — SODIUM CHLORIDE 9 MG/ML
INJECTION, SOLUTION INTRAVENOUS PRN
Status: DISCONTINUED | OUTPATIENT
Start: 2024-10-02 | End: 2024-10-07 | Stop reason: HOSPADM

## 2024-10-02 RX ORDER — MORPHINE SULFATE 2 MG/ML
2 INJECTION, SOLUTION INTRAMUSCULAR; INTRAVENOUS EVERY 4 HOURS PRN
Status: DISCONTINUED | OUTPATIENT
Start: 2024-10-02 | End: 2024-10-02

## 2024-10-02 RX ORDER — POTASSIUM CHLORIDE 1500 MG/1
40 TABLET, EXTENDED RELEASE ORAL PRN
Status: DISCONTINUED | OUTPATIENT
Start: 2024-10-02 | End: 2024-10-07 | Stop reason: HOSPADM

## 2024-10-02 RX ORDER — MAGNESIUM SULFATE IN WATER 40 MG/ML
2000 INJECTION, SOLUTION INTRAVENOUS PRN
Status: DISCONTINUED | OUTPATIENT
Start: 2024-10-02 | End: 2024-10-07 | Stop reason: HOSPADM

## 2024-10-02 RX ORDER — GLUCAGON 1 MG/ML
1 KIT INJECTION PRN
Status: DISCONTINUED | OUTPATIENT
Start: 2024-10-02 | End: 2024-10-07 | Stop reason: HOSPADM

## 2024-10-02 RX ORDER — HEPARIN SODIUM 10000 [USP'U]/100ML
5-30 INJECTION, SOLUTION INTRAVENOUS CONTINUOUS
Status: DISCONTINUED | OUTPATIENT
Start: 2024-10-02 | End: 2024-10-07

## 2024-10-02 RX ORDER — HEPARIN SODIUM 1000 [USP'U]/ML
10000 INJECTION, SOLUTION INTRAVENOUS; SUBCUTANEOUS ONCE
Status: COMPLETED | OUTPATIENT
Start: 2024-10-02 | End: 2024-10-02

## 2024-10-02 RX ORDER — METHOCARBAMOL 500 MG/1
1000 TABLET, FILM COATED ORAL 4 TIMES DAILY
Status: DISCONTINUED | OUTPATIENT
Start: 2024-10-02 | End: 2024-10-02

## 2024-10-02 RX ORDER — OXYCODONE HYDROCHLORIDE 5 MG/1
5 TABLET ORAL EVERY 4 HOURS PRN
Status: DISCONTINUED | OUTPATIENT
Start: 2024-10-02 | End: 2024-10-07 | Stop reason: HOSPADM

## 2024-10-02 RX ORDER — POLYETHYLENE GLYCOL 3350 17 G/17G
17 POWDER, FOR SOLUTION ORAL DAILY PRN
Status: DISCONTINUED | OUTPATIENT
Start: 2024-10-02 | End: 2024-10-07 | Stop reason: HOSPADM

## 2024-10-02 RX ORDER — INSULIN LISPRO 100 [IU]/ML
10 INJECTION, SOLUTION INTRAVENOUS; SUBCUTANEOUS
Status: DISCONTINUED | OUTPATIENT
Start: 2024-10-02 | End: 2024-10-07 | Stop reason: HOSPADM

## 2024-10-02 RX ORDER — ONDANSETRON 4 MG/1
4 TABLET, ORALLY DISINTEGRATING ORAL EVERY 8 HOURS PRN
Status: DISCONTINUED | OUTPATIENT
Start: 2024-10-02 | End: 2024-10-07 | Stop reason: HOSPADM

## 2024-10-02 RX ADMIN — MORPHINE SULFATE 2 MG: 2 INJECTION, SOLUTION INTRAMUSCULAR; INTRAVENOUS at 06:55

## 2024-10-02 RX ADMIN — METHOCARBAMOL TABLETS 1000 MG: 500 TABLET, COATED ORAL at 02:26

## 2024-10-02 RX ADMIN — MYCOPHENOLATE MOFETIL 1000 MG: 250 CAPSULE ORAL at 10:04

## 2024-10-02 RX ADMIN — DIPHENHYDRAMINE HYDROCHLORIDE 25 MG: 25 TABLET ORAL at 02:39

## 2024-10-02 RX ADMIN — INSULIN GLARGINE 20 UNITS: 100 INJECTION, SOLUTION SUBCUTANEOUS at 02:27

## 2024-10-02 RX ADMIN — PREGABALIN 25 MG: 25 CAPSULE ORAL at 22:01

## 2024-10-02 RX ADMIN — TIZANIDINE 2 MG: 4 TABLET ORAL at 16:10

## 2024-10-02 RX ADMIN — OXYCODONE 10 MG: 5 TABLET ORAL at 12:11

## 2024-10-02 RX ADMIN — TIZANIDINE 2 MG: 4 TABLET ORAL at 22:01

## 2024-10-02 RX ADMIN — CITALOPRAM HYDROBROMIDE 20 MG: 20 TABLET ORAL at 02:26

## 2024-10-02 RX ADMIN — ENTECAVIR 0.5 MG: 0.5 TABLET, FILM COATED ORAL at 11:15

## 2024-10-02 RX ADMIN — HEPARIN SODIUM 10000 UNITS: 1000 INJECTION INTRAVENOUS; SUBCUTANEOUS at 06:44

## 2024-10-02 RX ADMIN — TACROLIMUS 2 MG: 0.5 CAPSULE ORAL at 22:02

## 2024-10-02 RX ADMIN — LISINOPRIL 20 MG: 20 TABLET ORAL at 10:05

## 2024-10-02 RX ADMIN — INSULIN LISPRO 10 UNITS: 100 INJECTION, SOLUTION INTRAVENOUS; SUBCUTANEOUS at 13:03

## 2024-10-02 RX ADMIN — PREGABALIN 25 MG: 25 CAPSULE ORAL at 16:11

## 2024-10-02 RX ADMIN — INSULIN LISPRO 10 UNITS: 100 INJECTION, SOLUTION INTRAVENOUS; SUBCUTANEOUS at 17:05

## 2024-10-02 RX ADMIN — DIAZEPAM 5 MG: 5 TABLET ORAL at 23:36

## 2024-10-02 RX ADMIN — OXYCODONE 10 MG: 5 TABLET ORAL at 22:02

## 2024-10-02 RX ADMIN — HYDROMORPHONE HYDROCHLORIDE 0.25 MG: 1 INJECTION, SOLUTION INTRAMUSCULAR; INTRAVENOUS; SUBCUTANEOUS at 10:29

## 2024-10-02 RX ADMIN — HEPARIN SODIUM 14 UNITS/KG/HR: 10000 INJECTION, SOLUTION INTRAVENOUS at 07:17

## 2024-10-02 RX ADMIN — TACROLIMUS 1 MG: 0.5 CAPSULE ORAL at 10:04

## 2024-10-02 RX ADMIN — PREGABALIN 25 MG: 25 CAPSULE ORAL at 10:05

## 2024-10-02 RX ADMIN — INSULIN GLARGINE 20 UNITS: 100 INJECTION, SOLUTION SUBCUTANEOUS at 22:02

## 2024-10-02 RX ADMIN — SODIUM CHLORIDE, PRESERVATIVE FREE 10 ML: 5 INJECTION INTRAVENOUS at 10:30

## 2024-10-02 RX ADMIN — INSULIN GLARGINE 20 UNITS: 100 INJECTION, SOLUTION SUBCUTANEOUS at 10:03

## 2024-10-02 RX ADMIN — METHOCARBAMOL TABLETS 1000 MG: 500 TABLET, COATED ORAL at 10:04

## 2024-10-02 RX ADMIN — SODIUM CHLORIDE, PRESERVATIVE FREE 10 ML: 5 INJECTION INTRAVENOUS at 22:30

## 2024-10-02 RX ADMIN — MYCOPHENOLATE MOFETIL 1000 MG: 250 CAPSULE ORAL at 22:02

## 2024-10-02 RX ADMIN — FAMOTIDINE 20 MG: 20 TABLET, FILM COATED ORAL at 10:05

## 2024-10-02 RX ADMIN — CITALOPRAM HYDROBROMIDE 20 MG: 20 TABLET ORAL at 22:01

## 2024-10-02 RX ADMIN — HEPARIN SODIUM 10000 UNITS: 1000 INJECTION INTRAVENOUS; SUBCUTANEOUS at 16:11

## 2024-10-02 RX ADMIN — INSULIN LISPRO 10 UNITS: 100 INJECTION, SOLUTION INTRAVENOUS; SUBCUTANEOUS at 10:03

## 2024-10-02 RX ADMIN — TACROLIMUS 0.5 MG: 0.5 CAPSULE ORAL at 10:07

## 2024-10-02 RX ADMIN — MORPHINE SULFATE 2 MG: 2 INJECTION, SOLUTION INTRAMUSCULAR; INTRAVENOUS at 02:27

## 2024-10-02 RX ADMIN — DIPHENHYDRAMINE HYDROCHLORIDE 25 MG: 25 TABLET ORAL at 12:11

## 2024-10-02 RX ADMIN — AMLODIPINE BESYLATE 10 MG: 10 TABLET ORAL at 10:05

## 2024-10-02 RX ADMIN — DIPHENHYDRAMINE HYDROCHLORIDE 25 MG: 25 TABLET ORAL at 22:01

## 2024-10-02 ASSESSMENT — PAIN DESCRIPTION - PAIN TYPE
TYPE: ACUTE PAIN

## 2024-10-02 ASSESSMENT — PAIN DESCRIPTION - DESCRIPTORS
DESCRIPTORS: SHARP;ACHING;DISCOMFORT
DESCRIPTORS: SHARP;ACHING;DISCOMFORT
DESCRIPTORS: ACHING
DESCRIPTORS: ACHING
DESCRIPTORS: ACHING;DULL
DESCRIPTORS: ACHING;DULL
DESCRIPTORS: ACHING;DULL;PRESSURE
DESCRIPTORS: ACHING

## 2024-10-02 ASSESSMENT — PAIN SCALES - GENERAL
PAINLEVEL_OUTOF10: 5
PAINLEVEL_OUTOF10: 5
PAINLEVEL_OUTOF10: 7
PAINLEVEL_OUTOF10: 7
PAINLEVEL_OUTOF10: 8
PAINLEVEL_OUTOF10: 4
PAINLEVEL_OUTOF10: 6
PAINLEVEL_OUTOF10: 7
PAINLEVEL_OUTOF10: 8
PAINLEVEL_OUTOF10: 9
PAINLEVEL_OUTOF10: 7
PAINLEVEL_OUTOF10: 5
PAINLEVEL_OUTOF10: 4
PAINLEVEL_OUTOF10: 6

## 2024-10-02 ASSESSMENT — PAIN - FUNCTIONAL ASSESSMENT
PAIN_FUNCTIONAL_ASSESSMENT: PREVENTS OR INTERFERES SOME ACTIVE ACTIVITIES AND ADLS
PAIN_FUNCTIONAL_ASSESSMENT: ACTIVITIES ARE NOT PREVENTED
PAIN_FUNCTIONAL_ASSESSMENT: PREVENTS OR INTERFERES SOME ACTIVE ACTIVITIES AND ADLS

## 2024-10-02 ASSESSMENT — PAIN DESCRIPTION - ONSET
ONSET: ON-GOING

## 2024-10-02 ASSESSMENT — PAIN DESCRIPTION - LOCATION
LOCATION: BACK
LOCATION: BACK
LOCATION: BACK;BUTTOCKS;LEG
LOCATION: BACK
LOCATION: BACK
LOCATION: BACK;LEG
LOCATION: BACK
LOCATION: BACK;BUTTOCKS

## 2024-10-02 ASSESSMENT — PAIN DESCRIPTION - FREQUENCY
FREQUENCY: CONTINUOUS

## 2024-10-02 ASSESSMENT — PAIN DESCRIPTION - ORIENTATION
ORIENTATION: MID;LOWER;RIGHT
ORIENTATION: MID;RIGHT
ORIENTATION: POSTERIOR;MID;LOWER
ORIENTATION: LOWER
ORIENTATION: LOWER;MID
ORIENTATION: MID;LOWER
ORIENTATION: MID;RIGHT

## 2024-10-02 ASSESSMENT — PAIN DESCRIPTION - DIRECTION: RADIATING_TOWARDS: DOWN RIGHT LEG

## 2024-10-02 NOTE — ED NOTES
EMS transport with patient to [facility]; patient verbalizes understanding of transfer orders and has no questions. No acute distress noted. Patient's family to follow transport to facility.

## 2024-10-02 NOTE — CONSULTS
NEUROSURGERY CONSULT NOTE    VERONA WALLACE  [id number]   1980   10/2/2024    Requesting physician: May DE LOS SANTOS MD    Reason for consultation: Disc Herniation    History of present illness: Patient is a 44 y.o. female  has a past medical history of Anemia, Chronic kidney disease, Diabetes mellitus (HCC), Hypertension, and Obesity. Patient presented on 10/1/2024 to [facility] ED c/o back pain and episode of urinary incontinence. The patient had mild back pain on Saturday with progression Sunday and loss of bladder contents intermittently.  The patient reporting paresthesia right leg along with the loss of bladder content. The patient states renal transplant approximately 5 years ago.  No prior back surgeries.     Patient with history of saddle PE June, 2024 and currently on Eliquis 5 mg twice daily.  This apparently originated from the right lower leg.  No lower leg complaint other than paresthesia described as tingling involving the leg down to the foot.     Patient was seen by Dr. Cook with [facility] on 9/4/2024 where she had BLE HF and KE (R>L) weakness. Patient also had sensation change in left thigh and right posterior/lateral thigh/calf. Of note she also had had EMG in past that showed sensorimotor polyneuropathy, mixed demyelinating and axonal moderate to severe range. Patient was seen by Dr. Cook in the [facility] ED, prior to transfer to [facility], and her exam showed RLE HF/KF/KE weakness (3-4/5), right DF/PF are strong, BUE/LLE are strong, BLE lateral thigh numbness, and clonus in both feet.     ROS:   GENERAL:  Denies fever or recent illness. Denies weight changes   EYES:  Denies vision change or diplopia  EARS:  Denies hearing loss  CARDIAC:  Denies chest pain  RESPIRATORY:  Denies shortness of breath  SKIN:  Denies rash or lesions   HEM:  Denies excessive bruising  PSYCH:  Denies anxiety or depression  NEURO: Endorses weakness in right arm, right leg and numbness in both thighs;

## 2024-10-02 NOTE — ED NOTES
Transport here now to take pt to [facility] via ambulance; pt alert and oriented, skin warm and dry, respirations even and unlabored.

## 2024-10-02 NOTE — H&P
Hospital Medicine History & Physical      Date of Admission: 10/1/2024    Date of Service:  Pt seen/examined on 10/02/24     [x]Admitted to Inpatient with expected LOS greater than two midnights due to medical therapy.  []Placed in Observation status.    Chief Admission Complaint: Back pain and urinary incontinence    Presenting Admission History:      44 y.o. female with PMHx significant for chronic back pain, diabetes mellitus, renal failure s/p DD kidney transplant, pulmonary embolism diagnosed 6/2024 on Eliquis, essential hypertension, obesity, hepatitis B virus who [facility] with a complaint of worsening back pain associated with urinary incontinence and worsening right lower extremity pain.  Patient states that she has been having chronic back pain for the past couple of months.  She indicates that her right leg has been weak for several weeks however for the past couple of days patient has been experiencing worsening back pain and worsening weakness of her right leg.  Patient was not able to ambulate for the last couple of days.  Patient also started developing urinary incontinence.  Patient called her physician Dr. Cook who ordered an MRI which revealed L4-L5 extrusion of disc fragments called high-grade foraminal stenosis.  Her physician also recommended that the patient would go to the ER for further evaluation.  Patient currently denies any other complaints at this time.  Patient states that her pain is worse when she tries to move out of bed.    ROS:     Review of 10 systems is negative except what is outlined in HPI.     Assessment:  L4-L5 extruded disc fragments with high-grade foraminal stenosis  Intractable back pain secondary to above.  Urinary incontinence  Right lower extremity weakness  Inability to ambulate.  Morbid obesity, Body mass index is 44.35 kg/m².   History of saddle pulmonary embolism, diagnosed 6/2024.  Type 2 diabetes mellitus.  Essential hypertension  Status

## 2024-10-02 NOTE — CARE COORDINATION
Case Management Assessment  Initial Evaluation    Date/Time of Evaluation: 10/2/2024 2:52 PM  Assessment Completed by: IRAIS Priest    If patient is discharged prior to next notation, then this note serves as note for discharge by case management.    Patient Name: Jasmina Hahn                   YOB: 1980  Diagnosis: Lumbar radiculopathy [M54.16]                   Date / Time: 10/1/2024 11:45 PM    Patient Admission Status: Inpatient   Readmission Risk (Low < 19, Mod (19-27), High > 27): Readmission Risk Score: 14.5    Current PCP: Cassie Harman MD  PCP verified by CM? Yes    Chart Reviewed: Yes      History Provided by: Medical Record  Patient Orientation: Other (see comment) (OOR)    Patient Cognition: Other (see comment) (OOR)    Hospitalization in the last 30 days (Readmission):  No    If yes, Readmission Assessment in  Navigator will be completed.    Advance Directives:      Code Status: Full Code   Patient's Primary Decision Maker is: Legal Next of Kin      Discharge Planning:    Patient lives with: Spouse/Significant Other Type of Home: House  Primary Care Giver: Self  Patient Support Systems include: Spouse/Significant Other   Current Financial resources: Other (Comment) (commercial insurance)  Current community resources: ECF/Home Care (was active with [facility] in July 2024)  Current services prior to admission: Durable Medical Equipment            Current DME: Cane, Walker, Shower Chair, Other (Comment) (raised toilet seat)            Type of Home Care services:  None    ADLS  Prior functional level: Independent in ADLs/IADLs  Current functional level: Other (see comment) (PT/OT evals pending)    PT AM-PAC:   /24  OT AM-PAC:   /24    Family can provide assistance at DC: Yes  Would you like Case Management to discuss the discharge plan with any other family members/significant others, and if so, who? No  Plans to Return to Present Housing: Unknown at present  Other

## 2024-10-03 LAB
ANION GAP SERPL CALCULATED.3IONS-SCNC: 9 MMOL/L (ref 3–16)
APTT BLD: 94.1 SEC (ref 22.1–36.4)
APTT BLD: 94.1 SEC (ref 22.1–36.4)
APTT BLD: 94.5 SEC (ref 22.1–36.4)
BASOPHILS # BLD: 0 K/UL (ref 0–0.2)
BASOPHILS NFR BLD: 0.5 %
BUN SERPL-MCNC: 12 MG/DL (ref 7–20)
CALCIUM SERPL-MCNC: 9.6 MG/DL (ref 8.3–10.6)
CHLORIDE SERPL-SCNC: 101 MMOL/L (ref 99–110)
CO2 SERPL-SCNC: 27 MMOL/L (ref 21–32)
CREAT SERPL-MCNC: 0.9 MG/DL (ref 0.6–1.1)
DEPRECATED RDW RBC AUTO: 14.8 % (ref 12.4–15.4)
ECHO BSA: 2.55 M2
EOSINOPHIL # BLD: 0.1 K/UL (ref 0–0.6)
EOSINOPHIL NFR BLD: 1.9 %
GFR SERPLBLD CREATININE-BSD FMLA CKD-EPI: 82 ML/MIN/{1.73_M2}
GLUCOSE BLD-MCNC: 174 MG/DL (ref 70–99)
GLUCOSE BLD-MCNC: 184 MG/DL (ref 70–99)
GLUCOSE BLD-MCNC: 196 MG/DL (ref 70–99)
GLUCOSE BLD-MCNC: 210 MG/DL (ref 70–99)
GLUCOSE BLD-MCNC: 260 MG/DL (ref 70–99)
GLUCOSE SERPL-MCNC: 175 MG/DL (ref 70–99)
HCT VFR BLD AUTO: 36.6 % (ref 36–48)
HGB BLD-MCNC: 11.9 G/DL (ref 12–16)
LYMPHOCYTES # BLD: 1.2 K/UL (ref 1–5.1)
LYMPHOCYTES NFR BLD: 18.1 %
MCH RBC QN AUTO: 27.4 PG (ref 26–34)
MCHC RBC AUTO-ENTMCNC: 32.5 G/DL (ref 31–36)
MCV RBC AUTO: 84.3 FL (ref 80–100)
MONOCYTES # BLD: 0.6 K/UL (ref 0–1.3)
MONOCYTES NFR BLD: 9.3 %
NEUTROPHILS # BLD: 4.6 K/UL (ref 1.7–7.7)
NEUTROPHILS NFR BLD: 70.2 %
PERFORMED ON: ABNORMAL
PLATELET # BLD AUTO: 269 K/UL (ref 135–450)
PMV BLD AUTO: 8.2 FL (ref 5–10.5)
POTASSIUM SERPL-SCNC: 4 MMOL/L (ref 3.5–5.1)
RBC # BLD AUTO: 4.34 M/UL (ref 4–5.2)
SODIUM SERPL-SCNC: 137 MMOL/L (ref 136–145)
WBC # BLD AUTO: 6.6 K/UL (ref 4–11)

## 2024-10-03 PROCEDURE — APPNB45 APP NON BILLABLE 31-45 MINUTES

## 2024-10-03 PROCEDURE — 6370000000 HC RX 637 (ALT 250 FOR IP): Performed by: INTERNAL MEDICINE

## 2024-10-03 PROCEDURE — 6370000000 HC RX 637 (ALT 250 FOR IP): Performed by: NURSE PRACTITIONER

## 2024-10-03 PROCEDURE — 93970 EXTREMITY STUDY: CPT | Performed by: SURGERY

## 2024-10-03 PROCEDURE — 97535 SELF CARE MNGMENT TRAINING: CPT

## 2024-10-03 PROCEDURE — 6360000002 HC RX W HCPCS: Performed by: INTERNAL MEDICINE

## 2024-10-03 PROCEDURE — 80048 BASIC METABOLIC PNL TOTAL CA: CPT

## 2024-10-03 PROCEDURE — 85025 COMPLETE CBC W/AUTO DIFF WBC: CPT

## 2024-10-03 PROCEDURE — 97162 PT EVAL MOD COMPLEX 30 MIN: CPT

## 2024-10-03 PROCEDURE — 85730 THROMBOPLASTIN TIME PARTIAL: CPT

## 2024-10-03 PROCEDURE — 2580000003 HC RX 258: Performed by: INTERNAL MEDICINE

## 2024-10-03 PROCEDURE — 6360000002 HC RX W HCPCS: Performed by: NURSE PRACTITIONER

## 2024-10-03 PROCEDURE — 97530 THERAPEUTIC ACTIVITIES: CPT

## 2024-10-03 PROCEDURE — 97166 OT EVAL MOD COMPLEX 45 MIN: CPT

## 2024-10-03 PROCEDURE — 1200000000 HC SEMI PRIVATE

## 2024-10-03 PROCEDURE — 36415 COLL VENOUS BLD VENIPUNCTURE: CPT

## 2024-10-03 PROCEDURE — 97116 GAIT TRAINING THERAPY: CPT

## 2024-10-03 RX ORDER — DIPHENHYDRAMINE HYDROCHLORIDE 50 MG/ML
25 INJECTION INTRAMUSCULAR; INTRAVENOUS
Status: COMPLETED | OUTPATIENT
Start: 2024-10-03 | End: 2024-10-03

## 2024-10-03 RX ADMIN — HEPARIN SODIUM 15 UNITS/KG/HR: 10000 INJECTION, SOLUTION INTRAVENOUS at 13:31

## 2024-10-03 RX ADMIN — SODIUM CHLORIDE, PRESERVATIVE FREE 10 ML: 5 INJECTION INTRAVENOUS at 08:26

## 2024-10-03 RX ADMIN — INSULIN LISPRO 10 UNITS: 100 INJECTION, SOLUTION INTRAVENOUS; SUBCUTANEOUS at 16:53

## 2024-10-03 RX ADMIN — DIPHENHYDRAMINE HYDROCHLORIDE 25 MG: 50 INJECTION INTRAMUSCULAR; INTRAVENOUS at 23:41

## 2024-10-03 RX ADMIN — INSULIN GLARGINE 20 UNITS: 100 INJECTION, SOLUTION SUBCUTANEOUS at 08:12

## 2024-10-03 RX ADMIN — MYCOPHENOLATE MOFETIL 1000 MG: 250 CAPSULE ORAL at 08:23

## 2024-10-03 RX ADMIN — PREGABALIN 25 MG: 25 CAPSULE ORAL at 19:31

## 2024-10-03 RX ADMIN — CITALOPRAM HYDROBROMIDE 20 MG: 20 TABLET ORAL at 19:31

## 2024-10-03 RX ADMIN — OXYCODONE 10 MG: 5 TABLET ORAL at 12:19

## 2024-10-03 RX ADMIN — HYDROMORPHONE HYDROCHLORIDE 0.25 MG: 1 INJECTION, SOLUTION INTRAMUSCULAR; INTRAVENOUS; SUBCUTANEOUS at 00:53

## 2024-10-03 RX ADMIN — TACROLIMUS 0.5 MG: 0.5 CAPSULE ORAL at 08:13

## 2024-10-03 RX ADMIN — OXYCODONE 10 MG: 5 TABLET ORAL at 03:47

## 2024-10-03 RX ADMIN — ONDANSETRON 4 MG: 2 INJECTION INTRAMUSCULAR; INTRAVENOUS at 08:14

## 2024-10-03 RX ADMIN — TACROLIMUS 1 MG: 0.5 CAPSULE ORAL at 08:23

## 2024-10-03 RX ADMIN — TIZANIDINE 2 MG: 4 TABLET ORAL at 14:21

## 2024-10-03 RX ADMIN — MYCOPHENOLATE MOFETIL 1000 MG: 250 CAPSULE ORAL at 19:31

## 2024-10-03 RX ADMIN — LISINOPRIL 20 MG: 20 TABLET ORAL at 08:13

## 2024-10-03 RX ADMIN — INSULIN GLARGINE 20 UNITS: 100 INJECTION, SOLUTION SUBCUTANEOUS at 19:38

## 2024-10-03 RX ADMIN — PREGABALIN 25 MG: 25 CAPSULE ORAL at 14:21

## 2024-10-03 RX ADMIN — INSULIN LISPRO 10 UNITS: 100 INJECTION, SOLUTION INTRAVENOUS; SUBCUTANEOUS at 08:12

## 2024-10-03 RX ADMIN — OXYCODONE 10 MG: 5 TABLET ORAL at 16:28

## 2024-10-03 RX ADMIN — HYDROMORPHONE HYDROCHLORIDE 0.25 MG: 1 INJECTION, SOLUTION INTRAMUSCULAR; INTRAVENOUS; SUBCUTANEOUS at 22:10

## 2024-10-03 RX ADMIN — FAMOTIDINE 20 MG: 20 TABLET, FILM COATED ORAL at 08:13

## 2024-10-03 RX ADMIN — DIPHENHYDRAMINE HYDROCHLORIDE 25 MG: 25 TABLET ORAL at 03:47

## 2024-10-03 RX ADMIN — HYDROMORPHONE HYDROCHLORIDE 0.25 MG: 1 INJECTION, SOLUTION INTRAMUSCULAR; INTRAVENOUS; SUBCUTANEOUS at 05:54

## 2024-10-03 RX ADMIN — AMLODIPINE BESYLATE 10 MG: 10 TABLET ORAL at 08:13

## 2024-10-03 RX ADMIN — TACROLIMUS 2 MG: 0.5 CAPSULE ORAL at 19:31

## 2024-10-03 RX ADMIN — TIZANIDINE 2 MG: 4 TABLET ORAL at 19:31

## 2024-10-03 RX ADMIN — TIZANIDINE 2 MG: 4 TABLET ORAL at 08:13

## 2024-10-03 RX ADMIN — INSULIN LISPRO 10 UNITS: 100 INJECTION, SOLUTION INTRAVENOUS; SUBCUTANEOUS at 12:19

## 2024-10-03 RX ADMIN — ENTECAVIR 0.5 MG: 0.5 TABLET, FILM COATED ORAL at 08:23

## 2024-10-03 RX ADMIN — PREGABALIN 25 MG: 25 CAPSULE ORAL at 08:14

## 2024-10-03 RX ADMIN — HEPARIN SODIUM 15 UNITS/KG/HR: 10000 INJECTION, SOLUTION INTRAVENOUS at 00:53

## 2024-10-03 RX ADMIN — OXYCODONE 10 MG: 5 TABLET ORAL at 23:41

## 2024-10-03 ASSESSMENT — PAIN DESCRIPTION - PAIN TYPE
TYPE: ACUTE PAIN

## 2024-10-03 ASSESSMENT — PAIN DESCRIPTION - LOCATION
LOCATION: BACK
LOCATION: BACK
LOCATION: BACK;LEG
LOCATION: BACK;LEG
LOCATION: BACK

## 2024-10-03 ASSESSMENT — PAIN DESCRIPTION - ORIENTATION
ORIENTATION: MID;LOWER
ORIENTATION: MID;LOWER
ORIENTATION: RIGHT
ORIENTATION: MID;POSTERIOR
ORIENTATION: RIGHT;LEFT;LOWER
ORIENTATION: RIGHT;LOWER;LEFT
ORIENTATION: MID;LOWER

## 2024-10-03 ASSESSMENT — PAIN DESCRIPTION - DESCRIPTORS
DESCRIPTORS: ACHING
DESCRIPTORS: ACHING;THROBBING;STABBING
DESCRIPTORS: ACHING
DESCRIPTORS: ACHING;THROBBING
DESCRIPTORS: ACHING

## 2024-10-03 ASSESSMENT — PAIN SCALES - GENERAL
PAINLEVEL_OUTOF10: 7
PAINLEVEL_OUTOF10: 7
PAINLEVEL_OUTOF10: 5
PAINLEVEL_OUTOF10: 3
PAINLEVEL_OUTOF10: 3
PAINLEVEL_OUTOF10: 7
PAINLEVEL_OUTOF10: 5
PAINLEVEL_OUTOF10: 7
PAINLEVEL_OUTOF10: 5
PAINLEVEL_OUTOF10: 7

## 2024-10-03 ASSESSMENT — PAIN - FUNCTIONAL ASSESSMENT

## 2024-10-03 ASSESSMENT — PAIN DESCRIPTION - FREQUENCY
FREQUENCY: CONTINUOUS

## 2024-10-03 ASSESSMENT — PAIN DESCRIPTION - DIRECTION
RADIATING_TOWARDS: R LEG

## 2024-10-03 ASSESSMENT — PAIN DESCRIPTION - ONSET
ONSET: ON-GOING

## 2024-10-03 NOTE — CARE COORDINATION
CM following: CM met with pt at bedside. Pt lives with her spouse and three children and recently has been utilizing a cane at home. Pt reports she will need FMLA paperwork completed - pt to email these documents to the CM for CM to print and provide to nrsgy team for completion. CM will continue to follow for discharge planning.  Electronically signed by IRAIS Priest on 10/3/2024 at 12:15 PM  [phone]

## 2024-10-04 PROBLEM — G89.29 CHRONIC MIDLINE LOW BACK PAIN WITH RIGHT-SIDED SCIATICA: Status: ACTIVE | Noted: 2024-10-04

## 2024-10-04 PROBLEM — R29.898 WEAKNESS OF RIGHT LOWER EXTREMITY: Status: ACTIVE | Noted: 2024-10-04

## 2024-10-04 PROBLEM — M54.41 CHRONIC MIDLINE LOW BACK PAIN WITH RIGHT-SIDED SCIATICA: Status: ACTIVE | Noted: 2024-10-04

## 2024-10-04 LAB
ANION GAP SERPL CALCULATED.3IONS-SCNC: 10 MMOL/L (ref 3–16)
ANTI-XA UNFRAC HEPARIN: 0.6 IU/ML (ref 0.3–0.7)
ANTI-XA UNFRAC HEPARIN: 0.64 IU/ML (ref 0.3–0.7)
APTT BLD: 87.6 SEC (ref 22.1–36.4)
BASOPHILS # BLD: 0 K/UL (ref 0–0.2)
BASOPHILS NFR BLD: 0.5 %
BUN SERPL-MCNC: 13 MG/DL (ref 7–20)
CALCIUM SERPL-MCNC: 9.3 MG/DL (ref 8.3–10.6)
CHLORIDE SERPL-SCNC: 101 MMOL/L (ref 99–110)
CO2 SERPL-SCNC: 25 MMOL/L (ref 21–32)
CREAT SERPL-MCNC: 0.9 MG/DL (ref 0.6–1.1)
DEPRECATED RDW RBC AUTO: 15.1 % (ref 12.4–15.4)
EOSINOPHIL # BLD: 0.1 K/UL (ref 0–0.6)
EOSINOPHIL NFR BLD: 2 %
GFR SERPLBLD CREATININE-BSD FMLA CKD-EPI: 82 ML/MIN/{1.73_M2}
GLUCOSE BLD-MCNC: 151 MG/DL (ref 70–99)
GLUCOSE BLD-MCNC: 166 MG/DL (ref 70–99)
GLUCOSE BLD-MCNC: 178 MG/DL (ref 70–99)
GLUCOSE BLD-MCNC: 267 MG/DL (ref 70–99)
GLUCOSE SERPL-MCNC: 206 MG/DL (ref 70–99)
HCT VFR BLD AUTO: 37.3 % (ref 36–48)
HGB BLD-MCNC: 11.9 G/DL (ref 12–16)
LYMPHOCYTES # BLD: 1 K/UL (ref 1–5.1)
LYMPHOCYTES NFR BLD: 15.8 %
MCH RBC QN AUTO: 27.1 PG (ref 26–34)
MCHC RBC AUTO-ENTMCNC: 31.9 G/DL (ref 31–36)
MCV RBC AUTO: 85 FL (ref 80–100)
MONOCYTES # BLD: 0.5 K/UL (ref 0–1.3)
MONOCYTES NFR BLD: 8.5 %
NEUTROPHILS # BLD: 4.5 K/UL (ref 1.7–7.7)
NEUTROPHILS NFR BLD: 73.2 %
PERFORMED ON: ABNORMAL
PLATELET # BLD AUTO: 250 K/UL (ref 135–450)
PMV BLD AUTO: 8 FL (ref 5–10.5)
POTASSIUM SERPL-SCNC: 4.4 MMOL/L (ref 3.5–5.1)
RBC # BLD AUTO: 4.39 M/UL (ref 4–5.2)
SODIUM SERPL-SCNC: 136 MMOL/L (ref 136–145)
WBC # BLD AUTO: 6.2 K/UL (ref 4–11)

## 2024-10-04 PROCEDURE — 6360000002 HC RX W HCPCS: Performed by: INTERNAL MEDICINE

## 2024-10-04 PROCEDURE — 36415 COLL VENOUS BLD VENIPUNCTURE: CPT

## 2024-10-04 PROCEDURE — 80048 BASIC METABOLIC PNL TOTAL CA: CPT

## 2024-10-04 PROCEDURE — 6360000002 HC RX W HCPCS: Performed by: NURSE PRACTITIONER

## 2024-10-04 PROCEDURE — 99223 1ST HOSP IP/OBS HIGH 75: CPT | Performed by: PSYCHIATRY & NEUROLOGY

## 2024-10-04 PROCEDURE — 85025 COMPLETE CBC W/AUTO DIFF WBC: CPT

## 2024-10-04 PROCEDURE — 99231 SBSQ HOSP IP/OBS SF/LOW 25: CPT | Performed by: NURSE PRACTITIONER

## 2024-10-04 PROCEDURE — 6370000000 HC RX 637 (ALT 250 FOR IP): Performed by: NURSE PRACTITIONER

## 2024-10-04 PROCEDURE — 6370000000 HC RX 637 (ALT 250 FOR IP): Performed by: INTERNAL MEDICINE

## 2024-10-04 PROCEDURE — 85730 THROMBOPLASTIN TIME PARTIAL: CPT

## 2024-10-04 PROCEDURE — 85520 HEPARIN ASSAY: CPT

## 2024-10-04 PROCEDURE — 97535 SELF CARE MNGMENT TRAINING: CPT

## 2024-10-04 PROCEDURE — 1200000000 HC SEMI PRIVATE

## 2024-10-04 PROCEDURE — 51798 US URINE CAPACITY MEASURE: CPT

## 2024-10-04 RX ORDER — DIPHENHYDRAMINE HCL 25 MG
25 TABLET ORAL EVERY 6 HOURS
Status: DISCONTINUED | OUTPATIENT
Start: 2024-10-04 | End: 2024-10-07 | Stop reason: HOSPADM

## 2024-10-04 RX ADMIN — INSULIN LISPRO 10 UNITS: 100 INJECTION, SOLUTION INTRAVENOUS; SUBCUTANEOUS at 17:16

## 2024-10-04 RX ADMIN — TIZANIDINE 2 MG: 4 TABLET ORAL at 15:52

## 2024-10-04 RX ADMIN — HYDROMORPHONE HYDROCHLORIDE 0.25 MG: 1 INJECTION, SOLUTION INTRAMUSCULAR; INTRAVENOUS; SUBCUTANEOUS at 15:52

## 2024-10-04 RX ADMIN — TIZANIDINE 2 MG: 4 TABLET ORAL at 21:38

## 2024-10-04 RX ADMIN — MYCOPHENOLATE MOFETIL 1000 MG: 250 CAPSULE ORAL at 10:52

## 2024-10-04 RX ADMIN — TACROLIMUS 0.5 MG: 0.5 CAPSULE ORAL at 10:51

## 2024-10-04 RX ADMIN — DIPHENHYDRAMINE HYDROCHLORIDE 25 MG: 25 TABLET ORAL at 09:37

## 2024-10-04 RX ADMIN — PREGABALIN 25 MG: 25 CAPSULE ORAL at 15:52

## 2024-10-04 RX ADMIN — HEPARIN SODIUM 15 UNITS/KG/HR: 10000 INJECTION, SOLUTION INTRAVENOUS at 03:29

## 2024-10-04 RX ADMIN — INSULIN GLARGINE 20 UNITS: 100 INJECTION, SOLUTION SUBCUTANEOUS at 09:37

## 2024-10-04 RX ADMIN — CITALOPRAM HYDROBROMIDE 20 MG: 20 TABLET ORAL at 21:37

## 2024-10-04 RX ADMIN — OXYCODONE 10 MG: 5 TABLET ORAL at 21:38

## 2024-10-04 RX ADMIN — OXYCODONE 10 MG: 5 TABLET ORAL at 12:00

## 2024-10-04 RX ADMIN — HYDROMORPHONE HYDROCHLORIDE 0.25 MG: 1 INJECTION, SOLUTION INTRAMUSCULAR; INTRAVENOUS; SUBCUTANEOUS at 10:52

## 2024-10-04 RX ADMIN — TACROLIMUS 1 MG: 0.5 CAPSULE ORAL at 10:54

## 2024-10-04 RX ADMIN — AMLODIPINE BESYLATE 10 MG: 10 TABLET ORAL at 10:51

## 2024-10-04 RX ADMIN — FAMOTIDINE 20 MG: 20 TABLET, FILM COATED ORAL at 10:50

## 2024-10-04 RX ADMIN — MYCOPHENOLATE MOFETIL 1000 MG: 250 CAPSULE ORAL at 21:37

## 2024-10-04 RX ADMIN — OXYCODONE 10 MG: 5 TABLET ORAL at 17:22

## 2024-10-04 RX ADMIN — INSULIN GLARGINE 20 UNITS: 100 INJECTION, SOLUTION SUBCUTANEOUS at 21:41

## 2024-10-04 RX ADMIN — DIPHENHYDRAMINE HYDROCHLORIDE 25 MG: 25 TABLET ORAL at 04:59

## 2024-10-04 RX ADMIN — OXYCODONE HYDROCHLORIDE 5 MG: 5 TABLET ORAL at 04:59

## 2024-10-04 RX ADMIN — TACROLIMUS 2 MG: 0.5 CAPSULE ORAL at 21:37

## 2024-10-04 RX ADMIN — PREGABALIN 25 MG: 25 CAPSULE ORAL at 10:52

## 2024-10-04 RX ADMIN — DIPHENHYDRAMINE HYDROCHLORIDE 25 MG: 25 TABLET ORAL at 21:38

## 2024-10-04 RX ADMIN — HEPARIN SODIUM 15 UNITS/KG/HR: 10000 INJECTION, SOLUTION INTRAVENOUS at 18:18

## 2024-10-04 RX ADMIN — PREGABALIN 25 MG: 25 CAPSULE ORAL at 21:37

## 2024-10-04 RX ADMIN — ENTECAVIR 0.5 MG: 0.5 TABLET, FILM COATED ORAL at 10:55

## 2024-10-04 RX ADMIN — HYDROMORPHONE HYDROCHLORIDE 0.25 MG: 1 INJECTION, SOLUTION INTRAMUSCULAR; INTRAVENOUS; SUBCUTANEOUS at 03:30

## 2024-10-04 RX ADMIN — HEPARIN SODIUM 15 UNITS/KG/HR: 10000 INJECTION, SOLUTION INTRAVENOUS at 19:17

## 2024-10-04 RX ADMIN — HEPARIN SODIUM 15 UNITS/KG/HR: 10000 INJECTION, SOLUTION INTRAVENOUS at 07:20

## 2024-10-04 RX ADMIN — INSULIN LISPRO 10 UNITS: 100 INJECTION, SOLUTION INTRAVENOUS; SUBCUTANEOUS at 09:37

## 2024-10-04 RX ADMIN — INSULIN LISPRO 10 UNITS: 100 INJECTION, SOLUTION INTRAVENOUS; SUBCUTANEOUS at 12:22

## 2024-10-04 RX ADMIN — LISINOPRIL 20 MG: 20 TABLET ORAL at 10:58

## 2024-10-04 RX ADMIN — DIPHENHYDRAMINE HYDROCHLORIDE 25 MG: 25 TABLET ORAL at 15:52

## 2024-10-04 RX ADMIN — TIZANIDINE 2 MG: 4 TABLET ORAL at 10:51

## 2024-10-04 ASSESSMENT — PAIN DESCRIPTION - FREQUENCY
FREQUENCY: CONTINUOUS

## 2024-10-04 ASSESSMENT — PAIN SCALES - GENERAL
PAINLEVEL_OUTOF10: 5
PAINLEVEL_OUTOF10: 5
PAINLEVEL_OUTOF10: 7
PAINLEVEL_OUTOF10: 5
PAINLEVEL_OUTOF10: 5
PAINLEVEL_OUTOF10: 7
PAINLEVEL_OUTOF10: 4
PAINLEVEL_OUTOF10: 4
PAINLEVEL_OUTOF10: 5
PAINLEVEL_OUTOF10: 7
PAINLEVEL_OUTOF10: 6
PAINLEVEL_OUTOF10: 7
PAINLEVEL_OUTOF10: 5
PAINLEVEL_OUTOF10: 7

## 2024-10-04 ASSESSMENT — PAIN DESCRIPTION - LOCATION
LOCATION: LEG
LOCATION: BACK
LOCATION: LEG
LOCATION: BACK
LOCATION: BACK

## 2024-10-04 ASSESSMENT — PAIN DESCRIPTION - DIRECTION
RADIATING_TOWARDS: BILAT LEGS
RADIATING_TOWARDS: R LEG

## 2024-10-04 ASSESSMENT — PAIN DESCRIPTION - ONSET
ONSET: ON-GOING

## 2024-10-04 ASSESSMENT — PAIN DESCRIPTION - ORIENTATION
ORIENTATION: LOWER;RIGHT;LEFT
ORIENTATION: LOWER;RIGHT
ORIENTATION: LOWER
ORIENTATION: LOWER;RIGHT
ORIENTATION: LOWER
ORIENTATION: LOWER;RIGHT;LEFT

## 2024-10-04 ASSESSMENT — PAIN DESCRIPTION - DESCRIPTORS
DESCRIPTORS: ACHING
DESCRIPTORS: ACHING;DISCOMFORT
DESCRIPTORS: ACHING;THROBBING
DESCRIPTORS: ACHING

## 2024-10-04 ASSESSMENT — PAIN DESCRIPTION - PAIN TYPE: TYPE: ACUTE PAIN

## 2024-10-04 ASSESSMENT — PAIN - FUNCTIONAL ASSESSMENT
PAIN_FUNCTIONAL_ASSESSMENT: PREVENTS OR INTERFERES SOME ACTIVE ACTIVITIES AND ADLS

## 2024-10-04 NOTE — CARE COORDINATION
CM following: Pt is from home with her spouse and three children and has a cane at home. CM met with pt at bedside and pt has not yet received FMLA paperwork from employer - will email to CM once received - likely on Monday. CM will continue to follow for discharge planning.  Electronically signed by IRAIS Priest on 10/4/2024 at 2:55 PM  [phone]

## 2024-10-04 NOTE — CONSULTS
Neurology Consultation Note      Patient: Jasmina Hahn MRN: [id number]    YOB: 1980  Age: 44 y.o.  Sex: female   Unit: TJ 5T ORTHO/NEURO Room/Bed: 5515/5515-01 Location: [facility]    Date of Consultation: 10/4/2024  Date of Admission: 10/1/2024 11:45 PM ( LOS: 3 days )  Admitting Physician: HARRIET TRACEY    Primary Care Physician: Cassie Harman MD   Consult Requested By: Saturnino Diaz, APRN - CNP     Reason for Consult: \"Etiology of RLE Weakness that Dr. Benz does not feel is from L4/5 & L5/S1 issues\"    ASSESSMENT & RECOMMENDATIONS     Assessment & Recommendations:  45yo woman with progressive low back pain that has worsened substantially over the last year and has culminated in severe pain that radiates down the back of both legs, R>L and weakness in her right leg with apparent urinary incontinence  Although her imaging is not compelling enough to explain what she describes, her EMG from August (done at [place]) does suggest at least some degree of radiculopathy  However, there is no compelling historical nor objective evidence to support an alternate neurologic diagnosis as well  Neuromuscular disease will not cause the pain she describes, which is her main focus of complaint  There is no signal change in her cord to suggest pathology here, which also would not cause the pain she describes  She has intact reflexes and this eliminates acute inflammatory demyelinating polyneuropathy (AIDP), which would also not cause the pain she describes nor weakness in the pattern she describes  Her MRI brain shows some chronic white matter changes that, despite her young age, is not surprising given her history of diabetes (with A1c as high as 10.9 last year), HTN, and obesity   MRI changes do not represent demyelination (multiple sclerosis) and, again, MS will not give her the pain nor other symptoms she describes  Furthermore, no neurologic explanation for her

## 2024-10-05 LAB
ANTI-XA UNFRAC HEPARIN: 0.59 IU/ML (ref 0.3–0.7)
ANTI-XA UNFRAC HEPARIN: 0.59 IU/ML (ref 0.3–0.7)
BASOPHILS # BLD: 0 K/UL (ref 0–0.2)
BASOPHILS NFR BLD: 0.6 %
DEPRECATED RDW RBC AUTO: 15.1 % (ref 12.4–15.4)
EOSINOPHIL # BLD: 0.1 K/UL (ref 0–0.6)
EOSINOPHIL NFR BLD: 1.7 %
GLUCOSE BLD-MCNC: 116 MG/DL (ref 70–99)
GLUCOSE BLD-MCNC: 189 MG/DL (ref 70–99)
GLUCOSE BLD-MCNC: 193 MG/DL (ref 70–99)
GLUCOSE BLD-MCNC: 94 MG/DL (ref 70–99)
HCT VFR BLD AUTO: 36.1 % (ref 36–48)
HGB BLD-MCNC: 11.6 G/DL (ref 12–16)
LYMPHOCYTES # BLD: 1.1 K/UL (ref 1–5.1)
LYMPHOCYTES NFR BLD: 16.3 %
MCH RBC QN AUTO: 27.3 PG (ref 26–34)
MCHC RBC AUTO-ENTMCNC: 32.1 G/DL (ref 31–36)
MCV RBC AUTO: 84.9 FL (ref 80–100)
MONOCYTES # BLD: 0.6 K/UL (ref 0–1.3)
MONOCYTES NFR BLD: 9.3 %
NEUTROPHILS # BLD: 4.8 K/UL (ref 1.7–7.7)
NEUTROPHILS NFR BLD: 72.1 %
PERFORMED ON: ABNORMAL
PERFORMED ON: NORMAL
PLATELET # BLD AUTO: 249 K/UL (ref 135–450)
PMV BLD AUTO: 7.9 FL (ref 5–10.5)
RBC # BLD AUTO: 4.26 M/UL (ref 4–5.2)
WBC # BLD AUTO: 6.6 K/UL (ref 4–11)

## 2024-10-05 PROCEDURE — 6360000002 HC RX W HCPCS: Performed by: NURSE PRACTITIONER

## 2024-10-05 PROCEDURE — 85520 HEPARIN ASSAY: CPT

## 2024-10-05 PROCEDURE — 1200000000 HC SEMI PRIVATE

## 2024-10-05 PROCEDURE — 85025 COMPLETE CBC W/AUTO DIFF WBC: CPT

## 2024-10-05 PROCEDURE — 2580000003 HC RX 258: Performed by: INTERNAL MEDICINE

## 2024-10-05 PROCEDURE — 6370000000 HC RX 637 (ALT 250 FOR IP): Performed by: INTERNAL MEDICINE

## 2024-10-05 PROCEDURE — 6360000002 HC RX W HCPCS: Performed by: INTERNAL MEDICINE

## 2024-10-05 PROCEDURE — 36415 COLL VENOUS BLD VENIPUNCTURE: CPT

## 2024-10-05 PROCEDURE — 6370000000 HC RX 637 (ALT 250 FOR IP): Performed by: NURSE PRACTITIONER

## 2024-10-05 RX ADMIN — OXYCODONE 10 MG: 5 TABLET ORAL at 17:00

## 2024-10-05 RX ADMIN — OXYCODONE 10 MG: 5 TABLET ORAL at 03:52

## 2024-10-05 RX ADMIN — INSULIN GLARGINE 20 UNITS: 100 INJECTION, SOLUTION SUBCUTANEOUS at 20:53

## 2024-10-05 RX ADMIN — INSULIN LISPRO 10 UNITS: 100 INJECTION, SOLUTION INTRAVENOUS; SUBCUTANEOUS at 16:59

## 2024-10-05 RX ADMIN — TACROLIMUS 0.5 MG: 0.5 CAPSULE ORAL at 09:00

## 2024-10-05 RX ADMIN — DIAZEPAM 5 MG: 5 TABLET ORAL at 22:58

## 2024-10-05 RX ADMIN — HYDROMORPHONE HYDROCHLORIDE 0.25 MG: 1 INJECTION, SOLUTION INTRAMUSCULAR; INTRAVENOUS; SUBCUTANEOUS at 22:10

## 2024-10-05 RX ADMIN — INSULIN LISPRO 10 UNITS: 100 INJECTION, SOLUTION INTRAVENOUS; SUBCUTANEOUS at 08:48

## 2024-10-05 RX ADMIN — HEPARIN SODIUM 14.97 UNITS/KG/HR: 10000 INJECTION, SOLUTION INTRAVENOUS at 10:51

## 2024-10-05 RX ADMIN — TACROLIMUS 1 MG: 0.5 CAPSULE ORAL at 09:00

## 2024-10-05 RX ADMIN — TACROLIMUS 2 MG: 0.5 CAPSULE ORAL at 20:50

## 2024-10-05 RX ADMIN — POLYETHYLENE GLYCOL 3350 17 G: 17 POWDER, FOR SOLUTION ORAL at 09:06

## 2024-10-05 RX ADMIN — SODIUM CHLORIDE, PRESERVATIVE FREE 10 ML: 5 INJECTION INTRAVENOUS at 09:02

## 2024-10-05 RX ADMIN — HEPARIN SODIUM 14.97 UNITS/KG/HR: 10000 INJECTION, SOLUTION INTRAVENOUS at 17:10

## 2024-10-05 RX ADMIN — AMLODIPINE BESYLATE 10 MG: 10 TABLET ORAL at 08:50

## 2024-10-05 RX ADMIN — INSULIN GLARGINE 20 UNITS: 100 INJECTION, SOLUTION SUBCUTANEOUS at 08:46

## 2024-10-05 RX ADMIN — PREGABALIN 25 MG: 25 CAPSULE ORAL at 20:51

## 2024-10-05 RX ADMIN — PREGABALIN 25 MG: 25 CAPSULE ORAL at 08:52

## 2024-10-05 RX ADMIN — OXYCODONE 10 MG: 5 TABLET ORAL at 11:00

## 2024-10-05 RX ADMIN — LISINOPRIL 20 MG: 20 TABLET ORAL at 08:51

## 2024-10-05 RX ADMIN — OXYCODONE 10 MG: 5 TABLET ORAL at 20:50

## 2024-10-05 RX ADMIN — DIAZEPAM 5 MG: 5 TABLET ORAL at 03:52

## 2024-10-05 RX ADMIN — PREGABALIN 25 MG: 25 CAPSULE ORAL at 14:50

## 2024-10-05 RX ADMIN — TIZANIDINE 2 MG: 4 TABLET ORAL at 08:58

## 2024-10-05 RX ADMIN — MYCOPHENOLATE MOFETIL 1000 MG: 250 CAPSULE ORAL at 08:49

## 2024-10-05 RX ADMIN — FAMOTIDINE 20 MG: 20 TABLET, FILM COATED ORAL at 08:50

## 2024-10-05 RX ADMIN — DIPHENHYDRAMINE HYDROCHLORIDE 25 MG: 25 TABLET ORAL at 20:51

## 2024-10-05 RX ADMIN — CITALOPRAM HYDROBROMIDE 20 MG: 20 TABLET ORAL at 20:51

## 2024-10-05 RX ADMIN — HEPARIN SODIUM 15 UNITS/KG/HR: 10000 INJECTION, SOLUTION INTRAVENOUS at 22:54

## 2024-10-05 RX ADMIN — INSULIN LISPRO 10 UNITS: 100 INJECTION, SOLUTION INTRAVENOUS; SUBCUTANEOUS at 12:29

## 2024-10-05 RX ADMIN — MYCOPHENOLATE MOFETIL 1000 MG: 250 CAPSULE ORAL at 20:50

## 2024-10-05 RX ADMIN — DIPHENHYDRAMINE HYDROCHLORIDE 25 MG: 25 TABLET ORAL at 08:51

## 2024-10-05 RX ADMIN — TIZANIDINE 2 MG: 4 TABLET ORAL at 20:51

## 2024-10-05 RX ADMIN — TIZANIDINE 2 MG: 4 TABLET ORAL at 14:49

## 2024-10-05 RX ADMIN — HYDROMORPHONE HYDROCHLORIDE 0.25 MG: 1 INJECTION, SOLUTION INTRAMUSCULAR; INTRAVENOUS; SUBCUTANEOUS at 08:36

## 2024-10-05 RX ADMIN — HEPARIN SODIUM 15 UNITS/KG/HR: 10000 INJECTION, SOLUTION INTRAVENOUS at 07:23

## 2024-10-05 RX ADMIN — ENTECAVIR 0.5 MG: 0.5 TABLET, FILM COATED ORAL at 08:49

## 2024-10-05 RX ADMIN — DIPHENHYDRAMINE HYDROCHLORIDE 25 MG: 25 TABLET ORAL at 03:52

## 2024-10-05 RX ADMIN — DIPHENHYDRAMINE HYDROCHLORIDE 25 MG: 25 TABLET ORAL at 14:49

## 2024-10-05 ASSESSMENT — PAIN DESCRIPTION - ONSET
ONSET: ON-GOING

## 2024-10-05 ASSESSMENT — PAIN SCALES - GENERAL
PAINLEVEL_OUTOF10: 5
PAINLEVEL_OUTOF10: 7
PAINLEVEL_OUTOF10: 8
PAINLEVEL_OUTOF10: 5
PAINLEVEL_OUTOF10: 7
PAINLEVEL_OUTOF10: 5
PAINLEVEL_OUTOF10: 7
PAINLEVEL_OUTOF10: 7
PAINLEVEL_OUTOF10: 6
PAINLEVEL_OUTOF10: 4

## 2024-10-05 ASSESSMENT — PAIN DESCRIPTION - ORIENTATION
ORIENTATION: LOWER
ORIENTATION: RIGHT
ORIENTATION: LOWER
ORIENTATION: LOWER

## 2024-10-05 ASSESSMENT — PAIN DESCRIPTION - LOCATION
LOCATION: BACK
LOCATION: BACK;LEG
LOCATION: BACK
LOCATION: BACK

## 2024-10-05 ASSESSMENT — PAIN DESCRIPTION - PAIN TYPE
TYPE: ACUTE PAIN

## 2024-10-05 ASSESSMENT — PAIN DESCRIPTION - DIRECTION
RADIATING_TOWARDS: RIGHT LEG
RADIATING_TOWARDS: RIGHT LEG
RADIATING_TOWARDS: RIGHT

## 2024-10-05 ASSESSMENT — PAIN DESCRIPTION - DESCRIPTORS
DESCRIPTORS: THROBBING;ACHING
DESCRIPTORS: ACHING
DESCRIPTORS: ACHING;DULL;THROBBING
DESCRIPTORS: ACHING;DISCOMFORT

## 2024-10-05 ASSESSMENT — PAIN DESCRIPTION - FREQUENCY
FREQUENCY: CONTINUOUS

## 2024-10-06 LAB
ANTI-XA UNFRAC HEPARIN: 0.56 IU/ML (ref 0.3–0.7)
DEPRECATED RDW RBC AUTO: 15.1 % (ref 12.4–15.4)
GLUCOSE BLD-MCNC: 167 MG/DL (ref 70–99)
GLUCOSE BLD-MCNC: 191 MG/DL (ref 70–99)
GLUCOSE BLD-MCNC: 192 MG/DL (ref 70–99)
GLUCOSE BLD-MCNC: 257 MG/DL (ref 70–99)
HCT VFR BLD AUTO: 37 % (ref 36–48)
HGB BLD-MCNC: 11.8 G/DL (ref 12–16)
MCH RBC QN AUTO: 27.2 PG (ref 26–34)
MCHC RBC AUTO-ENTMCNC: 31.9 G/DL (ref 31–36)
MCV RBC AUTO: 85.2 FL (ref 80–100)
PERFORMED ON: ABNORMAL
PLATELET # BLD AUTO: 258 K/UL (ref 135–450)
PMV BLD AUTO: 8.1 FL (ref 5–10.5)
RBC # BLD AUTO: 4.35 M/UL (ref 4–5.2)
WBC # BLD AUTO: 7.4 K/UL (ref 4–11)

## 2024-10-06 PROCEDURE — 36415 COLL VENOUS BLD VENIPUNCTURE: CPT

## 2024-10-06 PROCEDURE — 6370000000 HC RX 637 (ALT 250 FOR IP): Performed by: NURSE PRACTITIONER

## 2024-10-06 PROCEDURE — 1200000000 HC SEMI PRIVATE

## 2024-10-06 PROCEDURE — 85520 HEPARIN ASSAY: CPT

## 2024-10-06 PROCEDURE — 2580000003 HC RX 258: Performed by: INTERNAL MEDICINE

## 2024-10-06 PROCEDURE — 6360000002 HC RX W HCPCS: Performed by: INTERNAL MEDICINE

## 2024-10-06 PROCEDURE — 85027 COMPLETE CBC AUTOMATED: CPT

## 2024-10-06 PROCEDURE — 6360000002 HC RX W HCPCS: Performed by: NURSE PRACTITIONER

## 2024-10-06 PROCEDURE — 6370000000 HC RX 637 (ALT 250 FOR IP): Performed by: INTERNAL MEDICINE

## 2024-10-06 RX ADMIN — SODIUM CHLORIDE, PRESERVATIVE FREE 10 ML: 5 INJECTION INTRAVENOUS at 09:12

## 2024-10-06 RX ADMIN — PREGABALIN 25 MG: 25 CAPSULE ORAL at 09:10

## 2024-10-06 RX ADMIN — FAMOTIDINE 20 MG: 20 TABLET, FILM COATED ORAL at 09:12

## 2024-10-06 RX ADMIN — DIPHENHYDRAMINE HYDROCHLORIDE 25 MG: 25 TABLET ORAL at 09:03

## 2024-10-06 RX ADMIN — TIZANIDINE 2 MG: 4 TABLET ORAL at 22:08

## 2024-10-06 RX ADMIN — HEPARIN SODIUM 14.97 UNITS/KG/HR: 10000 INJECTION, SOLUTION INTRAVENOUS at 11:27

## 2024-10-06 RX ADMIN — DIAZEPAM 5 MG: 5 TABLET ORAL at 23:05

## 2024-10-06 RX ADMIN — DIPHENHYDRAMINE HYDROCHLORIDE 25 MG: 25 TABLET ORAL at 04:29

## 2024-10-06 RX ADMIN — CITALOPRAM HYDROBROMIDE 20 MG: 20 TABLET ORAL at 22:09

## 2024-10-06 RX ADMIN — LISINOPRIL 20 MG: 20 TABLET ORAL at 09:11

## 2024-10-06 RX ADMIN — INSULIN GLARGINE 20 UNITS: 100 INJECTION, SOLUTION SUBCUTANEOUS at 22:10

## 2024-10-06 RX ADMIN — MYCOPHENOLATE MOFETIL 1000 MG: 250 CAPSULE ORAL at 22:09

## 2024-10-06 RX ADMIN — ENTECAVIR 0.5 MG: 0.5 TABLET, FILM COATED ORAL at 09:13

## 2024-10-06 RX ADMIN — PREGABALIN 25 MG: 25 CAPSULE ORAL at 15:21

## 2024-10-06 RX ADMIN — TIZANIDINE 2 MG: 4 TABLET ORAL at 09:10

## 2024-10-06 RX ADMIN — TIZANIDINE 2 MG: 4 TABLET ORAL at 15:22

## 2024-10-06 RX ADMIN — TACROLIMUS 1 MG: 0.5 CAPSULE ORAL at 09:09

## 2024-10-06 RX ADMIN — AMLODIPINE BESYLATE 10 MG: 10 TABLET ORAL at 09:10

## 2024-10-06 RX ADMIN — HYDROMORPHONE HYDROCHLORIDE 0.25 MG: 1 INJECTION, SOLUTION INTRAMUSCULAR; INTRAVENOUS; SUBCUTANEOUS at 09:01

## 2024-10-06 RX ADMIN — TACROLIMUS 0.5 MG: 0.5 CAPSULE ORAL at 09:08

## 2024-10-06 RX ADMIN — DIPHENHYDRAMINE HYDROCHLORIDE 25 MG: 25 TABLET ORAL at 15:21

## 2024-10-06 RX ADMIN — OXYCODONE 10 MG: 5 TABLET ORAL at 22:08

## 2024-10-06 RX ADMIN — OXYCODONE 10 MG: 5 TABLET ORAL at 04:29

## 2024-10-06 RX ADMIN — OXYCODONE 10 MG: 5 TABLET ORAL at 15:26

## 2024-10-06 RX ADMIN — PREGABALIN 25 MG: 25 CAPSULE ORAL at 22:09

## 2024-10-06 RX ADMIN — TACROLIMUS 2 MG: 0.5 CAPSULE ORAL at 22:09

## 2024-10-06 RX ADMIN — DIPHENHYDRAMINE HYDROCHLORIDE 25 MG: 25 TABLET ORAL at 22:07

## 2024-10-06 RX ADMIN — INSULIN LISPRO 10 UNITS: 100 INJECTION, SOLUTION INTRAVENOUS; SUBCUTANEOUS at 12:03

## 2024-10-06 RX ADMIN — OXYCODONE 10 MG: 5 TABLET ORAL at 11:33

## 2024-10-06 RX ADMIN — INSULIN GLARGINE 20 UNITS: 100 INJECTION, SOLUTION SUBCUTANEOUS at 09:06

## 2024-10-06 RX ADMIN — HYDROMORPHONE HYDROCHLORIDE 0.25 MG: 1 INJECTION, SOLUTION INTRAMUSCULAR; INTRAVENOUS; SUBCUTANEOUS at 23:05

## 2024-10-06 RX ADMIN — MYCOPHENOLATE MOFETIL 1000 MG: 250 CAPSULE ORAL at 09:28

## 2024-10-06 RX ADMIN — INSULIN LISPRO 10 UNITS: 100 INJECTION, SOLUTION INTRAVENOUS; SUBCUTANEOUS at 16:58

## 2024-10-06 RX ADMIN — INSULIN LISPRO 10 UNITS: 100 INJECTION, SOLUTION INTRAVENOUS; SUBCUTANEOUS at 09:06

## 2024-10-06 ASSESSMENT — PAIN DESCRIPTION - ONSET
ONSET: ON-GOING

## 2024-10-06 ASSESSMENT — PAIN SCALES - GENERAL
PAINLEVEL_OUTOF10: 7
PAINLEVEL_OUTOF10: 6
PAINLEVEL_OUTOF10: 7
PAINLEVEL_OUTOF10: 5
PAINLEVEL_OUTOF10: 7
PAINLEVEL_OUTOF10: 5
PAINLEVEL_OUTOF10: 7
PAINLEVEL_OUTOF10: 5
PAINLEVEL_OUTOF10: 7
PAINLEVEL_OUTOF10: 7
PAINLEVEL_OUTOF10: 5
PAINLEVEL_OUTOF10: 7

## 2024-10-06 ASSESSMENT — PAIN DESCRIPTION - DESCRIPTORS
DESCRIPTORS: ACHING;THROBBING
DESCRIPTORS: THROBBING
DESCRIPTORS: ACHING;THROBBING
DESCRIPTORS: THROBBING
DESCRIPTORS: DULL;THROBBING
DESCRIPTORS: THROBBING
DESCRIPTORS: ACHING

## 2024-10-06 ASSESSMENT — PAIN DESCRIPTION - ORIENTATION
ORIENTATION: MID;RIGHT
ORIENTATION: LOWER
ORIENTATION: RIGHT;LOWER
ORIENTATION: LOWER

## 2024-10-06 ASSESSMENT — PAIN DESCRIPTION - LOCATION
LOCATION: BACK

## 2024-10-06 ASSESSMENT — PAIN - FUNCTIONAL ASSESSMENT
PAIN_FUNCTIONAL_ASSESSMENT: PREVENTS OR INTERFERES SOME ACTIVE ACTIVITIES AND ADLS

## 2024-10-06 ASSESSMENT — PAIN DESCRIPTION - PAIN TYPE
TYPE: ACUTE PAIN

## 2024-10-06 ASSESSMENT — PAIN DESCRIPTION - DIRECTION
RADIATING_TOWARDS: LOWER
RADIATING_TOWARDS: LOWER

## 2024-10-06 ASSESSMENT — PAIN DESCRIPTION - FREQUENCY
FREQUENCY: CONTINUOUS

## 2024-10-07 VITALS
HEIGHT: 69 IN | BODY MASS INDEX: 43.4 KG/M2 | OXYGEN SATURATION: 97 % | RESPIRATION RATE: 16 BRPM | SYSTOLIC BLOOD PRESSURE: 125 MMHG | DIASTOLIC BLOOD PRESSURE: 83 MMHG | HEART RATE: 94 BPM | TEMPERATURE: 98.2 F | WEIGHT: 293 LBS

## 2024-10-07 LAB
ANION GAP SERPL CALCULATED.3IONS-SCNC: 11 MMOL/L (ref 3–16)
ANTI-XA UNFRAC HEPARIN: <0.1 IU/ML (ref 0.3–0.7)
BUN SERPL-MCNC: 11 MG/DL (ref 7–20)
CALCIUM SERPL-MCNC: 9.6 MG/DL (ref 8.3–10.6)
CHLORIDE SERPL-SCNC: 100 MMOL/L (ref 99–110)
CO2 SERPL-SCNC: 23 MMOL/L (ref 21–32)
CREAT SERPL-MCNC: 1 MG/DL (ref 0.6–1.1)
GFR SERPLBLD CREATININE-BSD FMLA CKD-EPI: 72 ML/MIN/{1.73_M2}
GLUCOSE BLD-MCNC: 198 MG/DL (ref 70–99)
GLUCOSE BLD-MCNC: 206 MG/DL (ref 70–99)
GLUCOSE SERPL-MCNC: 186 MG/DL (ref 70–99)
INR PPP: 0.92 (ref 0.85–1.15)
PERFORMED ON: ABNORMAL
PERFORMED ON: ABNORMAL
POTASSIUM SERPL-SCNC: 4.3 MMOL/L (ref 3.5–5.1)
PROTHROMBIN TIME: 12.6 SEC (ref 11.9–14.9)
SODIUM SERPL-SCNC: 134 MMOL/L (ref 136–145)

## 2024-10-07 PROCEDURE — 6370000000 HC RX 637 (ALT 250 FOR IP): Performed by: INTERNAL MEDICINE

## 2024-10-07 PROCEDURE — 80048 BASIC METABOLIC PNL TOTAL CA: CPT

## 2024-10-07 PROCEDURE — 6360000002 HC RX W HCPCS: Performed by: NURSE PRACTITIONER

## 2024-10-07 PROCEDURE — 6360000002 HC RX W HCPCS: Performed by: INTERNAL MEDICINE

## 2024-10-07 PROCEDURE — 97116 GAIT TRAINING THERAPY: CPT

## 2024-10-07 PROCEDURE — 36415 COLL VENOUS BLD VENIPUNCTURE: CPT

## 2024-10-07 PROCEDURE — 97530 THERAPEUTIC ACTIVITIES: CPT

## 2024-10-07 PROCEDURE — 85610 PROTHROMBIN TIME: CPT

## 2024-10-07 PROCEDURE — 6370000000 HC RX 637 (ALT 250 FOR IP): Performed by: NURSE PRACTITIONER

## 2024-10-07 PROCEDURE — 99231 SBSQ HOSP IP/OBS SF/LOW 25: CPT | Performed by: NURSE PRACTITIONER

## 2024-10-07 PROCEDURE — 85520 HEPARIN ASSAY: CPT

## 2024-10-07 PROCEDURE — 2580000003 HC RX 258: Performed by: INTERNAL MEDICINE

## 2024-10-07 RX ORDER — SODIUM CHLORIDE 0.9 % (FLUSH) 0.9 %
5-40 SYRINGE (ML) INJECTION EVERY 12 HOURS SCHEDULED
Status: CANCELLED | OUTPATIENT
Start: 2024-10-07

## 2024-10-07 RX ORDER — SODIUM CHLORIDE 0.9 % (FLUSH) 0.9 %
5-40 SYRINGE (ML) INJECTION PRN
Status: CANCELLED | OUTPATIENT
Start: 2024-10-07

## 2024-10-07 RX ORDER — TIZANIDINE 2 MG/1
2 TABLET ORAL 3 TIMES DAILY
Qty: 30 TABLET | Refills: 0 | Status: SHIPPED | OUTPATIENT
Start: 2024-10-07

## 2024-10-07 RX ORDER — OXYCODONE HYDROCHLORIDE 5 MG/1
5 TABLET ORAL EVERY 6 HOURS PRN
Qty: 12 TABLET | Refills: 0 | Status: SHIPPED | OUTPATIENT
Start: 2024-10-07 | End: 2024-10-10

## 2024-10-07 RX ORDER — SODIUM CHLORIDE, SODIUM LACTATE, POTASSIUM CHLORIDE, CALCIUM CHLORIDE 600; 310; 30; 20 MG/100ML; MG/100ML; MG/100ML; MG/100ML
INJECTION, SOLUTION INTRAVENOUS CONTINUOUS
Status: CANCELLED | OUTPATIENT
Start: 2024-10-07

## 2024-10-07 RX ORDER — LIDOCAINE HYDROCHLORIDE 10 MG/ML
1 INJECTION, SOLUTION EPIDURAL; INFILTRATION; INTRACAUDAL; PERINEURAL
Status: CANCELLED | OUTPATIENT
Start: 2024-10-07 | End: 2024-10-08

## 2024-10-07 RX ADMIN — OXYCODONE 10 MG: 5 TABLET ORAL at 13:24

## 2024-10-07 RX ADMIN — AMLODIPINE BESYLATE 10 MG: 10 TABLET ORAL at 09:26

## 2024-10-07 RX ADMIN — HYDROMORPHONE HYDROCHLORIDE 0.25 MG: 1 INJECTION, SOLUTION INTRAMUSCULAR; INTRAVENOUS; SUBCUTANEOUS at 05:18

## 2024-10-07 RX ADMIN — PREGABALIN 25 MG: 25 CAPSULE ORAL at 13:24

## 2024-10-07 RX ADMIN — OXYCODONE 10 MG: 5 TABLET ORAL at 07:50

## 2024-10-07 RX ADMIN — DIAZEPAM 5 MG: 5 TABLET ORAL at 17:43

## 2024-10-07 RX ADMIN — PREGABALIN 25 MG: 25 CAPSULE ORAL at 09:25

## 2024-10-07 RX ADMIN — OXYCODONE 10 MG: 5 TABLET ORAL at 02:22

## 2024-10-07 RX ADMIN — HEPARIN SODIUM 14.97 UNITS/KG/HR: 10000 INJECTION, SOLUTION INTRAVENOUS at 02:28

## 2024-10-07 RX ADMIN — TACROLIMUS 0.5 MG: 0.5 CAPSULE ORAL at 09:25

## 2024-10-07 RX ADMIN — DIPHENHYDRAMINE HYDROCHLORIDE 25 MG: 25 TABLET ORAL at 07:50

## 2024-10-07 RX ADMIN — SODIUM CHLORIDE, PRESERVATIVE FREE 10 ML: 5 INJECTION INTRAVENOUS at 09:28

## 2024-10-07 RX ADMIN — TIZANIDINE 2 MG: 4 TABLET ORAL at 13:24

## 2024-10-07 RX ADMIN — ENTECAVIR 0.5 MG: 0.5 TABLET, FILM COATED ORAL at 09:42

## 2024-10-07 RX ADMIN — INSULIN GLARGINE 20 UNITS: 100 INJECTION, SOLUTION SUBCUTANEOUS at 09:26

## 2024-10-07 RX ADMIN — TIZANIDINE 2 MG: 4 TABLET ORAL at 09:26

## 2024-10-07 RX ADMIN — LISINOPRIL 20 MG: 20 TABLET ORAL at 09:26

## 2024-10-07 RX ADMIN — MYCOPHENOLATE MOFETIL 1000 MG: 250 CAPSULE ORAL at 09:26

## 2024-10-07 RX ADMIN — TACROLIMUS 1 MG: 0.5 CAPSULE ORAL at 09:41

## 2024-10-07 RX ADMIN — APIXABAN 5 MG: 5 TABLET, FILM COATED ORAL at 11:54

## 2024-10-07 RX ADMIN — INSULIN LISPRO 10 UNITS: 100 INJECTION, SOLUTION INTRAVENOUS; SUBCUTANEOUS at 11:54

## 2024-10-07 RX ADMIN — DIPHENHYDRAMINE HYDROCHLORIDE 25 MG: 25 TABLET ORAL at 02:23

## 2024-10-07 RX ADMIN — FAMOTIDINE 20 MG: 20 TABLET, FILM COATED ORAL at 09:26

## 2024-10-07 RX ADMIN — DIPHENHYDRAMINE HYDROCHLORIDE 25 MG: 25 TABLET ORAL at 13:24

## 2024-10-07 ASSESSMENT — PAIN DESCRIPTION - DIRECTION: RADIATING_TOWARDS: LOWER

## 2024-10-07 ASSESSMENT — PAIN DESCRIPTION - PAIN TYPE
TYPE: ACUTE PAIN

## 2024-10-07 ASSESSMENT — PAIN DESCRIPTION - ORIENTATION
ORIENTATION: LOWER
ORIENTATION: RIGHT;MID
ORIENTATION: LOWER

## 2024-10-07 ASSESSMENT — PAIN SCALES - GENERAL
PAINLEVEL_OUTOF10: 7
PAINLEVEL_OUTOF10: 5
PAINLEVEL_OUTOF10: 0
PAINLEVEL_OUTOF10: 2
PAINLEVEL_OUTOF10: 8
PAINLEVEL_OUTOF10: 4

## 2024-10-07 ASSESSMENT — PAIN DESCRIPTION - LOCATION
LOCATION: BACK
LOCATION: BACK;BUTTOCKS

## 2024-10-07 ASSESSMENT — PAIN DESCRIPTION - DESCRIPTORS
DESCRIPTORS: THROBBING
DESCRIPTORS: ACHING;SHARP
DESCRIPTORS: ACHING;DULL

## 2024-10-07 ASSESSMENT — PAIN - FUNCTIONAL ASSESSMENT
PAIN_FUNCTIONAL_ASSESSMENT: PREVENTS OR INTERFERES SOME ACTIVE ACTIVITIES AND ADLS

## 2024-10-07 ASSESSMENT — PAIN DESCRIPTION - FREQUENCY
FREQUENCY: CONTINUOUS

## 2024-10-07 ASSESSMENT — PAIN DESCRIPTION - ONSET
ONSET: ON-GOING

## 2024-10-07 NOTE — PROGRESS NOTES
NEUROSURGERY PROGRESS NOTE    10/7/2024 10:12 AM                               Jasmina Hahn                      LOS: 6 days     Subjective: No acute events overnight. Patient neuro exam improved from last Friday.    Physical Exam:  Patient seen and examined    Vitals:    10/07/24 0915   BP: (!) 146/88   Pulse: 97   Resp: 16   Temp: 98.2 °F (36.8 °C)   SpO2: 97%     GCS:  4 - Opens eyes on own  5 - Alert and oriented  6 - Follows simple motor commands  General: Well developed. Alert and cooperative in no acute distress.     HENT: atraumatic, neck supple  Eyes: Optic discs: Not tested  Pulmonary: unlabored respiratory effort  Cardiovascular:  Warm well perfused. No peripheral edema  Gastrointestinal: abdomen soft, NT, ND     Neurological:  Mental Status: Awake, alert, oriented x 4, speech clear and appropriate  Attention: Intact  Language: No aphasia or dysarthria noted  Sensation: Intact to all extremities to light touch  Coordination: Intact  DTRs:     Right  Left    Garner's Neg Neg   ankle clonus  Neg Neg   toes (babinski)  Down Down      Musculoskeletal:   Gait: Not tested   Assist devices: None   Tone: Normal  Motor strength: RUE exam limited 2/2 Positive empty can test and crossarm test consistent with rotator cuff impingement per Vincenzo Antonio, APRN-CNP. RLE exam limited 2/2 back pain and knee pain    Right  Left      Right  Left    Deltoid  4 5   Hip Flex  4+ 5   Biceps  4+ 5   Knee Extensors  4+ 5   Triceps  4 5   Knee Flexors  4+ 5   Wrist Ext  5 5   Ankle Dorsiflex.  5 5   Wrist Flex  5 5   Ankle Plantarflex.  4+ 5   Handgrip  5 5   Ext Wilber Longus  5 5   Thumb Ext  5 5              Radiological Findings:  MRI BRAIN WO CONTRAST  Result Date: 10/2/2024  1. Stable MRI of the brain compared to 7/25/2024 at acute intracranial abnormality  2. Stable mild to moderate amount of nonspecific cerebral white matter FLAIR hyperintensity, predominantly subcortical. Differential considerations include, but not 
    V2.0    [facility] [place] Progress Note      Name:  Jasmina Hahn /Age/Sex: 1980  (44 y.o. female)   MRN & CSN:  [id number] & [id number] Encounter Date/Time: 10/2/2024 9:30 AM EDT   Location:  5515/5515-01 PCP: Cassie Harman MD     Attending:Bishop Vázquez MD       Hospital Day: 2    Assessment and Recommendations   Jasmina Hahn is a 44 y.o. female who presented to the emergency room with complaints of increasing lumbar pain and intermittent urinary incontinence.    Patient was being seen by neurology as an outpatient.  Patient had complaints of right lower extremity weakness and bilateral lower extremity numbness.  MRI of the L-spine demonstrated disc herniation at L3-L4, large central and left foraminal disc protrusion at L5-S1, high-grade narrowing of the left neural foramen and subarticular space, L4-L5 disc protrusion causing moderate to high-grade foraminal narrowing and right paracentral extruded disc fragment at L4-L5.  Patient was transferred to [facility] for neurosurgery consult.      Plan:   Intractable back pain-Due MRI findings as above.  Neurosurgery consulted.  Hypertension- Continue norvasc and lisinopril.  DM 2- continue lantus and sliding scale  History of kidney transplant- continue cellcept and proGRAF  Hx of PE- heparin drip  Hx of HBV- Entecavir   Rotator cuff impingement-patient with complaints of right arm weakness.  Positive empty can test and crossarm test consistent with rotator cuff impingement.  This is the most likely reason for the patient's complaint of right upper extremity weakness.  Recommend outpatient follow-up with orthopedics.      Diet ADULT DIET; Regular; No Pork   DVT Prophylaxis [] Lovenox, []  Heparin, [] SCDs, [] Ambulation,  [] Eliquis, [] Xarelto  [] Coumadin   Code Status Full Code   Disposition From: home  Expected Disposition: home vs SNF  Estimated Date of Discharge: TBD  Patient requires continued admission due to intractable lumbar pain and 
    V2.0    [facility] [place] Progress Note      Name:  Jasmina Hahn /Age/Sex: 1980  (44 y.o. female)   MRN & CSN:  [id number] & [id number] Encounter Date/Time: 10/5/2024 9:30 AM EDT   Location:  5515/5515-01 PCP: Cassie Harman MD     Attending:Bishop Vázquez MD       Hospital Day: 5    Assessment and Recommendations   Jasmina Hahn is a 44 y.o. female who presented to the emergency room with complaints of increasing lumbar pain and intermittent urinary incontinence.    Patient was being seen by neurology as an outpatient.  Patient had complaints of right lower extremity weakness and bilateral lower extremity numbness.  MRI of the L-spine demonstrated disc herniation at L3-L4, large central and left foraminal disc protrusion at L5-S1, high-grade narrowing of the left neural foramen and subarticular space, L4-L5 disc protrusion causing moderate to high-grade foraminal narrowing and right paracentral extruded disc fragment at L4-L5.  Patient was transferred to [facility] for neurosurgery consult.      Plan:   Intractable back pain-Due MRI findings as above.  Neurosurgery consulted.  Hypertension- Continue norvasc and lisinopril.  DM 2- continue lantus and sliding scale  History of kidney transplant- continue cellcept and proGRAF  Hx of PE- heparin drip  Hx of HBV- Entecavir   Rotator cuff impingement-patient with complaints of right arm weakness.  Positive empty can test and crossarm test consistent with rotator cuff impingement.  This is the most likely reason for the patient's complaint of right upper extremity weakness.  Recommend outpatient follow-up with orthopedics.    Patient reports she was not told by neurosurgery that she would not have surgery on Tuesday. She has opted to stay and have ROBERTO CARLOS on Monday. I ask neurosurgery to see her on Monday as well.   Diet ADULT DIET; Regular; 4 carb choices (60 gm/meal); No Pork   DVT Prophylaxis [] Lovenox, []  Heparin, [] SCDs, [] Ambulation,  [] Eliquis, 
    V2.0    [facility] [place] Progress Note      Name:  Jasmina Hahn /Age/Sex: 1980  (44 y.o. female)   MRN & CSN:  [id number] & [id number] Encounter Date/Time: 10/3/2024 9:30 AM EDT   Location:  5515/5515-01 PCP: Cassie Harman MD     Attending:Bishop Vázquez MD       Hospital Day: 3    Assessment and Recommendations   Jasmina Hahn is a 44 y.o. female who presented to the emergency room with complaints of increasing lumbar pain and intermittent urinary incontinence.    Patient was being seen by neurology as an outpatient.  Patient had complaints of right lower extremity weakness and bilateral lower extremity numbness.  MRI of the L-spine demonstrated disc herniation at L3-L4, large central and left foraminal disc protrusion at L5-S1, high-grade narrowing of the left neural foramen and subarticular space, L4-L5 disc protrusion causing moderate to high-grade foraminal narrowing and right paracentral extruded disc fragment at L4-L5.  Patient was transferred to [facility] for neurosurgery consult.      Plan:   Intractable back pain-Due MRI findings as above.  Neurosurgery consulted.  Hypertension- Continue norvasc and lisinopril.  DM 2- continue lantus and sliding scale  History of kidney transplant- continue cellcept and proGRAF  Hx of PE- heparin drip  Hx of HBV- Entecavir   Rotator cuff impingement-patient with complaints of right arm weakness.  Positive empty can test and crossarm test consistent with rotator cuff impingement.  This is the most likely reason for the patient's complaint of right upper extremity weakness.  Recommend outpatient follow-up with orthopedics.    No acute changes overnight.  Await final neurosurgical plan.  Diet ADULT DIET; Regular; 4 carb choices (60 gm/meal); No Pork   DVT Prophylaxis [] Lovenox, []  Heparin, [] SCDs, [] Ambulation,  [] Eliquis, [] Xarelto  [] Coumadin   Code Status Full Code   Disposition From: home  Expected Disposition: home vs SNF  Estimated Date of 
    V2.0    [facility]      Name:  Jasmina Hahn /Age/Sex: 1980  (44 y.o. female)   MRN & CSN:  [id number] & [id number] Encounter Date/Time: 10/6/2024 9:30 AM EDT   Location:  5515/5515-01 PCP: Cassie Harman MD     Attending:Bishop Vázquez MD       Hospital Day: 6    Assessment and Recommendations   Jasmina Hahn is a 44 y.o. female who presented to the emergency room with complaints of increasing lumbar pain and intermittent urinary incontinence.    Patient was being seen by neurology as an outpatient.  Patient had complaints of right lower extremity weakness and bilateral lower extremity numbness.  MRI of the L-spine demonstrated disc herniation at L3-L4, large central and left foraminal disc protrusion at L5-S1, high-grade narrowing of the left neural foramen and subarticular space, L4-L5 disc protrusion causing moderate to high-grade foraminal narrowing and right paracentral extruded disc fragment at L4-L5.  Patient was transferred to [facility] for neurosurgery consult.      Plan:   Intractable back pain-Due MRI findings as above.  Neurosurgery consulted.  Hypertension- Continue norvasc and lisinopril.  DM 2- continue lantus and sliding scale  History of kidney transplant- continue cellcept and proGRAF  Hx of PE- heparin drip  Hx of HBV- Entecavir   Rotator cuff impingement-patient with complaints of right arm weakness.  Positive empty can test and crossarm test consistent with rotator cuff impingement.  This is the most likely reason for the patient's complaint of right upper extremity weakness.  Recommend outpatient follow-up with orthopedics.    Patient reports she was not told by neurosurgery that she would not have surgery on Tuesday. She has opted to stay and have ROBERTO CARLOS on Monday. I will ask neurosurgery to see her on Monday as well.   Diet ADULT DIET; Regular; 4 carb choices (60 gm/meal); No Pork  Diet NPO   DVT Prophylaxis [] Lovenox, []  Heparin, [] SCDs, [] Ambulation, 
    V2.0    [facility] [place] Progress Note      Name:  Jasmina Hahn /Age/Sex: 1980  (44 y.o. female)   MRN & CSN:  [id number] & [id number] Encounter Date/Time: 10/4/2024 9:30 AM EDT   Location:  5515/5515-01 PCP: Cassie Harman MD     Attending:Bishpo Vázquez MD       Hospital Day: 4    Assessment and Recommendations   Jasmina Hahn is a 44 y.o. female who presented to the emergency room with complaints of increasing lumbar pain and intermittent urinary incontinence.    Patient was being seen by neurology as an outpatient.  Patient had complaints of right lower extremity weakness and bilateral lower extremity numbness.  MRI of the L-spine demonstrated disc herniation at L3-L4, large central and left foraminal disc protrusion at L5-S1, high-grade narrowing of the left neural foramen and subarticular space, L4-L5 disc protrusion causing moderate to high-grade foraminal narrowing and right paracentral extruded disc fragment at L4-L5.  Patient was transferred to [facility] for neurosurgery consult.      Plan:   Intractable back pain-Due MRI findings as above.  Neurosurgery consulted.  Hypertension- Continue norvasc and lisinopril.  DM 2- continue lantus and sliding scale  History of kidney transplant- continue cellcept and proGRAF  Hx of PE- heparin drip  Hx of HBV- Entecavir   Rotator cuff impingement-patient with complaints of right arm weakness.  Positive empty can test and crossarm test consistent with rotator cuff impingement.  This is the most likely reason for the patient's complaint of right upper extremity weakness.  Recommend outpatient follow-up with orthopedics.      Diet ADULT DIET; Regular; 4 carb choices (60 gm/meal); No Pork   DVT Prophylaxis [] Lovenox, []  Heparin, [] SCDs, [] Ambulation,  [] Eliquis, [] Xarelto  [] Coumadin   Code Status Full Code   Disposition From: home  Expected Disposition: home vs SNF  Estimated Date of Discharge: TBD  Patient requires continued admission due to 
4 Eyes Skin Assessment     NAME:  Jasmina Hahn  YOB: 1980  MEDICAL RECORD NUMBER:  [id number]    The patient is being assessed for  Admission    I agree that at least one RN has performed a thorough Head to Toe Skin Assessment on the patient. ALL assessment sites listed below have been assessed.      Areas assessed by both nurses:    Head, Face, Ears, Shoulders, Back, Chest, Arms, Elbows, Hands, Sacrum. Buttock, Coccyx, Ischium, and Legs. Feet and Heels        Does the Patient have a Wound? No noted wound(s)  - rash on neck       Navdeep Prevention initiated by RN: No  Wound Care Orders initiated by RN: No    Pressure Injury (Stage 3,4, Unstageable, DTI, NWPT, and Complex wounds) if present, place Wound referral order by RN under : No    New Ostomies, if present place, Ostomy referral order under : No     Nurse 1 eSignature: Electronically signed by Meagan Ramesh RN on 10/2/24 at 4:09 AM EDT    **SHARE this note so that the co-signing nurse can place an eSignature**    Nurse 2 eSignature: {Esignature:[id number]}   
A&Ox4. Endorses pain to back and legs, medicated with PRN oxycodone and valium per MAR. Continuous heparin drip infusing at 15units/kg/hr, therapeutic anti-XA x3. VSS on room air. Poor appetite, however denies N/V. Standard safety measures in place.   
A&Ox4. Pain an on-going issue, managed with PRN medications per MAR. Heparin drip infusing and therapeutic. VSS on room air. Tolerates regular diet, poor PO at times but improved this shift. Up x1 standby. Standard safety measures in place.   
Anti-XA therapeutic x3. Lab draw changed to daily draws. Heparin continues at 20.1ml (15 units/kg/hr). No s/s bleeding. Pt tolerating well. VSS. WCTM.   
Occupational Therapy  Facility/Department: [facility] ORTHO/NEURO  Occupational Therapy Initial Assessment and Treatment Note    Name: Jasmina Hahn  : 1980  MRN: [id number]  Date of Service: 10/3/2024    Discharge Recommendations:  IP Rehab vs home with 24hr assist  DME Recommendations: sock aid, reacher       Patient Diagnosis(es): The encounter diagnosis was Deep vein thrombosis (DVT) of proximal vein of right lower extremity, unspecified chronicity (HCC).  Past Medical History:  has a past medical history of Anemia, Chronic kidney disease, Diabetes mellitus (HCC), Hypertension, and Obesity.  Past Surgical History:  has a past surgical history that includes Cholecystectomy;  section (,,); Umbilical hernia repair; Tubal ligation (); Hand tendon surgery; Dialysis fistula creation; and Endoscopy, colon, diagnostic (2018).    Treatment Diagnosis: decreased functional mobility, decreased funtional ADLs, decreased endurance      Assessment  Performance deficits / Impairments: Decreased functional mobility ;Decreased ADL status;Decreased strength;Decreased endurance  Assessment: Pt from home with  and children. Pt demo functioning below stated baseline. Pt has received increased assistance at home 2/2 pain and difficulty with mobility/ADLs. Limited by pain in back and BLE. Pt needed Max A for LB dressing. Pt educated on reacher/sock aid - continued education to demo use needed. Pt desires to be more active and exercise to gain strength. Neurosx conulted and determining medical plan of care. Recommend home with 24hr assist vs ARU at d/c to maximize functional independence. Will follow as inpt.  Treatment Diagnosis: decreased functional mobility, decreased funtional ADLs, decreased endurance  Decision Making: Medium Complexity  REQUIRES OT FOLLOW-UP: Yes  Activity Tolerance  Activity Tolerance: Patient Tolerated treatment well;Patient limited by pain  Activity Tolerance 
Patient back in room via transport.   
Patient is A&Ox 4. VSS this shift with exception of elevated HR/ BP. Patient has endorsed pain to lower back/ R leg managed well per MAR and non-pharm measures. Patient is tolerating PO diet. Tolerating ambulation x 1 assist stand/pivot. Voiding via bedside commode. Patient updated on plan of care. Fall and safety precautions in place, call light within reach.   
Patient is A/Ox4. VSS, on RA. No acute neuro changes throughout shift. Still endorsing lower back pain with numbness/tingling radiating down BLE. Pain controlled with PRN medications per the MAR, heat packs, and repositioning with pillow/wedge support. Reports itchiness with PO oxy. PRN benadryl administered as available.     Heparin gtt infusing through left AC PIV without any complications.   Tolerating diet well, denies n/v. Voiding without any issues, no episodes of incontinence. Had 1 BM this shift.   Ambulating x1 assist with walker and gait belt. Fall precautions in place. Bed locked in lowest position with alarm on. Call light within reach and patient using appropriately. Plan of care continues.   
Patient off the unit for X-ray via transport.   
Patient off unit to MRI via transport.   
Patient's spouse brought in home dose of Entecavir. Medication documented with pharmacist and placed in cart meds.   
Peripheral IV (22G 1.75\") successfully placed with ultrasound guidance to right forearm after 2 attempts. Flushed with normal saline. Blood return noted. Line capped. Patient tolerated well.    
Pharmacy Progress Note    Heparin high dose weight-based infusion has been titrated based on aPTT due to recent administration on Eliquis (last dose 10/1 in am), which interferes with Anti-Xa monitoring for hepairn    As patient has now been off Eliquis x 72 hours, will modify heparin orders to adjust rate based on AntiXa levels    Please call with any questions    Emeli Bravo.D. BCPS  [phone] (Main Pharmacy)      
Physical Therapy  Facility/Department: [facility] ORTHO/NEURO  Physical Therapy Treatment    Name: Jasmina Hahn  : 1980  MRN: [id number]  Date of Service: 10/7/2024    Discharge Recommendations:  24 hour supervision or assist   PT Equipment Recommendations  Equipment Needed: No      Patient Diagnosis(es): The primary encounter diagnosis was Deep vein thrombosis (DVT) of proximal vein of right lower extremity, unspecified chronicity (HCC). A diagnosis of Lumbar radiculopathy was also pertinent to this visit.  Past Medical History:  has a past medical history of Anemia, Chronic kidney disease, Diabetes mellitus (HCC), Hypertension, and Obesity.  Past Surgical History:  has a past surgical history that includes Cholecystectomy;  section (,,); Umbilical hernia repair; Tubal ligation (); Hand tendon surgery; Dialysis fistula creation; and Endoscopy, colon, diagnostic (2018).    Assessment  Body Structures, Functions, Activity Limitations Requiring Skilled Therapeutic Intervention: Decreased functional mobility ;Decreased ROM;Decreased strength;Decreased endurance;Decreased balance;Increased pain;Decreased posture  Assessment: Pt with improved functional mobility and endurance this session. Pt supervision for bed mobility and transfers and SBA for amb without AD. Pt continues to be limited by pain. Pt remains below her baseline and would benefit from further skilled PT to maximize safety and independence with functional mobility. Will continue to follow.  Treatment Diagnosis: decreased functional mobility due to lumbar radiculopathy  Barriers to Learning: none  Activity Tolerance  Activity Tolerance: Patient tolerated treatment well    Plan  Physical Therapy Plan  General Plan:  (2-5)  Current Treatment Recommendations: Strengthening, ROM, Balance training, Functional mobility training, Transfer training, Endurance training, Gait training, Stair training, Neuromuscular re-education, 
Physical Therapy/Occupational Therapy  Attempt  Chart review completed.  Attempted to see pt at 1143 on 10/3/24.  Per RN, pt is requesting PT/OT return after lunch so she is able to receive pain medicine prior to session.  Will re-attempt as schedule allows.    Jina Tate, PT      
Pt back on unit from MRI via transport.   
Pt discharge paperwork reviewed with patient and family. No questions at this time. Pt belongings gathered and sent with patient. PIV removed with no complications. Pt going home via husbands vehicle.   
Pt is A/Ox4, VSS on room air, and x1 walker/gb. Pt is voiding via bathroom privileges, tolerating food and fluids, and pain is being managed with PRN and scheduled pain meds per MAR. All safety precautions in place, call light within reach, plan of care continues.   
Pt's aPTT came back therapeutic a second time with heparin dose at 15 units/kg/hr. First was 94.5, and now 94.1. No rate change at this time.   
VSS stable on room air. Pt is A+O x4. Pt voiding via BRP, no BM this shift. Toleraing PO fluids/diet. Ambulates x1 GB and walker. Pain controlled per MAR. Heparin infusing with no complications. All fall precautions in place.    
VSS stable on room air. Pt is A+O x4. Pt voiding via BRP, no BM this shift. Toleraing PO fluids/diet. Ambulates x1 GB and walker. Pain controlled per MAR. Heparin infusing with no complications. All fall precautions in place. No neuro changes.                  
VSS, afebrile. Alert and oriented. PRN pain medication given with relief noted. Heparin gtt infusing per MAR. No acute events overnight. All fall & safety precautions in place. Call light within reach. Continue current plan of care.    
  Handgrip  4+ 5   Ext Wilber Longus  5 5   Thumb Ext  4+ 5              Radiological Findings:  MRI BRAIN WO CONTRAST  Result Date: 10/2/2024  1. Stable MRI of the brain compared to 7/25/2024 at acute intracranial abnormality  2. Stable mild to moderate amount of nonspecific cerebral white matter FLAIR hyperintensity, predominantly subcortical. Differential considerations include, but not limited to, chronic small vessel ischemia advanced for age, migraine headache syndrome, chronic inflammatory or less likely demyelinating disease. No signal abnormality specifically suspicious for multiple sclerosis, but and MS is not excluded.     MRI CERVICAL SPINE WO CONTRAST  Result Date: 10/2/2024  1. Normal MRI of cervical spinal cord  2. C5-C6 mild disc degeneration without compressive abnormality      MRI THORACIC SPINE WO CONTRAST  Result Date: 10/2/2024  MRI of thoracic spine mildly degraded by CSF pulsation artifact, but there is no evidence for thoracic spinal cord pathology or cord compression      MRI LUMBAR SPINE WO CONTRAST  Result Date: 10/1/2024  1. Right paracentral extruded disc fragment at L4-5 migrates cephalad.  2. Also at L4-5 is a broad-based disc protrusion which is causing moderate to high-grade foraminal narrowing  3. Large central and left foraminal disc protrusion at L5-S1. High-grade narrowing of the left neural foramen and subarticular space.  4. Acute interbody disc herniation at L3-4.      XR Lumbar Spine Flex/Ex:  Result Date: 10/2/2024  Lateral flexion and extension views demonstrate no evidence of instability. Mild degenerative disc disease particularly at L2 L3-4 and L4-5. Facet hypertrophy at L4-5 and L5-S1.     CT Lumbar Spine:  Result Date: 10/2/2024  1. Degenerative disc disease and spondylosis with notable stenosis including L3-4, L4-5 and L5-S1.  2. Sacroiliac degenerative changes.     Labs:  Recent Labs     10/04/24  0706   WBC 6.2   HGB 11.9*   HCT 37.3          Recent Labs     
assist for mobility, pt may not have access to physical assist at d/c, making her a high fall risk.  Pt will benefit from skilled therapy to maximize safety and independence.  If pt d/c home, she will require 24 hr supervision/assist with use of RW and HHPT for increased safety.  Treatment Diagnosis: decreased functional mobility due to lumbar radiculopathy  Therapy Prognosis: Good  Decision Making: Medium Complexity  Requires PT Follow-Up: Yes  Activity Tolerance  Activity Tolerance: Patient limited by pain    Plan  Physical Therapy Plan  General Plan:  (2-5)  Current Treatment Recommendations: Strengthening, ROM, Balance training, Functional mobility training, Transfer training, Endurance training, Gait training, Stair training, Neuromuscular re-education, Home exercise program, Safety education & training, Patient/Caregiver education & training, Equipment evaluation, education, & procurement, Therapeutic activities  Safety Devices  Type of Devices: All fall risk precautions in place, Call light within reach, Chair alarm in place, Gait belt, Left in chair, Nurse notified    Restrictions  Position Activity Restriction  Other position/activity restrictions: up as tolerated     Subjective  Pain: back/bottom pain, no rating - RN aware  General  Chart Reviewed: Yes  Patient assessed for rehabilitation services?: Yes  Additional Pertinent Hx: Pt is 44 y.o. admitted to [facility] from [facility] on 10/1/24 with complaints of back pain, RLE pain, and incontinence.  MRI Spine: Right paracentral extruded disc fragment at L4-5 migrates cephalad.  2. Also at L4-5 is a broad-based disc protrusion which is causing moderate to  high-grade foraminal narrowing  3. Large central and left foraminal disc protrusion at L5-S1. High-grade  narrowing of the left neural foramen and subarticular space.  4. Acute interbody disc herniation at L3-4.  CT Lumbar spine: Degenerative disc disease and spondylosis with notable stenosis including L3-4, 
Spine:  IMPRESSION:     MRI of thoracic spine mildly degraded by CSF pulsation artifact, but there is no evidence for thoracic spinal cord pathology or cord compression    MRI Brain WO Contrast:  IMPRESSION:     1. Stable MRI of the brain compared to 7/25/2024 at acute intracranial abnormality  2. Stable mild to moderate amount of nonspecific cerebral white matter FLAIR hyperintensity, predominantly subcortical. Differential considerations include, but not limited to, chronic small vessel ischemia advanced for age, migraine headache syndrome,   chronic inflammatory or less likely demyelinating disease. No signal abnormality specifically suspicious for multiple sclerosis, but and MS is not excluded.    Labs:  Recent Labs     10/03/24  0607   WBC 6.6   HGB 11.9*   HCT 36.6          Recent Labs     10/01/24  1158 10/02/24  0553 10/03/24  0607      < > 137   K 3.9   < > 4.0      < > 101   CO2 25   < > 27   BUN 12   < > 12   CREATININE 0.8   < > 0.9   GLUCOSE 171*   < > 175*   CALCIUM 9.8   < > 9.6   MG 1.72*  --   --     < > = values in this interval not displayed.       Recent Labs     10/02/24  0553 10/02/24  1319 10/03/24  0607   PROTIME 13.7  --   --    INR 1.03  --   --    APTT 26.0   < > 94.5*    < > = values in this interval not displayed.       Patient Active Problem List    Diagnosis Date Noted    Lumbar radiculopathy 10/02/2024    Acute CVA (cerebrovascular accident) (HCC) 07/25/2024    Morbid obesity due to excess calories 07/24/2024    HTN (hypertension) 07/24/2024    Stroke-like symptoms 07/24/2024    Type 2 diabetes mellitus with retinopathy, with long-term current use of insulin (HCC) 07/24/2024    Kidney transplanted 07/24/2024    Abdominal pain 06/14/2023    Obstructive uropathy 09/06/2021    Essential hypertension, benign 12/02/2011       Assessment:  Patient is a 44 y.o. female presenting w/progressive low back pain that worsened over weekend and she had an episode of urinary 
Little  How much help is needed for putting on and taking off regular upper body clothing?: A Little  How much help is needed for taking care of personal grooming?: None  How much help for eating meals?: None  AM-PAC Inpatient Daily Activity Raw Score: 19  AM-PAC Inpatient ADL T-Scale Score : 40.22  ADL Inpatient CMS 0-100% Score: 42.8  ADL Inpatient CMS G-Code Modifier : CK    Goals  Short Term Goals  Short Term Goal 1: LB dressing with CGA using AE PRN - ongoing  Short Term Goal 2: toileting with SBA - ongoing  Short Term Goal 3: Complete UE exercises to increase BUE strength to WFL - ongoing  Short Term Goal 4: stance x5 min with CGA for ADLs - ongoing      Therapy Time   Individual Concurrent Group Co-treatment   Time In 1156         Time Out 1221         Minutes 25             Timed Code Treatment Minutes:   25    Total Treatment Minutes:   25    Carolyne Galvan Occupational Therapy Doctoral Student

## 2024-10-07 NOTE — DISCHARGE SUMMARY
10/01/2024 12:13 PM    PHUR 6.0 08/03/2023 05:01 PM    WBCUA 2 10/01/2024 12:13 PM    RBCUA 1 10/01/2024 12:13 PM    BACTERIA 1+ 10/01/2024 12:13 PM    CLARITYU Clear 10/01/2024 12:13 PM    LEUKOCYTESUR Negative 10/01/2024 12:13 PM    UROBILINOGEN 1.0 10/01/2024 12:13 PM    BILIRUBINUR Negative 10/01/2024 12:13 PM    BLOODU LARGE 10/01/2024 12:13 PM    GLUCOSEU >=1000 10/01/2024 12:13 PM    GLUCOSEU 500 06/20/2010 04:21 PM    KETUA TRACE 10/01/2024 12:13 PM     Urine Cultures:   Lab Results   Component Value Date/Time    LABURIN  08/03/2023 05:01 PM     >50,000 CFU/ml mixed skin/urogenital jen. No further workup     Blood Cultures: No results found for: \"BC\"  No results found for: \"BLOODCULT2\"  Organism: No results found for: \"ORG\"    Time Spent Discharging patient 38 minutes    Electronically signed by ARISTIDES Chau CNP on 10/7/2024 at 11:22 AM

## 2024-10-07 NOTE — CARE COORDINATION
Case Management Assessment            Discharge Note                    Date / Time of Note: 10/7/2024 12:01 PM                  Discharge Note Completed by: IRAIS Priest    Patient Name: Jasmina Hahn   YOB: 1980  Diagnosis: Lumbar radiculopathy [M54.16]   Date / Time: 10/1/2024 11:45 PM    Current PCP: Cassie Harman MD  Clinic patient: No    Hospitalization in the last 30 days: No       Advance Directives:  Code Status: Full Code  [place] DNR form completed and on chart: Not Indicated    Financial:  Payor: GENERIC COMMERCIAL / Plan: GENERIC COMMERCIAL / Product Type: Indemnity /      Pharmacy:    [id number] [place], [place] - [address] - P [phone] - F [phone]  [address]  [facility] [address]  Phone: [phone] Fax: [phone]    [facility]/pharmacy #[id number] [place], [place] - [address] - P [phone] - F [phone]  [address]  [facility] [address]  Phone: [phone] Fax: [phone]    [facility] [id number] [place], [place] - [address] - P [phone] - F [phone]  [address] [facility] [address]  Phone: [phone] Fax: [phone]      Assistance purchasing medications?: Potential Assistance Purchasing Medications: No  Assistance provided by Case Management: None at this time    Does patient want to participate in local refill/ meds to beds program?: Yes    Meds To Beds General Rules:  1. Can ONLY be done Monday- Friday between 8:30am-5pm  2. Prescription(s) must be in pharmacy by 3pm to be filled same day  3.Copy of patient's insurance/ prescription drug card and patient face sheet must be sent along with the prescription(s)  4. Cost of Rx cannot be added to hospital bill. If financial assistance is needed, please contact unit  or ;  or  CANNOT provide pharmacy voucher for patients co-pays  5. Patients can then  the prescription

## 2024-10-07 NOTE — PLAN OF CARE
NEUROSURGERY PLAN OF CARE NOTE    VERONA WALLACE  [id number]   1980   10/4/2024    Assessment:  Patient is a 44 y.o. female presenting w/progressive low back pain that worsened over weekend and she had an episode of urinary incontinence. MRI Lumbar is only imaging with acute changes. Right paracentral extruded disc fragment at L4-5 migrates cephalad, which has worsened since previous MRI on 5/23/24. Patient had a right L4/5 epidural steroid injection on 7/5/2023 and bilateral L4/5 epidural steroid injections on 5/24/2024 at [facility] with minimal relief lasting approximately 2 weeks each time.      Plan:  Neurologic exams frequency: Q4H  For change in exam MUST contact neurosurgery team along with critical care or primary team  Lumbar radiculopathy  - Dr. Benz requested Neurology see patient again as he does not feel all of her RLE symptoms are from the disc herniations at L4/5 and L5/S1. Neurology states there is no compelling historical nor objective evidence to support an alternate neurologic diagnosis although her EMG from August (done at [facility]) does suggest at least some degree of radiculopathy in RLE.  - Dr. Benz states microdiscectomy would not alleviate patient's problems, and would require a L4-S1 fusion to fix the pain caused by her disc herniations  - Patient should follow up ASAP with Dr. Benz to plan fusion surgery  - If patient would like to try an ROBERTO CARLOS, again, then primary team can work with IR to schedule ROBERTO CARLOS on Monday  Mobility: PT/OT as tolerates  Diet: Advance as tolerates  Muscle spasms: Will increase Zanaflex to 4 mg TID and PRN Valium  Pain control: Managed by medical team  DVT Prophylaxis: SCD's  Will sign off. Please call with any questions or decline in neurological status     DISPO: Dispo timing to be determined by primary team once patient is medically stable for discharge.     Patient was discussed with Dr. Benz who agrees with above assessment and plan.    
  Problem: Chronic Conditions and Co-morbidities  Goal: Patient's chronic conditions and co-morbidity symptoms are monitored and maintained or improved  10/4/2024 2006 by Gogo Berrios RN  Outcome: Progressing  10/4/2024 1134 by Suman Crum RN  Outcome: Progressing     Problem: Discharge Planning  Goal: Discharge to home or other facility with appropriate resources  10/4/2024 2006 by Gogo Berrios RN  Outcome: Progressing  10/4/2024 1259 by Darling Lim RN  Outcome: Progressing  10/4/2024 1134 by Suman Crum RN  Outcome: Progressing     Problem: Skin/Tissue Integrity  Goal: Absence of new skin breakdown  Description: 1.  Monitor for areas of redness and/or skin breakdown  2.  Assess vascular access sites hourly  3.  Every 4-6 hours minimum:  Change oxygen saturation probe site  4.  Every 4-6 hours:  If on nasal continuous positive airway pressure, respiratory therapy assess nares and determine need for appliance change or resting period.  10/4/2024 2006 by Gogo Berrios RN  Outcome: Progressing  Note: Pt on bariatric mattress, able to turn self. No new skin breakdown noted.   10/4/2024 1259 by Darling Lim RN  Outcome: Progressing  10/4/2024 1134 by Suman Crum RN  Outcome: Progressing     Problem: Pain  Goal: Verbalizes/displays adequate comfort level or baseline comfort level  10/4/2024 2006 by Gogo Berrios RN  Outcome: Progressing  Note: Pain is an on-going issue, being managed per MAR.   10/4/2024 1134 by Suman Crum RN  Outcome: Progressing     Problem: Safety - Adult  Goal: Free from fall injury  10/4/2024 2006 by Gogo Berrios RN  Outcome: Progressing  Note: Standard safety measures in place.   10/4/2024 1134 by Suman Crum RN  Outcome: Progressing     
  Problem: Chronic Conditions and Co-morbidities  Goal: Patient's chronic conditions and co-morbidity symptoms are monitored and maintained or improved  10/7/2024 1732 by Myra Oliveira RN  Outcome: Adequate for Discharge  10/7/2024 0600 by Meghna Gramajo RN  Outcome: Progressing     Problem: Discharge Planning  Goal: Discharge to home or other facility with appropriate resources  Outcome: Adequate for Discharge     Problem: Skin/Tissue Integrity  Goal: Absence of new skin breakdown  Outcome: Adequate for Discharge     Problem: Pain  Goal: Verbalizes/displays adequate comfort level or baseline comfort level  10/7/2024 1732 by Myra Oliveira RN  Outcome: Adequate for Discharge  10/7/2024 0600 by Meghna Gramajo RN  Outcome: Progressing     Problem: Safety - Adult  Goal: Free from fall injury  10/7/2024 1732 by Myra Oliveira RN  Outcome: Adequate for Discharge  10/7/2024 0600 by Meghna Gramajo RN  Outcome: Progressing  Flowsheets (Taken 10/7/2024 0123)  Free From Fall Injury: Instruct family/caregiver on patient safety     Problem: ABCDS Injury Assessment  Goal: Absence of physical injury  Outcome: Adequate for Discharge     
  Problem: Chronic Conditions and Co-morbidities  Goal: Patient's chronic conditions and co-morbidity symptoms are monitored and maintained or improved  Outcome: Progressing     Problem: Discharge Planning  Goal: Discharge to home or other facility with appropriate resources  Outcome: Progressing     Problem: Skin/Tissue Integrity  Goal: Absence of new skin breakdown  Description: 1.  Monitor for areas of redness and/or skin breakdown  2.  Assess vascular access sites hourly  3.  Every 4-6 hours minimum:  Change oxygen saturation probe site  4.  Every 4-6 hours:  If on nasal continuous positive airway pressure, respiratory therapy assess nares and determine need for appliance change or resting period.  10/4/2024 1134 by Suman Crum RN  Outcome: Progressing  10/4/2024 0134 by Carmella Bonner RN  Outcome: Progressing     Problem: Pain  Goal: Verbalizes/displays adequate comfort level or baseline comfort level  10/4/2024 1134 by Suman Crum RN  Outcome: Progressing  10/4/2024 0134 by Carmella Bonner, RN  Outcome: Progressing     Problem: Safety - Adult  Goal: Free from fall injury  10/4/2024 1134 by Suman Crum RN  Outcome: Progressing  10/4/2024 0134 by Carmella Bonner, RN  Outcome: Progressing     Problem: ABCDS Injury Assessment  Goal: Absence of physical injury  Outcome: Progressing     
  Problem: Chronic Conditions and Co-morbidities  Goal: Patient's chronic conditions and co-morbidity symptoms are monitored and maintained or improved  Outcome: Progressing     Problem: Pain  Goal: Verbalizes/displays adequate comfort level or baseline comfort level  Outcome: Progressing  Pt endorsing pain to back. Being treated with PRN pain medication, rest, and frequent repositioning with pillow support for comfort and pressure relief. Pt reports some relief from pain with above interventions.       Problem: Safety - Adult  Goal: Free from fall injury  Outcome: Progressing  Flowsheets (Taken 10/7/2024 0123)  Free From Fall Injury: Instruct family/caregiver on patient safety  All fall precautions in place. Bed locked and in lowest position with alarm on. Overbed table and personal belonings within reach. Call light within reach and patient instructed to use call light for assistance. Non-skid socks on.       
  Problem: Discharge Planning  Goal: Discharge to home or other facility with appropriate resources  10/4/2024 1259 by Darling Lim, RN  Outcome: Progressing  Plan of care reviewed with pt. All questions answered at this time.      Problem: Skin/Tissue Integrity  Goal: Absence of new skin breakdown  10/4/2024 1259 by Darling Lim, RN  Outcome: Progressing  Turn and reposition q 2 hrs.        
  Problem: Pain  Goal: Verbalizes/displays adequate comfort level or baseline comfort level  10/3/2024 0308 by Carmella Bonner, RN  Outcome: Progressing   Pt endorsing pain to lower back. Being treated with PRN pain medication, rest, and frequent repositioning with pillow support for comfort and pressure relief. Pt reports some relief from pain with above interventions.    Problem: Skin/Tissue Integrity  Goal: Absence of new skin breakdown  Description: 1.  Monitor for areas of redness and/or skin breakdown  2.  Assess vascular access sites hourly  3.  Every 4-6 hours minimum:  Change oxygen saturation probe site  4.  Every 4-6 hours:  If on nasal continuous positive airway pressure, respiratory therapy assess nares and determine need for appliance change or resting period.  Outcome: Progressing   Monitoring for skin breakdown. Pt turns self, educated pt to turn self every two hours. Pillow support for extremities.     Problem: Safety - Adult  Goal: Free from fall injury  10/3/2024 0308 by Carmella Bonner, RN  Outcome: Progressing  Flowsheets (Taken 10/2/2024 1551 by Candice Howe, RN)  Free From Fall Injury: Instruct family/caregiver on patient safety   All fall precautions in place. Bed locked and in lowest position with alarm on. Overbed table and personal belonings within reach. Call light within reach and patient instructed to use call light for assistance. Non-skid socks on.    
  Problem: Pain  Goal: Verbalizes/displays adequate comfort level or baseline comfort level  10/3/2024 0859 by Meghna Escobedo, RN  Outcome: Progressing  Pt's pain being managed by scheduled and PRN pain meds per MAR.      Problem: Safety - Adult  Goal: Free from fall injury  10/3/2024 0859 by Meghna Escobedo, RN  Outcome: Progressing   All fall precautions in place. Bed locked and in lowest position with alarm on. Overbed table and personal belonings within reach. Call light within reach and patient instructed to use call light for assistance. Non-skid socks on.   
  Problem: Pain  Goal: Verbalizes/displays adequate comfort level or baseline comfort level  Note: Back pain with right leg PAIN ,  able to turn self , ambulate to bathroom with stand by assist , walker gait belt , iv pain med's for occasional breakthrough pain , plan to see , neuro surgery with possible epidural steroid , injection , heparin , drip , therapeutic , infusing left ac      
  Problem: Safety - Adult  Goal: Free from fall injury  Outcome: Progressing  Flowsheets (Taken 10/2/2024 1551)  Free From Fall Injury: Instruct family/caregiver on patient safety  Note: Bed locked and lowered. Bed alarm on. Grippy socks on. Floor free from clutter. Ambulating 1x assist with gaitbelt and walker. Uses call light appropriately for needs and assistance. Call light left within reach. Hourly rounding. Plan of care continues.     Problem: Pain  Goal: Verbalizes/displays adequate comfort level or baseline comfort level  Outcome: Progressing  Flowsheets (Taken 10/2/2024 1527)  Verbalizes/displays adequate comfort level or baseline comfort level:   Encourage patient to monitor pain and request assistance   Administer analgesics based on type and severity of pain and evaluate response   Assess pain using appropriate pain scale   Implement non-pharmacological measures as appropriate and evaluate response  Note: Pt uses numerical scale. Pt endorsing pain in lower back and lower extremities, managed with prn and scheduled pain medications per MAR. Heat pack applied to lower back as requested by pt. Educated about medication schedule and to notify RN of new or worsening pain. Verbalizes understanding. Call light left within reach. Hourly rounding. Plan of care continues.     
  Problem: Skin/Tissue Integrity  Goal: Absence of new skin breakdown  Description: 1.  Monitor for areas of redness and/or skin breakdown  2.  Assess vascular access sites hourly  3.  Every 4-6 hours minimum:  Change oxygen saturation probe site  4.  Every 4-6 hours:  If on nasal continuous positive airway pressure, respiratory therapy assess nares and determine need for appliance change or resting period.  Outcome: Progressing   Pt educated on turning self Q2. Monitoring for new skin breakdown.    Problem: Pain  Goal: Verbalizes/displays adequate comfort level or baseline comfort level  Outcome: Progressing   Pt endorsing pain to lower back. Being treated with PRN pain medication, rest, and frequent repositioning with pillow support for comfort and pressure relief. Pt reports some relief from pain with above interventions.    Problem: Safety - Adult  Goal: Free from fall injury  Outcome: Progressing   All fall precautions in place. Bed locked and in lowest position with alarm on. Overbed table and personal belonings within reach. Call light within reach and patient instructed to use call light for assistance. Non-skid socks on.    
  Problem: Skin/Tissue Integrity  Goal: Absence of new skin breakdown  Description: 1.  Monitor for areas of redness and/or skin breakdown  2.  Assess vascular access sites hourly  3.  Every 4-6 hours minimum:  Change oxygen saturation probe site  4.  Every 4-6 hours:  If on nasal continuous positive airway pressure, respiratory therapy assess nares and determine need for appliance change or resting period.  Outcome: Progressing  Note: Pt on yaritza mattress, turns self well. Rash present to chest, resolving. No new skin breakdown noted.      Problem: Pain  Goal: Verbalizes/displays adequate comfort level or baseline comfort level  10/5/2024 1948 by Gogo Berrios, RN  Outcome: Progressing  Flowsheets (Taken 10/2/2024 1527 by Candice Howe, RN)  Verbalizes/displays adequate comfort level or baseline comfort level:   Encourage patient to monitor pain and request assistance   Administer analgesics based on type and severity of pain and evaluate response   Assess pain using appropriate pain scale   Implement non-pharmacological measures as appropriate and evaluate response  Note: Pain being managed per MAR.   10/5/2024 1552 by Myron Urbina, RN  Note: Back pain with right leg PAIN ,  able to turn self , ambulate to bathroom with stand by assist , walker gait belt , iv pain med's for occasional breakthrough pain , plan to see , neuro surgery with possible epidural steroid , injection , heparin , drip , therapeutic , infusing left ac      Problem: Safety - Adult  Goal: Free from fall injury  Outcome: Progressing  Note: Standard safety measures in place.      
Brief note: Patient seen ad examined.    At the time of my exam, pt c/o lower back pain, numbness and weakness in RLE>LLE.    Plan:   - Ordered MRI Brain, MRI cspine and MRI tspine  - Q4h neuro checks  - NSGY consult in AM  - Please call with any changes in exam    4 - Opens eyes on own  5 - Alert and oriented  6 - Follows simple motor commands     Neurologic:  Mental status:   orientation to person and place              General fund of knowledge grossly intact              Memory grossly intact              Attention intact as able to attend well to the exam                Language fluent in conversation              Comprehension intact; follows simple commands    Cranial nerves:   CN2: Visual Fields full w/o extinction on confrontational testing,   CN 3,4,6: Pupils 4mm ->2mm bilaterally, extraocular muscles intact,  CN5: facial sensation symmetric   CN7:face symmetric without dysarthria,   CN8: hearing grossly intact  Motor Exam:   R  L    Deltoid 5 5   Biceps 5 5   Triceps 5 5   Wrist extension  5 5   Interossei 5 5      R  L    Hip flexion  3 4+   Hip extension  3 4+   Knee flexion  3 4   Knee extension  3 4   Ankle dorsiflexion  5 5   Ankle plantar flexion  5 5       Deep tendon reflexes:    R  L    Biceps  2+  2+   Triceps  2+ 2+   Brachioradialis  2+ 2+   Patellar  2+ 2+   Achilles  2+ 2+     Bilateral ankle clonus noted with R>L    Sensory:Decreased light touch sensation in bilateral buttock that radiates down R leg  Cerebellar/coordination: finger nose finger normal without ataxia  Tone: normal in all 4 extremities  Gait: deferred for safety      ARISTIDES Grey - CNP   Neurology & Neurocritical Care   Neurology Line: [phone]  [facility]: [facility] NPs    
Pain level 5 to 10 waiting to talk with neuro surgery concerning , status , plan epidural injection in the morning , up to bathroom to void , stand by assist     
What Type Of Note Output Would You Prefer (Optional)?: Bullet Format
How Severe Is Your Acne?: mild
Is This A New Presentation, Or A Follow-Up?: Acne

## 2024-10-25 ENCOUNTER — HOSPITAL ENCOUNTER (EMERGENCY)
Age: 44
Discharge: HOME | End: 2024-10-26
Payer: COMMERCIAL

## 2024-10-25 ENCOUNTER — APPOINTMENT (OUTPATIENT)
Dept: CT IMAGING | Age: 44
End: 2024-10-25
Payer: COMMERCIAL

## 2024-10-25 DIAGNOSIS — R51.9 ACUTE NONINTRACTABLE HEADACHE, UNSPECIFIED HEADACHE TYPE: Primary | ICD-10-CM

## 2024-10-25 DIAGNOSIS — I10 CHRONIC HYPERTENSION: ICD-10-CM

## 2024-10-25 LAB
ALBUMIN SERPL-MCNC: 4.3 G/DL (ref 3.4–5)
ALBUMIN/GLOB SERPL: 1.1 {RATIO} (ref 1.1–2.2)
ALP SERPL-CCNC: 79 U/L (ref 40–129)
ALT SERPL-CCNC: 21 U/L (ref 10–40)
ANION GAP SERPL CALCULATED.3IONS-SCNC: 14 MMOL/L (ref 3–16)
AST SERPL-CCNC: 23 U/L (ref 15–37)
BASOPHILS # BLD: 0 K/UL (ref 0–0.2)
BASOPHILS NFR BLD: 0.5 %
BILIRUB SERPL-MCNC: 0.8 MG/DL (ref 0–1)
BUN SERPL-MCNC: 15 MG/DL (ref 7–20)
CALCIUM SERPL-MCNC: 10.2 MG/DL (ref 8.3–10.6)
CHLORIDE SERPL-SCNC: 101 MMOL/L (ref 99–110)
CO2 SERPL-SCNC: 23 MMOL/L (ref 21–32)
CREAT SERPL-MCNC: 1 MG/DL (ref 0.6–1.1)
DEPRECATED RDW RBC AUTO: 15 % (ref 12.4–15.4)
EOSINOPHIL # BLD: 0.1 K/UL (ref 0–0.6)
EOSINOPHIL NFR BLD: 1 %
GFR SERPLBLD CREATININE-BSD FMLA CKD-EPI: 72 ML/MIN/{1.73_M2}
GLUCOSE BLD-MCNC: 142 MG/DL (ref 70–99)
GLUCOSE SERPL-MCNC: 143 MG/DL (ref 70–99)
HCT VFR BLD AUTO: 38.3 % (ref 36–48)
HGB BLD-MCNC: 13 G/DL (ref 12–16)
LYMPHOCYTES # BLD: 1.2 K/UL (ref 1–5.1)
LYMPHOCYTES NFR BLD: 13.5 %
MCH RBC QN AUTO: 28.5 PG (ref 26–34)
MCHC RBC AUTO-ENTMCNC: 33.9 G/DL (ref 31–36)
MCV RBC AUTO: 84.1 FL (ref 80–100)
MONOCYTES # BLD: 0.8 K/UL (ref 0–1.3)
MONOCYTES NFR BLD: 8.7 %
NEUTROPHILS # BLD: 6.6 K/UL (ref 1.7–7.7)
NEUTROPHILS NFR BLD: 76.3 %
PERFORMED ON: ABNORMAL
PLATELET # BLD AUTO: 262 K/UL (ref 135–450)
PMV BLD AUTO: 8 FL (ref 5–10.5)
POTASSIUM SERPL-SCNC: 3.6 MMOL/L (ref 3.5–5.1)
PROT SERPL-MCNC: 8.1 G/DL (ref 6.4–8.2)
RBC # BLD AUTO: 4.55 M/UL (ref 4–5.2)
SODIUM SERPL-SCNC: 138 MMOL/L (ref 136–145)
TROPONIN, HIGH SENSITIVITY: 9 NG/L (ref 0–14)
WBC # BLD AUTO: 8.7 K/UL (ref 4–11)

## 2024-10-25 PROCEDURE — 6370000000 HC RX 637 (ALT 250 FOR IP): Performed by: NURSE PRACTITIONER

## 2024-10-25 PROCEDURE — 80053 COMPREHEN METABOLIC PANEL: CPT

## 2024-10-25 PROCEDURE — 93005 ELECTROCARDIOGRAM TRACING: CPT | Performed by: NURSE PRACTITIONER

## 2024-10-25 PROCEDURE — 6360000002 HC RX W HCPCS: Performed by: NURSE PRACTITIONER

## 2024-10-25 PROCEDURE — 96375 TX/PRO/DX INJ NEW DRUG ADDON: CPT

## 2024-10-25 PROCEDURE — 84484 ASSAY OF TROPONIN QUANT: CPT

## 2024-10-25 PROCEDURE — 70450 CT HEAD/BRAIN W/O DYE: CPT

## 2024-10-25 PROCEDURE — 85025 COMPLETE CBC W/AUTO DIFF WBC: CPT

## 2024-10-25 PROCEDURE — 99284 EMERGENCY DEPT VISIT MOD MDM: CPT

## 2024-10-25 PROCEDURE — 96365 THER/PROPH/DIAG IV INF INIT: CPT

## 2024-10-25 RX ORDER — METOCLOPRAMIDE HYDROCHLORIDE 5 MG/ML
10 INJECTION INTRAMUSCULAR; INTRAVENOUS ONCE
Status: COMPLETED | OUTPATIENT
Start: 2024-10-25 | End: 2024-10-25

## 2024-10-25 RX ORDER — DIPHENHYDRAMINE HYDROCHLORIDE 50 MG/ML
25 INJECTION INTRAMUSCULAR; INTRAVENOUS ONCE
Status: COMPLETED | OUTPATIENT
Start: 2024-10-25 | End: 2024-10-25

## 2024-10-25 RX ORDER — MAGNESIUM SULFATE 1 G/100ML
1000 INJECTION INTRAVENOUS ONCE
Status: COMPLETED | OUTPATIENT
Start: 2024-10-25 | End: 2024-10-26

## 2024-10-25 RX ORDER — LISINOPRIL 10 MG/1
20 TABLET ORAL ONCE
Status: COMPLETED | OUTPATIENT
Start: 2024-10-25 | End: 2024-10-25

## 2024-10-25 RX ORDER — LABETALOL HYDROCHLORIDE 5 MG/ML
10 INJECTION, SOLUTION INTRAVENOUS ONCE
Status: DISCONTINUED | OUTPATIENT
Start: 2024-10-25 | End: 2024-10-26 | Stop reason: HOSPADM

## 2024-10-25 RX ORDER — DEXAMETHASONE SODIUM PHOSPHATE 4 MG/ML
10 INJECTION, SOLUTION INTRA-ARTICULAR; INTRALESIONAL; INTRAMUSCULAR; INTRAVENOUS; SOFT TISSUE ONCE
Status: COMPLETED | OUTPATIENT
Start: 2024-10-25 | End: 2024-10-25

## 2024-10-25 RX ADMIN — MAGNESIUM SULFATE HEPTAHYDRATE 1000 MG: 1 INJECTION, SOLUTION INTRAVENOUS at 23:27

## 2024-10-25 RX ADMIN — DEXAMETHASONE SODIUM PHOSPHATE 10 MG: 4 INJECTION, SOLUTION INTRAMUSCULAR; INTRAVENOUS at 23:18

## 2024-10-25 RX ADMIN — LISINOPRIL 20 MG: 10 TABLET ORAL at 23:27

## 2024-10-25 RX ADMIN — METOCLOPRAMIDE HYDROCHLORIDE 10 MG: 5 INJECTION, SOLUTION INTRAMUSCULAR; INTRAVENOUS at 23:21

## 2024-10-25 RX ADMIN — DIPHENHYDRAMINE HYDROCHLORIDE 25 MG: 50 INJECTION INTRAMUSCULAR; INTRAVENOUS at 23:24

## 2024-10-25 ASSESSMENT — PAIN - FUNCTIONAL ASSESSMENT
PAIN_FUNCTIONAL_ASSESSMENT: ACTIVITIES ARE NOT PREVENTED
PAIN_FUNCTIONAL_ASSESSMENT: 0-10

## 2024-10-25 ASSESSMENT — PAIN SCALES - GENERAL: PAINLEVEL_OUTOF10: 7

## 2024-10-25 ASSESSMENT — ENCOUNTER SYMPTOMS
RESPIRATORY NEGATIVE: 1
GASTROINTESTINAL NEGATIVE: 1

## 2024-10-25 ASSESSMENT — PAIN DESCRIPTION - DESCRIPTORS: DESCRIPTORS: ACHING

## 2024-10-25 ASSESSMENT — PAIN DESCRIPTION - PAIN TYPE: TYPE: ACUTE PAIN

## 2024-10-25 ASSESSMENT — PAIN DESCRIPTION - LOCATION: LOCATION: HEAD

## 2024-10-25 ASSESSMENT — PAIN DESCRIPTION - FREQUENCY: FREQUENCY: CONTINUOUS

## 2024-10-26 VITALS
HEART RATE: 98 BPM | RESPIRATION RATE: 21 BRPM | OXYGEN SATURATION: 100 % | SYSTOLIC BLOOD PRESSURE: 144 MMHG | DIASTOLIC BLOOD PRESSURE: 97 MMHG | TEMPERATURE: 97.6 F

## 2024-10-26 LAB
EKG ATRIAL RATE: 94 BPM
EKG DIAGNOSIS: NORMAL
EKG P AXIS: 38 DEGREES
EKG P-R INTERVAL: 144 MS
EKG Q-T INTERVAL: 366 MS
EKG QRS DURATION: 78 MS
EKG QTC CALCULATION (BAZETT): 457 MS
EKG R AXIS: -11 DEGREES
EKG T AXIS: 22 DEGREES
EKG VENTRICULAR RATE: 94 BPM

## 2024-10-26 PROCEDURE — 93010 ELECTROCARDIOGRAM REPORT: CPT | Performed by: INTERNAL MEDICINE

## 2024-10-26 PROCEDURE — 6370000000 HC RX 637 (ALT 250 FOR IP): Performed by: NURSE PRACTITIONER

## 2024-10-26 RX ORDER — BUTALBITAL, ACETAMINOPHEN AND CAFFEINE 50; 325; 40 MG/1; MG/1; MG/1
1 TABLET ORAL ONCE
Status: COMPLETED | OUTPATIENT
Start: 2024-10-26 | End: 2024-10-26

## 2024-10-26 RX ORDER — LISINOPRIL 20 MG/1
20 TABLET ORAL DAILY
Qty: 30 TABLET | Refills: 1 | Status: SHIPPED | OUTPATIENT
Start: 2024-10-26

## 2024-10-26 RX ADMIN — BUTALBITAL, ACETAMINOPHEN AND CAFFEINE 1 TABLET: 325; 50; 40 TABLET ORAL at 00:14

## 2024-10-26 ASSESSMENT — PAIN SCALES - GENERAL: PAINLEVEL_OUTOF10: 4

## 2024-10-26 NOTE — ED NOTES
Pt hypertensive in triage. Spouse states that pt has been out of one of her three BP medications but unsure how long

## 2024-10-26 NOTE — ED PROVIDER NOTES
[facility] EMERGENCY DEPARTMENT  [address] [address]  Dept: [phone]  Loc: [phone]  eMERGENCYdEPARTMENT eNCOUnter      Pt Name: Jasmina Hahn  MRN: [id number]  Birthdate 1980  Date of evaluation: 10/25/2024  Provider:ARISTIDES Palmer CNP      Independently seen and evaluated by the advanced practice provider  CHIEF COMPLAINT       Chief Complaint   Patient presents with    Headache     Pt into ED from home with spouse with c/o 7/10 headache pain that began this evening at 2000. Family stating that pt has had episodes of confusion where she takes slower to respond. Pt a/ox4 on arrival, no slurred speech or facial drooping at this time.        CRITICAL CARE TIME       HISTORY OF PRESENT ILLNESS  (Location/Symptom, Timing/Onset, Context/Setting, Quality, Duration,Modifying Factors, Severity.)   Jasmina Hahn is a 44 y.o. female who presents to the emergency department PMHx: Type II DM, CVA, HTN, strokelike symptoms, chronic midline low back pain    Lives at home  Anticoagulation therapy: Eliquis  CODE STATUS full  Social determinants: Employed, no disability, new food or housing disparity noted    HPI provided by the patient  Patient presenting to the emergency department complaining of photosensitivity and headache.  She stated that her  thought that she was not acting right around 8:00.  Stated that he ask her question and she just kind of stared at him.  Patient complaining of some neck pain.  Negative for chest pain.  She is denying any double vision or blurred vision.  She said her  thought maybe her left side of her face was drooping.  She had no difficulty speaking.  She has no loss of sensation of the face or upper extremities.  She has mild nausea after the headache onset.  The headache onset was not thunderclap in nature.  Patient has not taken her 20 mg daily lisinopril for 4 days.      Nursing  instructed to move patient to room for telemetry establish an IV for labetalol and to recheck blood pressure  I had ordered a CT head.  Her NIH stroke scale score is 0.  No facial drooping.  She ambulated without any evidence of ataxia.  She follows commands.  Speech is clear and articulate.  Cranial nerves II through XII grossly intact no focal motor or sensory deficit.  She is unable to have CTA head and neck with contrast due to allergy    Recheck on the blood pressures 159/87 followed by 146/89.  Patient continuing to have a headache.  I do not see any findings that is consistent with ID ESS, encephalopathy or hypertensive emergency or urgency    Patient will be given a migraine cocktail.    Plain CT head negative  CBC unremarkable.  Metabolic panel glucose 143 and TELLO 9    0005: Current blood pressure 144/97.  Patient sitting on the edge of the bed.  Informing me that her headaches improved and she is ready to go.  Headache is not completely gone but much improved.  Will medicate with a single dose of Fioricet prior to discharge.  And will print off her lisinopril prescription for her to get refilled.  CONSULTS:  None    PROCEDURES:  Procedures    FINAL IMPRESSION      1. Acute nonintractable headache, unspecified headache type    2. Chronic hypertension          DISPOSITION/PLAN   [unfilled]    PATIENT REFERRED TO:  Cassie Harman MD  [facility]  [address]  [place] [address]  [phone]      As needed, If symptoms worsen    [facility] Emergency Department  [address] [place] [address]  [phone]    As needed, If symptoms worsen      DISCHARGE MEDICATIONS:  Discharge Medication List as of 10/26/2024 12:13 AM          (Please note that portions of this note were completed with a voice recognition program.  Efforts were made to edit the dictations but occasionally words are mis-transcribed.)    Karissa Troncoso, APRN - CNP     This dictation was performed

## 2024-10-26 NOTE — ED TRIAGE NOTES
Pt into ED from home with spouse with c/o 7/10 headache pain that began this evening at 2000. Family stating that pt has had episodes of confusion where she takes slower to respond. Pt a/ox4 on arrival, no slurred speech or facial drooping at this time. Pt does take eliquis daily. Denies falls or injuries.

## 2025-07-14 ENCOUNTER — APPOINTMENT (OUTPATIENT)
Dept: CT IMAGING | Age: 45
DRG: 378 | End: 2025-07-14
Payer: COMMERCIAL

## 2025-07-14 ENCOUNTER — HOSPITAL ENCOUNTER (INPATIENT)
Age: 45
LOS: 5 days | Discharge: HOME | DRG: 378 | End: 2025-07-21
Attending: EMERGENCY MEDICINE
Payer: COMMERCIAL

## 2025-07-14 DIAGNOSIS — Z94.0 HISTORY OF KIDNEY TRANSPLANT: ICD-10-CM

## 2025-07-14 DIAGNOSIS — D84.9 IMMUNOSUPPRESSED STATUS: ICD-10-CM

## 2025-07-14 DIAGNOSIS — R42 VERTIGO: ICD-10-CM

## 2025-07-14 DIAGNOSIS — K92.0 HEMATEMESIS WITH NAUSEA: Primary | ICD-10-CM

## 2025-07-14 LAB
ABO + RH BLD: NORMAL
ALBUMIN SERPL-MCNC: 4.2 G/DL (ref 3.4–5)
ALBUMIN/GLOB SERPL: 1.1 {RATIO} (ref 1.1–2.2)
ALP SERPL-CCNC: 88 U/L (ref 40–129)
ALT SERPL-CCNC: 19 U/L (ref 10–40)
ANION GAP SERPL CALCULATED.3IONS-SCNC: 12 MMOL/L (ref 3–16)
ANTIBODY SCREEN: NORMAL
AST SERPL-CCNC: 23 U/L (ref 15–37)
BACTERIA URNS QL MICRO: NORMAL /HPF
BASOPHILS # BLD: 0.1 K/UL (ref 0–0.2)
BASOPHILS NFR BLD: 0.5 %
BILIRUB SERPL-MCNC: 0.5 MG/DL (ref 0–1)
BILIRUB UR QL STRIP.AUTO: NEGATIVE
BUN SERPL-MCNC: 10 MG/DL (ref 7–20)
CALCIUM SERPL-MCNC: 9.7 MG/DL (ref 8.3–10.6)
CHLORIDE SERPL-SCNC: 106 MMOL/L (ref 99–110)
CLARITY UR: CLEAR
CO2 SERPL-SCNC: 23 MMOL/L (ref 21–32)
COLOR UR: YELLOW
CREAT SERPL-MCNC: 0.9 MG/DL (ref 0.6–1.1)
DEPRECATED RDW RBC AUTO: 16.1 % (ref 12.4–15.4)
EOSINOPHIL # BLD: 0.1 K/UL (ref 0–0.6)
EOSINOPHIL NFR BLD: 0.5 %
EPI CELLS #/AREA URNS AUTO: 2 /HPF (ref 0–5)
GFR SERPLBLD CREATININE-BSD FMLA CKD-EPI: 81 ML/MIN/{1.73_M2}
GLUCOSE SERPL-MCNC: 95 MG/DL (ref 70–99)
GLUCOSE UR STRIP.AUTO-MCNC: NEGATIVE MG/DL
HCG SERPL QL: NEGATIVE
HCT VFR BLD AUTO: 33.8 % (ref 36–48)
HCT VFR BLD AUTO: 35.9 % (ref 36–48)
HEMOCCULT STL QL: NORMAL
HGB BLD-MCNC: 10.6 G/DL (ref 12–16)
HGB BLD-MCNC: 11.4 G/DL (ref 12–16)
HGB UR QL STRIP.AUTO: NEGATIVE
HYALINE CASTS #/AREA URNS AUTO: 0 /LPF (ref 0–8)
INR PPP: 0.97 (ref 0.86–1.14)
KETONES UR STRIP.AUTO-MCNC: NEGATIVE MG/DL
LEUKOCYTE ESTERASE UR QL STRIP.AUTO: NEGATIVE
LIPASE SERPL-CCNC: 44 U/L (ref 13–60)
LYMPHOCYTES # BLD: 2 K/UL (ref 1–5.1)
LYMPHOCYTES NFR BLD: 20.4 %
MCH RBC QN AUTO: 24.8 PG (ref 26–34)
MCHC RBC AUTO-ENTMCNC: 31.8 G/DL (ref 31–36)
MCV RBC AUTO: 78 FL (ref 80–100)
MONOCYTES # BLD: 0.8 K/UL (ref 0–1.3)
MONOCYTES NFR BLD: 8.6 %
NEUTROPHILS # BLD: 6.7 K/UL (ref 1.7–7.7)
NEUTROPHILS NFR BLD: 70 %
NITRITE UR QL STRIP.AUTO: NEGATIVE
PH UR STRIP.AUTO: 7 [PH] (ref 5–8)
PLATELET # BLD AUTO: 325 K/UL (ref 135–450)
PMV BLD AUTO: 7.8 FL (ref 5–10.5)
POTASSIUM SERPL-SCNC: 3.8 MMOL/L (ref 3.5–5.1)
PROT SERPL-MCNC: 8.1 G/DL (ref 6.4–8.2)
PROT UR STRIP.AUTO-MCNC: ABNORMAL MG/DL
PROTHROMBIN TIME: 13.2 SEC (ref 12.1–14.9)
RBC # BLD AUTO: 4.6 M/UL (ref 4–5.2)
RBC CLUMPS #/AREA URNS AUTO: 0 /HPF (ref 0–4)
SODIUM SERPL-SCNC: 141 MMOL/L (ref 136–145)
SP GR UR STRIP.AUTO: 1.02 (ref 1–1.03)
TROPONIN, HIGH SENSITIVITY: 10 NG/L (ref 0–14)
UA COMPLETE W REFLEX CULTURE PNL UR: ABNORMAL
UA DIPSTICK W REFLEX MICRO PNL UR: YES
URN SPEC COLLECT METH UR: ABNORMAL
UROBILINOGEN UR STRIP-ACNC: 1 E.U./DL
WBC # BLD AUTO: 9.6 K/UL (ref 4–11)
WBC #/AREA URNS AUTO: 1 /HPF (ref 0–5)

## 2025-07-14 PROCEDURE — 85610 PROTHROMBIN TIME: CPT

## 2025-07-14 PROCEDURE — 84484 ASSAY OF TROPONIN QUANT: CPT

## 2025-07-14 PROCEDURE — 86850 RBC ANTIBODY SCREEN: CPT

## 2025-07-14 PROCEDURE — 96375 TX/PRO/DX INJ NEW DRUG ADDON: CPT

## 2025-07-14 PROCEDURE — 85014 HEMATOCRIT: CPT

## 2025-07-14 PROCEDURE — 85018 HEMOGLOBIN: CPT

## 2025-07-14 PROCEDURE — G0378 HOSPITAL OBSERVATION PER HR: HCPCS

## 2025-07-14 PROCEDURE — 2580000003 HC RX 258: Performed by: NURSE PRACTITIONER

## 2025-07-14 PROCEDURE — 80053 COMPREHEN METABOLIC PANEL: CPT

## 2025-07-14 PROCEDURE — 93005 ELECTROCARDIOGRAM TRACING: CPT | Performed by: EMERGENCY MEDICINE

## 2025-07-14 PROCEDURE — 82270 OCCULT BLOOD FECES: CPT

## 2025-07-14 PROCEDURE — 70496 CT ANGIOGRAPHY HEAD: CPT

## 2025-07-14 PROCEDURE — 6360000002 HC RX W HCPCS: Performed by: NURSE PRACTITIONER

## 2025-07-14 PROCEDURE — 84703 CHORIONIC GONADOTROPIN ASSAY: CPT

## 2025-07-14 PROCEDURE — 6370000000 HC RX 637 (ALT 250 FOR IP): Performed by: EMERGENCY MEDICINE

## 2025-07-14 PROCEDURE — 81001 URINALYSIS AUTO W/SCOPE: CPT

## 2025-07-14 PROCEDURE — 99285 EMERGENCY DEPT VISIT HI MDM: CPT

## 2025-07-14 PROCEDURE — 6360000004 HC RX CONTRAST MEDICATION: Performed by: EMERGENCY MEDICINE

## 2025-07-14 PROCEDURE — 6360000002 HC RX W HCPCS: Performed by: EMERGENCY MEDICINE

## 2025-07-14 PROCEDURE — 96376 TX/PRO/DX INJ SAME DRUG ADON: CPT

## 2025-07-14 PROCEDURE — 85025 COMPLETE CBC W/AUTO DIFF WBC: CPT

## 2025-07-14 PROCEDURE — 83690 ASSAY OF LIPASE: CPT

## 2025-07-14 PROCEDURE — 96374 THER/PROPH/DIAG INJ IV PUSH: CPT

## 2025-07-14 PROCEDURE — 74177 CT ABD & PELVIS W/CONTRAST: CPT

## 2025-07-14 PROCEDURE — 86901 BLOOD TYPING SEROLOGIC RH(D): CPT

## 2025-07-14 PROCEDURE — 70450 CT HEAD/BRAIN W/O DYE: CPT

## 2025-07-14 PROCEDURE — 36415 COLL VENOUS BLD VENIPUNCTURE: CPT

## 2025-07-14 PROCEDURE — 2580000003 HC RX 258: Performed by: EMERGENCY MEDICINE

## 2025-07-14 PROCEDURE — 86900 BLOOD TYPING SEROLOGIC ABO: CPT

## 2025-07-14 RX ORDER — CYCLOBENZAPRINE HCL 5 MG
5 TABLET ORAL 3 TIMES DAILY PRN
COMMUNITY
Start: 2025-05-29

## 2025-07-14 RX ORDER — METHOCARBAMOL 500 MG/1
750 TABLET, FILM COATED ORAL 3 TIMES DAILY
COMMUNITY
Start: 2025-07-07

## 2025-07-14 RX ORDER — MECLIZINE HCL 12.5 MG 12.5 MG/1
25 TABLET ORAL ONCE
Status: COMPLETED | OUTPATIENT
Start: 2025-07-14 | End: 2025-07-14

## 2025-07-14 RX ORDER — LISINOPRIL 20 MG/1
20 TABLET ORAL DAILY
Status: DISCONTINUED | OUTPATIENT
Start: 2025-07-15 | End: 2025-07-21 | Stop reason: HOSPADM

## 2025-07-14 RX ORDER — DEXTROSE MONOHYDRATE 100 MG/ML
INJECTION, SOLUTION INTRAVENOUS CONTINUOUS PRN
Status: DISCONTINUED | OUTPATIENT
Start: 2025-07-14 | End: 2025-07-21 | Stop reason: HOSPADM

## 2025-07-14 RX ORDER — INSULIN LISPRO 100 [IU]/ML
0-4 INJECTION, SOLUTION INTRAVENOUS; SUBCUTANEOUS
Status: DISCONTINUED | OUTPATIENT
Start: 2025-07-14 | End: 2025-07-15

## 2025-07-14 RX ORDER — BACLOFEN 10 MG/1
10 TABLET ORAL NIGHTLY PRN
COMMUNITY
Start: 2025-07-07

## 2025-07-14 RX ORDER — ONDANSETRON 4 MG/1
4 TABLET, ORALLY DISINTEGRATING ORAL EVERY 8 HOURS PRN
Status: DISCONTINUED | OUTPATIENT
Start: 2025-07-14 | End: 2025-07-21 | Stop reason: HOSPADM

## 2025-07-14 RX ORDER — POTASSIUM CHLORIDE 1500 MG/1
40 TABLET, EXTENDED RELEASE ORAL PRN
Status: DISCONTINUED | OUTPATIENT
Start: 2025-07-14 | End: 2025-07-21 | Stop reason: HOSPADM

## 2025-07-14 RX ORDER — ACETAMINOPHEN 650 MG/1
650 SUPPOSITORY RECTAL EVERY 6 HOURS PRN
Status: DISCONTINUED | OUTPATIENT
Start: 2025-07-14 | End: 2025-07-21 | Stop reason: HOSPADM

## 2025-07-14 RX ORDER — POTASSIUM CHLORIDE 7.45 MG/ML
10 INJECTION INTRAVENOUS PRN
Status: DISCONTINUED | OUTPATIENT
Start: 2025-07-14 | End: 2025-07-21 | Stop reason: HOSPADM

## 2025-07-14 RX ORDER — HYDRALAZINE HYDROCHLORIDE 20 MG/ML
10 INJECTION INTRAMUSCULAR; INTRAVENOUS ONCE
Status: DISCONTINUED | OUTPATIENT
Start: 2025-07-14 | End: 2025-07-14

## 2025-07-14 RX ORDER — SODIUM CHLORIDE 9 MG/ML
INJECTION, SOLUTION INTRAVENOUS PRN
Status: DISCONTINUED | OUTPATIENT
Start: 2025-07-14 | End: 2025-07-21 | Stop reason: HOSPADM

## 2025-07-14 RX ORDER — AMLODIPINE BESYLATE 10 MG/1
10 TABLET ORAL DAILY
Status: DISCONTINUED | OUTPATIENT
Start: 2025-07-15 | End: 2025-07-21 | Stop reason: HOSPADM

## 2025-07-14 RX ORDER — TACROLIMUS 1 MG/1
1 CAPSULE ORAL EVERY MORNING
Status: DISCONTINUED | OUTPATIENT
Start: 2025-07-15 | End: 2025-07-21 | Stop reason: HOSPADM

## 2025-07-14 RX ORDER — MAGNESIUM SULFATE IN WATER 40 MG/ML
2000 INJECTION, SOLUTION INTRAVENOUS PRN
Status: DISCONTINUED | OUTPATIENT
Start: 2025-07-14 | End: 2025-07-21 | Stop reason: HOSPADM

## 2025-07-14 RX ORDER — PREGABALIN 25 MG/1
25 CAPSULE ORAL 3 TIMES DAILY
Status: DISCONTINUED | OUTPATIENT
Start: 2025-07-14 | End: 2025-07-21 | Stop reason: HOSPADM

## 2025-07-14 RX ORDER — 0.9 % SODIUM CHLORIDE 0.9 %
1000 INTRAVENOUS SOLUTION INTRAVENOUS ONCE
Status: COMPLETED | OUTPATIENT
Start: 2025-07-14 | End: 2025-07-14

## 2025-07-14 RX ORDER — ONDANSETRON 2 MG/ML
4 INJECTION INTRAMUSCULAR; INTRAVENOUS EVERY 6 HOURS PRN
Status: DISCONTINUED | OUTPATIENT
Start: 2025-07-14 | End: 2025-07-21 | Stop reason: HOSPADM

## 2025-07-14 RX ORDER — SODIUM CHLORIDE 0.9 % (FLUSH) 0.9 %
5-40 SYRINGE (ML) INJECTION PRN
Status: DISCONTINUED | OUTPATIENT
Start: 2025-07-14 | End: 2025-07-21 | Stop reason: HOSPADM

## 2025-07-14 RX ORDER — SODIUM CHLORIDE, SODIUM LACTATE, POTASSIUM CHLORIDE, CALCIUM CHLORIDE 600; 310; 30; 20 MG/100ML; MG/100ML; MG/100ML; MG/100ML
INJECTION, SOLUTION INTRAVENOUS CONTINUOUS
Status: DISCONTINUED | OUTPATIENT
Start: 2025-07-14 | End: 2025-07-15

## 2025-07-14 RX ORDER — INSULIN LISPRO 100 [IU]/ML
15 INJECTION, SOLUTION INTRAVENOUS; SUBCUTANEOUS
Status: DISCONTINUED | OUTPATIENT
Start: 2025-07-15 | End: 2025-07-20

## 2025-07-14 RX ORDER — PANTOPRAZOLE SODIUM 40 MG/10ML
80 INJECTION, POWDER, LYOPHILIZED, FOR SOLUTION INTRAVENOUS ONCE
Status: COMPLETED | OUTPATIENT
Start: 2025-07-14 | End: 2025-07-14

## 2025-07-14 RX ORDER — TACROLIMUS 0.5 MG/1
0.5 CAPSULE ORAL EVERY MORNING
Status: DISCONTINUED | OUTPATIENT
Start: 2025-07-15 | End: 2025-07-21 | Stop reason: HOSPADM

## 2025-07-14 RX ORDER — ACETAMINOPHEN 325 MG/1
650 TABLET ORAL EVERY 6 HOURS PRN
Status: DISCONTINUED | OUTPATIENT
Start: 2025-07-14 | End: 2025-07-21 | Stop reason: HOSPADM

## 2025-07-14 RX ORDER — POLYETHYLENE GLYCOL 3350 17 G/17G
17 POWDER, FOR SOLUTION ORAL DAILY PRN
Status: DISCONTINUED | OUTPATIENT
Start: 2025-07-14 | End: 2025-07-18

## 2025-07-14 RX ORDER — PANTOPRAZOLE SODIUM 40 MG/10ML
80 INJECTION, POWDER, LYOPHILIZED, FOR SOLUTION INTRAVENOUS
Status: DISCONTINUED | OUTPATIENT
Start: 2025-07-15 | End: 2025-07-15

## 2025-07-14 RX ORDER — ATORVASTATIN CALCIUM 40 MG/1
40 TABLET, FILM COATED ORAL DAILY
COMMUNITY

## 2025-07-14 RX ORDER — IOPAMIDOL 755 MG/ML
75 INJECTION, SOLUTION INTRAVASCULAR
Status: COMPLETED | OUTPATIENT
Start: 2025-07-14 | End: 2025-07-14

## 2025-07-14 RX ORDER — GLUCAGON 1 MG/ML
1 KIT INJECTION PRN
Status: DISCONTINUED | OUTPATIENT
Start: 2025-07-14 | End: 2025-07-21 | Stop reason: HOSPADM

## 2025-07-14 RX ORDER — MYCOPHENOLATE MOFETIL 250 MG/1
1000 CAPSULE ORAL 2 TIMES DAILY
Status: DISCONTINUED | OUTPATIENT
Start: 2025-07-14 | End: 2025-07-21 | Stop reason: HOSPADM

## 2025-07-14 RX ORDER — SODIUM CHLORIDE 0.9 % (FLUSH) 0.9 %
5-40 SYRINGE (ML) INJECTION EVERY 12 HOURS SCHEDULED
Status: DISCONTINUED | OUTPATIENT
Start: 2025-07-14 | End: 2025-07-21 | Stop reason: HOSPADM

## 2025-07-14 RX ORDER — TACROLIMUS 1 MG/1
2 CAPSULE ORAL
Status: DISCONTINUED | OUTPATIENT
Start: 2025-07-14 | End: 2025-07-21 | Stop reason: HOSPADM

## 2025-07-14 RX ORDER — CITALOPRAM HYDROBROMIDE 20 MG/1
20 TABLET ORAL NIGHTLY
Status: DISCONTINUED | OUTPATIENT
Start: 2025-07-14 | End: 2025-07-21 | Stop reason: HOSPADM

## 2025-07-14 RX ORDER — INSULIN GLARGINE 100 [IU]/ML
20 INJECTION, SOLUTION SUBCUTANEOUS 2 TIMES DAILY
Status: DISCONTINUED | OUTPATIENT
Start: 2025-07-14 | End: 2025-07-20

## 2025-07-14 RX ADMIN — PANTOPRAZOLE SODIUM 80 MG: 40 INJECTION, POWDER, FOR SOLUTION INTRAVENOUS at 20:59

## 2025-07-14 RX ADMIN — SODIUM CHLORIDE 1000 ML: 0.9 INJECTION, SOLUTION INTRAVENOUS at 17:54

## 2025-07-14 RX ADMIN — IOPAMIDOL 75 ML: 755 INJECTION, SOLUTION INTRAVENOUS at 17:34

## 2025-07-14 RX ADMIN — MECLIZINE HYDROCHLORIDE 25 MG: 12.5 TABLET ORAL at 18:43

## 2025-07-14 RX ADMIN — HYDROMORPHONE HYDROCHLORIDE 1 MG: 1 INJECTION, SOLUTION INTRAMUSCULAR; INTRAVENOUS; SUBCUTANEOUS at 18:43

## 2025-07-14 RX ADMIN — HYDROMORPHONE HYDROCHLORIDE 1 MG: 1 INJECTION, SOLUTION INTRAMUSCULAR; INTRAVENOUS; SUBCUTANEOUS at 22:18

## 2025-07-14 RX ADMIN — SODIUM CHLORIDE, SODIUM LACTATE, POTASSIUM CHLORIDE, AND CALCIUM CHLORIDE: .6; .31; .03; .02 INJECTION, SOLUTION INTRAVENOUS at 22:17

## 2025-07-14 ASSESSMENT — PAIN DESCRIPTION - LOCATION
LOCATION: ABDOMEN

## 2025-07-14 ASSESSMENT — PAIN DESCRIPTION - ORIENTATION
ORIENTATION: MID
ORIENTATION: MID
ORIENTATION: LOWER

## 2025-07-14 ASSESSMENT — PAIN DESCRIPTION - DESCRIPTORS
DESCRIPTORS: DULL
DESCRIPTORS: ACHING

## 2025-07-14 ASSESSMENT — PAIN SCALES - GENERAL
PAINLEVEL_OUTOF10: 7
PAINLEVEL_OUTOF10: 5
PAINLEVEL_OUTOF10: 7

## 2025-07-14 ASSESSMENT — PAIN - FUNCTIONAL ASSESSMENT
PAIN_FUNCTIONAL_ASSESSMENT: 0-10
PAIN_FUNCTIONAL_ASSESSMENT: ACTIVITIES ARE NOT PREVENTED

## 2025-07-14 ASSESSMENT — PAIN DESCRIPTION - PAIN TYPE: TYPE: ACUTE PAIN

## 2025-07-14 NOTE — ED PROVIDER NOTES
[facility] EMERGENCY DEPARTMENT  EMERGENCY DEPARTMENT ENCOUNTER      Pt Name: Jasmina Hahn  MRN: [id number]  Birthdate 1980  Date of evaluation: 7/14/2025  Provider: ОЛЕГ BELL DO    CHIEF COMPLAINT  Chief Complaint   Patient presents with    Hematemesis     Pt into Ed for Hematemesis. Pt states she has vomited blood 3 times today and once last week. Pt states she is also dizzy, shaky, and abdominal pain 7/10. Pt has hx kidney transplant and multiple hernias           This patient is at risk for a communicable infection.  Therefore, personal protection equipment consisting of a mask was worn for the exam.    HPI  Jasmina Hahn is a 45 y.o. female who presents with complaint of vomiting blood.  Started a week ago.  She vomited a large amount that filled the toilet with pure blood.  It stopped and she had no further concerns until today when she vomited twice with spots of blood in the toilet.  She denies a lightheaded or dizziness at this time.  She denies any black or tarry stools.  She denies any rectal bleeding.  She denies being on blood thinners.  She has a history of renal transplant in 2019.  She is still on immunosuppressive drugs.  She has a history of cholecystectomy and C-sections x 3.  She denies history of hysterectomy or appendectomy.  She has been shaky.  Again she denies any dizzy.    REVIEW OF SYSTEMS  All systems negative except as noted in the HPI.  Reviewed Nurses' notes and concur.    Patient's last menstrual period was 06/11/2025 (approximate).    PAST MEDICAL HISTORY  Past Medical History:   Diagnosis Date    Anemia     Chronic kidney disease     Diabetes mellitus (HCC)     Hypertension     Obesity        FAMILY HISTORY  Family History   Problem Relation Age of Onset    Diabetes Mother     Hypertension Mother     Other Maternal Grandmother         Rumatoid arthritis    Cancer Maternal Grandmother        SOCIAL HISTORY   reports that she has never smoked. She has

## 2025-07-15 ENCOUNTER — ANESTHESIA EVENT (OUTPATIENT)
Dept: ENDOSCOPY | Age: 45
End: 2025-07-15
Payer: COMMERCIAL

## 2025-07-15 ENCOUNTER — ANESTHESIA (OUTPATIENT)
Dept: ENDOSCOPY | Age: 45
End: 2025-07-15
Payer: COMMERCIAL

## 2025-07-15 LAB
ALBUMIN SERPL-MCNC: 3.8 G/DL (ref 3.4–5)
ALBUMIN/GLOB SERPL: 1 {RATIO} (ref 1.1–2.2)
ALP SERPL-CCNC: 81 U/L (ref 40–129)
ALT SERPL-CCNC: 16 U/L (ref 10–40)
ANION GAP SERPL CALCULATED.3IONS-SCNC: 12 MMOL/L (ref 3–16)
AST SERPL-CCNC: 17 U/L (ref 15–37)
BASOPHILS # BLD: 0 K/UL (ref 0–0.2)
BASOPHILS NFR BLD: 0.5 %
BILIRUB SERPL-MCNC: 0.7 MG/DL (ref 0–1)
BUN SERPL-MCNC: 6 MG/DL (ref 7–20)
CALCIUM SERPL-MCNC: 9 MG/DL (ref 8.3–10.6)
CHLORIDE SERPL-SCNC: 104 MMOL/L (ref 99–110)
CO2 SERPL-SCNC: 23 MMOL/L (ref 21–32)
CREAT SERPL-MCNC: 0.9 MG/DL (ref 0.6–1.1)
DEPRECATED RDW RBC AUTO: 16.3 % (ref 12.4–15.4)
EKG ATRIAL RATE: 98 BPM
EKG DIAGNOSIS: NORMAL
EKG P AXIS: 35 DEGREES
EKG P-R INTERVAL: 134 MS
EKG Q-T INTERVAL: 364 MS
EKG QRS DURATION: 86 MS
EKG QTC CALCULATION (BAZETT): 464 MS
EKG R AXIS: -12 DEGREES
EKG T AXIS: 40 DEGREES
EKG VENTRICULAR RATE: 98 BPM
EOSINOPHIL # BLD: 0.1 K/UL (ref 0–0.6)
EOSINOPHIL NFR BLD: 0.8 %
GFR SERPLBLD CREATININE-BSD FMLA CKD-EPI: 81 ML/MIN/{1.73_M2}
GLUCOSE BLD-MCNC: 114 MG/DL (ref 70–99)
GLUCOSE BLD-MCNC: 114 MG/DL (ref 70–99)
GLUCOSE BLD-MCNC: 83 MG/DL (ref 70–99)
GLUCOSE BLD-MCNC: 93 MG/DL (ref 70–99)
GLUCOSE BLD-MCNC: 95 MG/DL (ref 70–99)
GLUCOSE BLD-MCNC: 98 MG/DL (ref 70–99)
GLUCOSE SERPL-MCNC: 94 MG/DL (ref 70–99)
HCG UR QL: NEGATIVE
HCT VFR BLD AUTO: 34.2 % (ref 36–48)
HGB BLD-MCNC: 10.7 G/DL (ref 12–16)
LYMPHOCYTES # BLD: 1.2 K/UL (ref 1–5.1)
LYMPHOCYTES NFR BLD: 13.9 %
MCH RBC QN AUTO: 24.6 PG (ref 26–34)
MCHC RBC AUTO-ENTMCNC: 31.2 G/DL (ref 31–36)
MCV RBC AUTO: 78.8 FL (ref 80–100)
MONOCYTES # BLD: 0.8 K/UL (ref 0–1.3)
MONOCYTES NFR BLD: 8.7 %
NEUTROPHILS # BLD: 6.6 K/UL (ref 1.7–7.7)
NEUTROPHILS NFR BLD: 76.1 %
PERFORMED ON: ABNORMAL
PERFORMED ON: ABNORMAL
PERFORMED ON: NORMAL
PLATELET # BLD AUTO: 296 K/UL (ref 135–450)
PMV BLD AUTO: 7.8 FL (ref 5–10.5)
POTASSIUM SERPL-SCNC: 3.9 MMOL/L (ref 3.5–5.1)
PROT SERPL-MCNC: 7.5 G/DL (ref 6.4–8.2)
RBC # BLD AUTO: 4.34 M/UL (ref 4–5.2)
SODIUM SERPL-SCNC: 139 MMOL/L (ref 136–145)
WBC # BLD AUTO: 8.7 K/UL (ref 4–11)

## 2025-07-15 PROCEDURE — 6360000002 HC RX W HCPCS: Performed by: NURSE PRACTITIONER

## 2025-07-15 PROCEDURE — 2709999900 HC NON-CHARGEABLE SUPPLY: Performed by: INTERNAL MEDICINE

## 2025-07-15 PROCEDURE — 97535 SELF CARE MNGMENT TRAINING: CPT

## 2025-07-15 PROCEDURE — 6370000000 HC RX 637 (ALT 250 FOR IP): Performed by: NURSE PRACTITIONER

## 2025-07-15 PROCEDURE — 7100000000 HC PACU RECOVERY - FIRST 15 MIN: Performed by: INTERNAL MEDICINE

## 2025-07-15 PROCEDURE — 2500000003 HC RX 250 WO HCPCS: Performed by: STUDENT IN AN ORGANIZED HEALTH CARE EDUCATION/TRAINING PROGRAM

## 2025-07-15 PROCEDURE — 85025 COMPLETE CBC W/AUTO DIFF WBC: CPT

## 2025-07-15 PROCEDURE — G0378 HOSPITAL OBSERVATION PER HR: HCPCS

## 2025-07-15 PROCEDURE — 6360000002 HC RX W HCPCS

## 2025-07-15 PROCEDURE — 80053 COMPREHEN METABOLIC PANEL: CPT

## 2025-07-15 PROCEDURE — 97161 PT EVAL LOW COMPLEX 20 MIN: CPT

## 2025-07-15 PROCEDURE — 2500000003 HC RX 250 WO HCPCS: Performed by: NURSE PRACTITIONER

## 2025-07-15 PROCEDURE — 94760 N-INVAS EAR/PLS OXIMETRY 1: CPT

## 2025-07-15 PROCEDURE — 93010 ELECTROCARDIOGRAM REPORT: CPT | Performed by: INTERNAL MEDICINE

## 2025-07-15 PROCEDURE — 7100000001 HC PACU RECOVERY - ADDTL 15 MIN: Performed by: INTERNAL MEDICINE

## 2025-07-15 PROCEDURE — 3609017100 HC EGD: Performed by: INTERNAL MEDICINE

## 2025-07-15 PROCEDURE — 3700000000 HC ANESTHESIA ATTENDED CARE: Performed by: INTERNAL MEDICINE

## 2025-07-15 PROCEDURE — 36415 COLL VENOUS BLD VENIPUNCTURE: CPT

## 2025-07-15 PROCEDURE — 0DJ08ZZ INSPECTION OF UPPER INTESTINAL TRACT, VIA NATURAL OR ARTIFICIAL OPENING ENDOSCOPIC: ICD-10-PCS | Performed by: INTERNAL MEDICINE

## 2025-07-15 PROCEDURE — 96376 TX/PRO/DX INJ SAME DRUG ADON: CPT

## 2025-07-15 PROCEDURE — 6370000000 HC RX 637 (ALT 250 FOR IP): Performed by: STUDENT IN AN ORGANIZED HEALTH CARE EDUCATION/TRAINING PROGRAM

## 2025-07-15 PROCEDURE — 6360000002 HC RX W HCPCS: Performed by: STUDENT IN AN ORGANIZED HEALTH CARE EDUCATION/TRAINING PROGRAM

## 2025-07-15 PROCEDURE — 97165 OT EVAL LOW COMPLEX 30 MIN: CPT

## 2025-07-15 PROCEDURE — 84703 CHORIONIC GONADOTROPIN ASSAY: CPT

## 2025-07-15 RX ORDER — ENTECAVIR 1 MG/1
0.5 TABLET, FILM COATED ORAL DAILY
Status: DISCONTINUED | OUTPATIENT
Start: 2025-07-15 | End: 2025-07-16

## 2025-07-15 RX ORDER — CYCLOBENZAPRINE HCL 10 MG
5 TABLET ORAL 3 TIMES DAILY PRN
Status: DISCONTINUED | OUTPATIENT
Start: 2025-07-15 | End: 2025-07-21 | Stop reason: HOSPADM

## 2025-07-15 RX ORDER — SODIUM CHLORIDE 0.9 % (FLUSH) 0.9 %
5-40 SYRINGE (ML) INJECTION PRN
Status: DISCONTINUED | OUTPATIENT
Start: 2025-07-15 | End: 2025-07-15 | Stop reason: HOSPADM

## 2025-07-15 RX ORDER — DIPHENHYDRAMINE HYDROCHLORIDE 50 MG/ML
12.5 INJECTION, SOLUTION INTRAMUSCULAR; INTRAVENOUS
Status: DISCONTINUED | OUTPATIENT
Start: 2025-07-15 | End: 2025-07-15 | Stop reason: HOSPADM

## 2025-07-15 RX ORDER — PROPOFOL 10 MG/ML
INJECTION, EMULSION INTRAVENOUS
Status: DISCONTINUED | OUTPATIENT
Start: 2025-07-15 | End: 2025-07-15 | Stop reason: SDUPTHER

## 2025-07-15 RX ORDER — BACLOFEN 10 MG/1
10 TABLET ORAL NIGHTLY PRN
Status: DISCONTINUED | OUTPATIENT
Start: 2025-07-15 | End: 2025-07-21 | Stop reason: HOSPADM

## 2025-07-15 RX ORDER — INSULIN LISPRO 100 [IU]/ML
0-8 INJECTION, SOLUTION INTRAVENOUS; SUBCUTANEOUS
Status: DISCONTINUED | OUTPATIENT
Start: 2025-07-15 | End: 2025-07-20

## 2025-07-15 RX ORDER — ATORVASTATIN CALCIUM 40 MG/1
40 TABLET, FILM COATED ORAL DAILY
Status: DISCONTINUED | OUTPATIENT
Start: 2025-07-15 | End: 2025-07-21 | Stop reason: HOSPADM

## 2025-07-15 RX ORDER — SODIUM CHLORIDE 0.9 % (FLUSH) 0.9 %
5-40 SYRINGE (ML) INJECTION EVERY 12 HOURS SCHEDULED
Status: DISCONTINUED | OUTPATIENT
Start: 2025-07-15 | End: 2025-07-15 | Stop reason: HOSPADM

## 2025-07-15 RX ORDER — LIDOCAINE HYDROCHLORIDE 20 MG/ML
INJECTION, SOLUTION EPIDURAL; INFILTRATION; INTRACAUDAL; PERINEURAL
Status: DISCONTINUED | OUTPATIENT
Start: 2025-07-15 | End: 2025-07-15 | Stop reason: SDUPTHER

## 2025-07-15 RX ORDER — GLYCOPYRROLATE 0.2 MG/ML
INJECTION INTRAMUSCULAR; INTRAVENOUS
Status: DISCONTINUED | OUTPATIENT
Start: 2025-07-15 | End: 2025-07-15 | Stop reason: SDUPTHER

## 2025-07-15 RX ORDER — ONDANSETRON 2 MG/ML
4 INJECTION INTRAMUSCULAR; INTRAVENOUS
Status: DISCONTINUED | OUTPATIENT
Start: 2025-07-15 | End: 2025-07-15 | Stop reason: HOSPADM

## 2025-07-15 RX ORDER — SODIUM CHLORIDE 9 MG/ML
INJECTION, SOLUTION INTRAVENOUS PRN
Status: DISCONTINUED | OUTPATIENT
Start: 2025-07-15 | End: 2025-07-15 | Stop reason: HOSPADM

## 2025-07-15 RX ADMIN — PROPOFOL 100 MG: 10 INJECTION, EMULSION INTRAVENOUS at 13:29

## 2025-07-15 RX ADMIN — HYDROMORPHONE HYDROCHLORIDE 1 MG: 1 INJECTION, SOLUTION INTRAMUSCULAR; INTRAVENOUS; SUBCUTANEOUS at 15:22

## 2025-07-15 RX ADMIN — ENTECAVIR 0.5 MG: 0.5 TABLET ORAL at 15:05

## 2025-07-15 RX ADMIN — PANTOPRAZOLE SODIUM 80 MG: 40 INJECTION, POWDER, FOR SOLUTION INTRAVENOUS at 06:58

## 2025-07-15 RX ADMIN — INSULIN GLARGINE 20 UNITS: 100 INJECTION, SOLUTION SUBCUTANEOUS at 00:11

## 2025-07-15 RX ADMIN — LIDOCAINE HYDROCHLORIDE 40 MG: 20 INJECTION, SOLUTION EPIDURAL; INFILTRATION; INTRACAUDAL; PERINEURAL at 13:26

## 2025-07-15 RX ADMIN — MYCOPHENOLATE MOFETIL 1000 MG: 250 CAPSULE ORAL at 08:10

## 2025-07-15 RX ADMIN — MYCOPHENOLATE MOFETIL 1000 MG: 250 CAPSULE ORAL at 00:10

## 2025-07-15 RX ADMIN — HYDROMORPHONE HYDROCHLORIDE 1 MG: 1 INJECTION, SOLUTION INTRAMUSCULAR; INTRAVENOUS; SUBCUTANEOUS at 04:28

## 2025-07-15 RX ADMIN — TACROLIMUS 1 MG: 1 CAPSULE ORAL at 08:10

## 2025-07-15 RX ADMIN — PREGABALIN 25 MG: 25 CAPSULE ORAL at 00:10

## 2025-07-15 RX ADMIN — PROPOFOL 220 MCG/KG/MIN: 10 INJECTION, EMULSION INTRAVENOUS at 13:28

## 2025-07-15 RX ADMIN — TACROLIMUS 0.5 MG: 0.5 CAPSULE ORAL at 08:10

## 2025-07-15 RX ADMIN — PREGABALIN 25 MG: 25 CAPSULE ORAL at 14:26

## 2025-07-15 RX ADMIN — CITALOPRAM HYDROBROMIDE 20 MG: 20 TABLET ORAL at 00:10

## 2025-07-15 RX ADMIN — HYDROMORPHONE HYDROCHLORIDE 1 MG: 1 INJECTION, SOLUTION INTRAMUSCULAR; INTRAVENOUS; SUBCUTANEOUS at 19:41

## 2025-07-15 RX ADMIN — SODIUM CHLORIDE, PRESERVATIVE FREE 40 MG: 5 INJECTION INTRAVENOUS at 21:12

## 2025-07-15 RX ADMIN — TACROLIMUS 2 MG: 1 CAPSULE ORAL at 21:50

## 2025-07-15 RX ADMIN — PREGABALIN 25 MG: 25 CAPSULE ORAL at 21:12

## 2025-07-15 RX ADMIN — HYDROMORPHONE HYDROCHLORIDE 1 MG: 1 INJECTION, SOLUTION INTRAMUSCULAR; INTRAVENOUS; SUBCUTANEOUS at 10:51

## 2025-07-15 RX ADMIN — SODIUM CHLORIDE, PRESERVATIVE FREE 10 ML: 5 INJECTION INTRAVENOUS at 19:43

## 2025-07-15 RX ADMIN — PREGABALIN 25 MG: 25 CAPSULE ORAL at 08:10

## 2025-07-15 RX ADMIN — MYCOPHENOLATE MOFETIL 1000 MG: 250 CAPSULE ORAL at 21:12

## 2025-07-15 RX ADMIN — GLYCOPYRROLATE 0.1 MG: 0.2 INJECTION INTRAMUSCULAR; INTRAVENOUS at 13:26

## 2025-07-15 RX ADMIN — CITALOPRAM HYDROBROMIDE 20 MG: 20 TABLET ORAL at 21:12

## 2025-07-15 RX ADMIN — SODIUM CHLORIDE, PRESERVATIVE FREE 40 MG: 5 INJECTION INTRAVENOUS at 11:57

## 2025-07-15 RX ADMIN — AMLODIPINE BESYLATE 10 MG: 10 TABLET ORAL at 08:10

## 2025-07-15 RX ADMIN — LISINOPRIL 20 MG: 20 TABLET ORAL at 08:10

## 2025-07-15 RX ADMIN — TACROLIMUS 2 MG: 1 CAPSULE ORAL at 00:10

## 2025-07-15 RX ADMIN — ONDANSETRON 4 MG: 2 INJECTION, SOLUTION INTRAMUSCULAR; INTRAVENOUS at 15:42

## 2025-07-15 RX ADMIN — INSULIN GLARGINE 20 UNITS: 100 INJECTION, SOLUTION SUBCUTANEOUS at 08:10

## 2025-07-15 ASSESSMENT — PAIN DESCRIPTION - DESCRIPTORS
DESCRIPTORS: ACHING

## 2025-07-15 ASSESSMENT — PAIN DESCRIPTION - LOCATION
LOCATION: ABDOMEN

## 2025-07-15 ASSESSMENT — PAIN SCALES - GENERAL
PAINLEVEL_OUTOF10: 7
PAINLEVEL_OUTOF10: 7
PAINLEVEL_OUTOF10: 0
PAINLEVEL_OUTOF10: 2
PAINLEVEL_OUTOF10: 7
PAINLEVEL_OUTOF10: 5

## 2025-07-15 ASSESSMENT — LIFESTYLE VARIABLES: SMOKING_STATUS: 0

## 2025-07-15 ASSESSMENT — PAIN - FUNCTIONAL ASSESSMENT
PAIN_FUNCTIONAL_ASSESSMENT: 0-10
PAIN_FUNCTIONAL_ASSESSMENT: PREVENTS OR INTERFERES SOME ACTIVE ACTIVITIES AND ADLS
PAIN_FUNCTIONAL_ASSESSMENT: PREVENTS OR INTERFERES SOME ACTIVE ACTIVITIES AND ADLS

## 2025-07-15 ASSESSMENT — PAIN DESCRIPTION - ORIENTATION
ORIENTATION: MID
ORIENTATION: MID

## 2025-07-15 NOTE — CONSULTS
GASTROENTEROLOGY INPATIENT CONSULTATION        IDENTIFYING DATA/REASON FOR CONSULTATION   PATIENT:  Jasmina Hahn  MRN:  [id number]  ADMIT DATE: 2025  TIME OF EVALUATION: 7/15/2025 9:24 AM  HOSPITAL STAY:   LOS: 0 days     REASON FOR CONSULTATION: Hematemesis    HISTORY OF PRESENT ILLNESS   Jasmina Hahn is a 45 y.o. female with a PMH of diabetes mellitus, CKD, hypertension, anemia who presented on 2025 with hematemesis. We have been consulted regarding hematemesis.  Patient reports a week ago had a new onset of vomiting with spots of blood in the toilet.  She had a repeat episode yesterday which prompted her visit to the hospital.  Denies being on blood thinners.  Status post renal transplant .  Currently on immunosuppressive medications.  Denies fever chills melena hematochezia abdominal pain weight loss constipation diarrhea or any other symptoms.      Prior Endoscopic Evaluations:  Juancarlos Reyes MD 2018  Postoperative Diagnosis:  1. Normal EGD-Biopsied for Celiac Disease 2. Ascending Colon Polyp 3. Rectosigmoid colon polyp     PAST MEDICAL, SURGICAL, FAMILY, and SOCIAL HISTORY     Past Medical History:   Diagnosis Date    Anemia     Chronic kidney disease     Diabetes mellitus (HCC)     Hypertension     Obesity      Past Surgical History:   Procedure Laterality Date     SECTION  ,,    CHOLECYSTECTOMY      DIALYSIS FISTULA CREATION      ENDOSCOPY, COLON, DIAGNOSTIC  2018    Esophagogastroduodenoscopy with biopsy Colonoscopy with polypectomy (snare cautery)    HAND TENDON SURGERY      thumb-     TUBAL LIGATION      UMBILICAL HERNIA REPAIR       Family History   Problem Relation Age of Onset    Diabetes Mother     Hypertension Mother     Other Maternal Grandmother         Rumatoid arthritis    Cancer Maternal Grandmother      Social History     Socioeconomic History    Marital status:      Spouse name: Ama    Number of children: 3

## 2025-07-15 NOTE — ACP (ADVANCE CARE PLANNING)
Advance Care Planning   Healthcare Decision Maker:    Primary Decision Maker: Ama Hahn - Spouse - [phone]    Respectfully submitted,    Salma BRAXTON, ELBAS  [facility]  [phone]    Electronically signed by IRAIS Oakes, MARY on 7/15/2025 at 11:18 AM

## 2025-07-15 NOTE — PLAN OF CARE
Problem: Chronic Conditions and Co-morbidities  Goal: Patient's chronic conditions and co-morbidity symptoms are monitored and maintained or improved  7/15/2025 1514 by Belkis Morrow RN  Outcome: Progressing  7/15/2025 0718 by Wen Dan RN  Outcome: Not Progressing     Problem: Discharge Planning  Goal: Discharge to home or other facility with appropriate resources  7/15/2025 1514 by Belkis Morrow RN  Outcome: Progressing  7/15/2025 0718 by Wen Dan RN  Outcome: Not Progressing     Problem: Pain  Goal: Verbalizes/displays adequate comfort level or baseline comfort level  7/15/2025 1514 by Belkis Morrow RN  Outcome: Progressing  7/15/2025 0718 by Wen Dan RN  Outcome: Not Progressing     Problem: Safety - Adult  Goal: Free from fall injury  Outcome: Progressing     Problem: Chronic Conditions and Co-morbidities  Goal: Patient's chronic conditions and co-morbidity symptoms are monitored and maintained or improved  7/15/2025 1514 by Belkis Morrow RN  Outcome: Progressing  7/15/2025 0718 by Wen Dan RN  Outcome: Not Progressing     Problem: Discharge Planning  Goal: Discharge to home or other facility with appropriate resources  7/15/2025 1514 by Belkis Morrow RN  Outcome: Progressing  7/15/2025 0718 by Wen Dan RN  Outcome: Not Progressing     Problem: Pain  Goal: Verbalizes/displays adequate comfort level or baseline comfort level  7/15/2025 1514 by Belkis Morrow RN  Outcome: Progressing  7/15/2025 0718 by Wen Dan RN  Outcome: Not Progressing

## 2025-07-15 NOTE — H&P
above    Consult for admission completed via [employer] with ED provider  SDM/POC: Discussed face-to-face with patient and her spouse at bedside both were in agreement    Diet Diet NPO   DVT Prophylaxis [] Lovenox, []  Heparin, [x] SCDs, [] Ambulation   GI Prophylaxis [x] PPI,  [] H2 Blocker,  [] Carafate,  [] Diet/Tube Feeds   Code Status Prior   Disposition Patient requires continued admission due to serial labs, close observation and specialty consult   MDM [] Low, [] Moderate,[x]  High  Patient's risk as above due to potential for complicating sequela     History of Present Illness:     Chief Complaint: Hematemesis  Jasmina Hahn is a 45 y.o.  female  who presents with PMHx: Chronic anemia, CKD-> with kidney transplant, DM, HTN, obesity, saddle PE     HPI provided by the patient:    Patient presented to the emergency department for evaluation of at least 3 episodes of hematic emesis since yesterday.  Periumbilical epigastric abdominal discomfort since yesterday.  And last week reportedly had 1 single episode of bloody diarrhea that \"filled the toilet.\"  Denies alcohol or NSAID use.  No longer taking anticoagulation therapy.  Has never experienced an event like this prior.  Experienced dizziness during episodes of discomfort and the vomiting.    Currently denies chest pain, shortness of breath, lightheadedness, dizziness.  Does continue to report mid periumbilical epigastric abdominal discomfort.    Ten point ROS reviewed negative, unless as noted above    Objective:   No intake or output data in the 24 hours ending 07/14/25 2154   Vitals:   Vitals:    07/14/25 2100   BP: (!) 142/87   Pulse: (!) 110   Resp: 20   Temp:    SpO2: 99%     Physical Exam:   GEN Awake female, sitting upright in bed in no apparent distress. Appears given age.  Obese, body habitus may interfere with exam  EYES Pupils are equally round.  No scleral erythema, discharge, or conjunctivitis.  HENT Mucous membranes are moist. Oral pharynx

## 2025-07-15 NOTE — ANESTHESIA POSTPROCEDURE EVALUATION
Department of Anesthesiology  Postprocedure Note    Patient: Jasmina Hahn  MRN: [id number]  YOB: 1980  Date of evaluation: 7/15/2025    Procedure Summary       Date: 07/15/25 Room / Location: [place] [address] / [facility]    Anesthesia Start: 1323 Anesthesia Stop: 1341    Procedure: ESOPHAGOGASTRODUODENOSCOPY Diagnosis: Hematemesis, unspecified whether nausea present    Surgeons: Juancarlos Reyes MD Responsible Provider: Quintin Garsia MD    Anesthesia Type: MAC ASA Status: 4            Anesthesia Type: No value filed.    Kaiden Phase I: Kaiden Score: 10    Kaiden Phase II:      Anesthesia Post Evaluation    Patient location during evaluation: bedside  Patient participation: complete - patient participated  Level of consciousness: awake and alert  Pain score: 0  Nausea & Vomiting: no nausea  Cardiovascular status: hemodynamically stable  Respiratory status: acceptable  Hydration status: stable  Pain management: adequate    No notable events documented.

## 2025-07-15 NOTE — OP NOTE
Endoscopy Note    Patient: Jasmina Hahn  : 1980  Acct#:     Procedure: Esophagogastroduodenoscopy                         Date:  7/15/2025     Surgeon:   Juancarlos Reyes MD    Referring Physician:  Cassie Harman MD    Indications: This is a 45 y.o. year old female who presents today with Hematemesis     Postoperative Diagnosis:  1. Gastric Sleeve 2. Gastric Polyps    Anesthesia:  The patient was administered IV propofol per anesthesiology team.  Please see their operative records for full details.      Consent:  The patient or their legal guardian has signed an informed consent, and is aware of the potential risks, benefits, alternatives, and potential complications of this procedure.  These include, but are not limited to hemorrhage, bleeding, post procedural pain, perforation, phlebitis, aspiration, hypotension, hypoxia, cardiovascular events such as arryhthmia, and possibly death.     Description of Procedure: The patient was then taken to the endoscopy suite, placed in the left lateral decubitus position and the above IV sedation was administrered.    The Olympus video endoscope was placed through the patient's oropharynx without difficulty to the extent of the 2nd portion of the duodenum.  Both forward and retroflexed views of the stomach were obtained.      Findings:    Esophagus: The esophagus appeared normal without evidence of Hartley's esophagus or reflux esophagitis.     Stomach: The stomach had evidence of gastric sleeve and several benign appearing gastric polyps. The stomach otherwise appeared normal on forward and retroflexed views    Duodenum: The first and 2nd portions of the duodenum appeared normal with normal villous pattern    Estimated Blood Loss (mL): None    Complications: None    Specimens: * No specimens in log *    The scope was then withdrawn back into the stomach, it was decompressed, and the scope was completely withdrawn.    The patient tolerated the

## 2025-07-15 NOTE — CARE COORDINATION
Case Management Assessment  Initial Evaluation    Date/Time of Evaluation: 7/15/2025 11:20 AM  Assessment Completed by: IRAIS Oakes, REJIW    If patient is discharged prior to next notation, then this note serves as note for discharge by case management.    Patient Name: Jasmina Hahn                   YOB: 1980  Diagnosis: Hematemesis [K92.0]  Vertigo [R42]  History of kidney transplant [Z94.0]  Immunosuppressed status [D84.9]  Hematemesis with nausea [K92.0]                   Date / Time: 7/14/2025  4:46 PM    Patient Admission Status: Observation   Readmission Risk (Low < 19, Mod (19-27), High > 27): Readmission Risk Score: 12.9    Current PCP: Cassie Harman MD  PCP verified by CM? Yes    Chart Reviewed: Yes      History Provided by: Patient  Patient Orientation: Alert and Oriented    Patient Cognition: Alert    Hospitalization in the last 30 days (Readmission):  No    If yes, Readmission Assessment in  Navigator will be completed.    Advance Directives:      Code Status: Full Code   Patient's Primary Decision Maker is: Legal Next of Kin    Primary Decision Maker: Ama Hahn - Spouse - [phone]    Discharge Planning:    Patient lives with: Spouse/Significant Other, Other (Comment) (pets, has two dogs.) Type of Home: House  Primary Care Giver: Self  Patient Support Systems include: Spouse/Significant Other, Other (Comment) (pets, has two dogs.)   Current Financial resources: Other (Comment) (commercial insurance.)  Current community resources: None  Current services prior to admission: Durable Medical Equipment            Current DME: Cane, Shower Chair            Type of Home Care services:  None    ADLS  Prior functional level: Independent in ADLs/IADLs  Current functional level: Independent in ADLs/IADLs    PT AM-PAC:   /24  OT AM-PAC:   /24    Family can provide assistance at DC: Yes  Would you like Case Management to discuss the discharge plan with any other family

## 2025-07-15 NOTE — ED NOTES
ED TO INPATIENT SBAR HANDOFF    Patient Name: Jasmina Hahn   Preferred Name: Jasmina  : 1980  45 y.o.   Family/Caregiver Present: no   Code Status Order: Prior  PO Status: NPO:Yes  Telemetry Order: Yes  C-SSRS: Risk of Suicide: No Risk  Sitter no NA  Restraints:     Sepsis Risk Score      Situation  Chief Complaint   Patient presents with    Hematemesis     Pt into Ed for Hematemesis. Pt states she has vomited blood 3 times today and once last week. Pt states she is also dizzy, shaky, and abdominal pain 7/10. Pt has hx kidney transplant and multiple hernias       Brief Description of Patient's Condition: hematemesis w/nausea immunosuppressed  Mental Status: oriented, alert, coherent, logical, thought processes intact, and able to concentrate and follow conversation  Arrived from:Home  Imaging:   CT ABDOMEN PELVIS W IV CONTRAST Additional Contrast? None   Final Result   1. No acute intra-abdominal or pelvic process.   2. Stable midline anterior abdominal wall hernia.   3. Stable transplant kidney in the right hemipelvis.         CTA HEAD NECK W CONTRAST   Final Result   No evidence of large artery occlusion or significant stenosis in the head or   neck.         CT HEAD WO CONTRAST   Final Result   No acute intracranial abnormality.           Abnormal labs:   Abnormal Labs Reviewed   CBC WITH AUTO DIFFERENTIAL - Abnormal; Notable for the following components:       Result Value    Hemoglobin 11.4 (*)     Hematocrit 35.9 (*)     MCV 78.0 (*)     MCH 24.8 (*)     RDW 16.1 (*)     All other components within normal limits   URINALYSIS WITH REFLEX TO CULTURE - Abnormal; Notable for the following components:    Protein, UA TRACE (*)     All other components within normal limits       Background  Allergies:   Allergies   Allergen Reactions    Hydrocodone-Acetaminophen Itching    Iv Dye [Iodides] Other (See Comments)     Not to use due to kidney transplant    Naproxen Hives    Nsaids      History:   Past Medical

## 2025-07-15 NOTE — ANESTHESIA PRE PROCEDURE
a history and physical.    DOS STAFF ADDENDUM:    Pt seen and examined, chart reviewed (including anesthesia, drug and allergy history).  No interval changes to history and physical examination.  Anesthetic plan, risks, benefits, alternatives, and personnel involved discussed with patient.  Patient verbalized an understanding and agrees to proceed.      Quintin Garsia MD  July 15, 2025  1:19 PM

## 2025-07-15 NOTE — CONSENT
Informed Consent for Blood Component Transfusion Note    I have discussed with the patient the rationale for blood component transfusion; its benefits in treating or preventing fatigue, organ damage, or death; and its risk which includes mild transfusion reactions, rare risk of blood borne infection, or more serious but rare reactions. I have discussed the alternatives to transfusion, including the risk and consequences of not receiving transfusion. The patient had an opportunity to ask questions and had agreed to proceed with transfusion of blood components.    Electronically signed by ARISTIDES Palmer CNP on 7/14/25 at 11:24 PM EDT

## 2025-07-16 PROBLEM — R10.9 INTRACTABLE ABDOMINAL PAIN: Status: ACTIVE | Noted: 2025-07-16

## 2025-07-16 LAB
ALBUMIN SERPL-MCNC: 3.7 G/DL (ref 3.4–5)
ALP SERPL-CCNC: 84 U/L (ref 40–129)
ALT SERPL-CCNC: 43 U/L (ref 10–40)
ANION GAP SERPL CALCULATED.3IONS-SCNC: 10 MMOL/L (ref 3–16)
AST SERPL-CCNC: 49 U/L (ref 15–37)
BASOPHILS # BLD: 0 K/UL (ref 0–0.2)
BASOPHILS NFR BLD: 0.6 %
BILIRUB DIRECT SERPL-MCNC: 0.3 MG/DL (ref 0–0.3)
BILIRUB INDIRECT SERPL-MCNC: 0.4 MG/DL (ref 0–1)
BILIRUB SERPL-MCNC: 0.7 MG/DL (ref 0–1)
BUN SERPL-MCNC: 6 MG/DL (ref 7–20)
CALCIUM SERPL-MCNC: 9.2 MG/DL (ref 8.3–10.6)
CHLORIDE SERPL-SCNC: 104 MMOL/L (ref 99–110)
CO2 SERPL-SCNC: 25 MMOL/L (ref 21–32)
CREAT SERPL-MCNC: 1 MG/DL (ref 0.6–1.1)
DEPRECATED RDW RBC AUTO: 16.2 % (ref 12.4–15.4)
EOSINOPHIL # BLD: 0.1 K/UL (ref 0–0.6)
EOSINOPHIL NFR BLD: 1.6 %
GFR SERPLBLD CREATININE-BSD FMLA CKD-EPI: 71 ML/MIN/{1.73_M2}
GLUCOSE BLD-MCNC: 109 MG/DL (ref 70–99)
GLUCOSE BLD-MCNC: 117 MG/DL (ref 70–99)
GLUCOSE BLD-MCNC: 180 MG/DL (ref 70–99)
GLUCOSE BLD-MCNC: 99 MG/DL (ref 70–99)
GLUCOSE SERPL-MCNC: 122 MG/DL (ref 70–99)
HCT VFR BLD AUTO: 33 % (ref 36–48)
HGB BLD-MCNC: 10.7 G/DL (ref 12–16)
LIPASE SERPL-CCNC: 32 U/L (ref 13–60)
LYMPHOCYTES # BLD: 1 K/UL (ref 1–5.1)
LYMPHOCYTES NFR BLD: 16.9 %
MCH RBC QN AUTO: 25.1 PG (ref 26–34)
MCHC RBC AUTO-ENTMCNC: 32.5 G/DL (ref 31–36)
MCV RBC AUTO: 77.3 FL (ref 80–100)
MONOCYTES # BLD: 0.7 K/UL (ref 0–1.3)
MONOCYTES NFR BLD: 11.7 %
NEUTROPHILS # BLD: 4.2 K/UL (ref 1.7–7.7)
NEUTROPHILS NFR BLD: 69.2 %
PERFORMED ON: ABNORMAL
PERFORMED ON: NORMAL
PLATELET # BLD AUTO: 265 K/UL (ref 135–450)
PMV BLD AUTO: 7.7 FL (ref 5–10.5)
POTASSIUM SERPL-SCNC: 4.1 MMOL/L (ref 3.5–5.1)
PROT SERPL-MCNC: 7.1 G/DL (ref 6.4–8.2)
RBC # BLD AUTO: 4.27 M/UL (ref 4–5.2)
SODIUM SERPL-SCNC: 139 MMOL/L (ref 136–145)
WBC # BLD AUTO: 6 K/UL (ref 4–11)

## 2025-07-16 PROCEDURE — 1200000000 HC SEMI PRIVATE

## 2025-07-16 PROCEDURE — 96376 TX/PRO/DX INJ SAME DRUG ADON: CPT

## 2025-07-16 PROCEDURE — 2500000003 HC RX 250 WO HCPCS: Performed by: NURSE PRACTITIONER

## 2025-07-16 PROCEDURE — 6370000000 HC RX 637 (ALT 250 FOR IP): Performed by: NURSE PRACTITIONER

## 2025-07-16 PROCEDURE — 80076 HEPATIC FUNCTION PANEL: CPT

## 2025-07-16 PROCEDURE — 6360000002 HC RX W HCPCS: Performed by: NURSE PRACTITIONER

## 2025-07-16 PROCEDURE — 36415 COLL VENOUS BLD VENIPUNCTURE: CPT

## 2025-07-16 PROCEDURE — 85025 COMPLETE CBC W/AUTO DIFF WBC: CPT

## 2025-07-16 PROCEDURE — 6370000000 HC RX 637 (ALT 250 FOR IP): Performed by: STUDENT IN AN ORGANIZED HEALTH CARE EDUCATION/TRAINING PROGRAM

## 2025-07-16 PROCEDURE — 94760 N-INVAS EAR/PLS OXIMETRY 1: CPT

## 2025-07-16 PROCEDURE — 83690 ASSAY OF LIPASE: CPT

## 2025-07-16 PROCEDURE — 80048 BASIC METABOLIC PNL TOTAL CA: CPT

## 2025-07-16 RX ORDER — OXYCODONE HYDROCHLORIDE 5 MG/1
5 TABLET ORAL EVERY 4 HOURS PRN
Refills: 0 | Status: DISCONTINUED | OUTPATIENT
Start: 2025-07-16 | End: 2025-07-21 | Stop reason: HOSPADM

## 2025-07-16 RX ORDER — OXYCODONE HYDROCHLORIDE 10 MG/1
10 TABLET ORAL EVERY 4 HOURS PRN
Refills: 0 | Status: DISCONTINUED | OUTPATIENT
Start: 2025-07-16 | End: 2025-07-21 | Stop reason: HOSPADM

## 2025-07-16 RX ORDER — PANTOPRAZOLE SODIUM 40 MG/1
40 TABLET, DELAYED RELEASE ORAL 2 TIMES DAILY
Status: DISCONTINUED | OUTPATIENT
Start: 2025-07-16 | End: 2025-07-21 | Stop reason: HOSPADM

## 2025-07-16 RX ORDER — ENTECAVIR 0.5 MG/1
0.5 TABLET, FILM COATED ORAL DAILY
Status: DISCONTINUED | OUTPATIENT
Start: 2025-07-16 | End: 2025-07-21 | Stop reason: HOSPADM

## 2025-07-16 RX ORDER — DIPHENHYDRAMINE HYDROCHLORIDE 50 MG/ML
25 INJECTION, SOLUTION INTRAMUSCULAR; INTRAVENOUS EVERY 6 HOURS PRN
Status: DISCONTINUED | OUTPATIENT
Start: 2025-07-16 | End: 2025-07-21 | Stop reason: HOSPADM

## 2025-07-16 RX ORDER — OXYCODONE HYDROCHLORIDE 5 MG/1
5 TABLET ORAL EVERY 4 HOURS PRN
Refills: 0 | Status: DISCONTINUED | OUTPATIENT
Start: 2025-07-16 | End: 2025-07-16

## 2025-07-16 RX ADMIN — OXYCODONE HYDROCHLORIDE 10 MG: 10 TABLET ORAL at 21:56

## 2025-07-16 RX ADMIN — TACROLIMUS 2 MG: 1 CAPSULE ORAL at 21:57

## 2025-07-16 RX ADMIN — TACROLIMUS 0.5 MG: 0.5 CAPSULE ORAL at 09:20

## 2025-07-16 RX ADMIN — OXYCODONE HYDROCHLORIDE 10 MG: 10 TABLET ORAL at 16:38

## 2025-07-16 RX ADMIN — MYCOPHENOLATE MOFETIL 1000 MG: 250 CAPSULE ORAL at 09:21

## 2025-07-16 RX ADMIN — MYCOPHENOLATE MOFETIL 1000 MG: 250 CAPSULE ORAL at 21:57

## 2025-07-16 RX ADMIN — SODIUM CHLORIDE, PRESERVATIVE FREE 10 ML: 5 INJECTION INTRAVENOUS at 21:57

## 2025-07-16 RX ADMIN — ENTECAVIR 0.5 MG: 0.5 TABLET ORAL at 09:20

## 2025-07-16 RX ADMIN — PREGABALIN 25 MG: 25 CAPSULE ORAL at 09:20

## 2025-07-16 RX ADMIN — OXYCODONE HYDROCHLORIDE 10 MG: 10 TABLET ORAL at 11:49

## 2025-07-16 RX ADMIN — INSULIN LISPRO 2 UNITS: 100 INJECTION, SOLUTION INTRAVENOUS; SUBCUTANEOUS at 21:57

## 2025-07-16 RX ADMIN — AMLODIPINE BESYLATE 10 MG: 10 TABLET ORAL at 09:20

## 2025-07-16 RX ADMIN — HYDROMORPHONE HYDROCHLORIDE 1 MG: 1 INJECTION, SOLUTION INTRAMUSCULAR; INTRAVENOUS; SUBCUTANEOUS at 05:05

## 2025-07-16 RX ADMIN — ATORVASTATIN CALCIUM 40 MG: 40 TABLET, FILM COATED ORAL at 09:20

## 2025-07-16 RX ADMIN — PREGABALIN 25 MG: 25 CAPSULE ORAL at 14:12

## 2025-07-16 RX ADMIN — HYDROMORPHONE HYDROCHLORIDE 1 MG: 1 INJECTION, SOLUTION INTRAMUSCULAR; INTRAVENOUS; SUBCUTANEOUS at 08:20

## 2025-07-16 RX ADMIN — CITALOPRAM HYDROBROMIDE 20 MG: 20 TABLET ORAL at 21:57

## 2025-07-16 RX ADMIN — TACROLIMUS 1 MG: 1 CAPSULE ORAL at 09:20

## 2025-07-16 RX ADMIN — SODIUM CHLORIDE, PRESERVATIVE FREE 10 ML: 5 INJECTION INTRAVENOUS at 09:23

## 2025-07-16 RX ADMIN — LISINOPRIL 20 MG: 20 TABLET ORAL at 09:20

## 2025-07-16 RX ADMIN — HYDROMORPHONE HYDROCHLORIDE 1 MG: 1 INJECTION, SOLUTION INTRAMUSCULAR; INTRAVENOUS; SUBCUTANEOUS at 01:03

## 2025-07-16 RX ADMIN — PANTOPRAZOLE SODIUM 40 MG: 40 TABLET, DELAYED RELEASE ORAL at 09:22

## 2025-07-16 RX ADMIN — PANTOPRAZOLE SODIUM 40 MG: 40 TABLET, DELAYED RELEASE ORAL at 21:57

## 2025-07-16 RX ADMIN — PREGABALIN 25 MG: 25 CAPSULE ORAL at 21:57

## 2025-07-16 ASSESSMENT — PAIN SCALES - GENERAL
PAINLEVEL_OUTOF10: 5
PAINLEVEL_OUTOF10: 7
PAINLEVEL_OUTOF10: 9
PAINLEVEL_OUTOF10: 9
PAINLEVEL_OUTOF10: 7
PAINLEVEL_OUTOF10: 10
PAINLEVEL_OUTOF10: 5
PAINLEVEL_OUTOF10: 9
PAINLEVEL_OUTOF10: 8
PAINLEVEL_OUTOF10: 8

## 2025-07-16 ASSESSMENT — PAIN SCALES - WONG BAKER: WONGBAKER_NUMERICALRESPONSE: NO HURT

## 2025-07-16 ASSESSMENT — PAIN DESCRIPTION - ONSET
ONSET: ON-GOING
ONSET: ON-GOING

## 2025-07-16 ASSESSMENT — PAIN DESCRIPTION - ORIENTATION
ORIENTATION: RIGHT
ORIENTATION: RIGHT;MID

## 2025-07-16 ASSESSMENT — PAIN DESCRIPTION - LOCATION
LOCATION: ABDOMEN

## 2025-07-16 ASSESSMENT — PAIN DESCRIPTION - FREQUENCY
FREQUENCY: CONTINUOUS
FREQUENCY: CONTINUOUS

## 2025-07-16 ASSESSMENT — PAIN DESCRIPTION - DESCRIPTORS
DESCRIPTORS: ACHING
DESCRIPTORS: ACHING
DESCRIPTORS: DISCOMFORT
DESCRIPTORS: ACHING
DESCRIPTORS: ACHING

## 2025-07-16 ASSESSMENT — PAIN DESCRIPTION - PAIN TYPE
TYPE: ACUTE PAIN
TYPE: ACUTE PAIN

## 2025-07-16 NOTE — PLAN OF CARE
Problem: Chronic Conditions and Co-morbidities  Goal: Patient's chronic conditions and co-morbidity symptoms are monitored and maintained or improved  7/16/2025 1126 by Belkis Morrow RN  Outcome: Progressing  7/15/2025 2308 by Naga Rome RN  Outcome: Progressing     Problem: Discharge Planning  Goal: Discharge to home or other facility with appropriate resources  7/16/2025 1126 by Belkis Morrow RN  Outcome: Progressing  7/15/2025 2308 by Naga Rome RN  Outcome: Progressing     Problem: Pain  Goal: Verbalizes/displays adequate comfort level or baseline comfort level  7/16/2025 1126 by Belkis Morrow RN  Outcome: Progressing  7/15/2025 2308 by Naga Rome RN  Outcome: Progressing     Problem: Safety - Adult  Goal: Free from fall injury  7/16/2025 1126 by Belkis Morrow RN  Outcome: Progressing  7/15/2025 2308 by Naga Rome RN  Outcome: Progressing

## 2025-07-16 NOTE — PLAN OF CARE
Problem: Chronic Conditions and Co-morbidities  Goal: Patient's chronic conditions and co-morbidity symptoms are monitored and maintained or improved  7/15/2025 2308 by Naga Rome RN  Outcome: Progressing     Problem: Discharge Planning  Goal: Discharge to home or other facility with appropriate resources  7/15/2025 2308 by Naga Rome RN  Outcome: Progressing     Problem: Pain  Goal: Verbalizes/displays adequate comfort level or baseline comfort level  7/15/2025 2308 by Naga Rome RN  Outcome: Progressing     Problem: Safety - Adult  Goal: Free from fall injury  7/15/2025 2308 by Naga Rome RN  Outcome: Progressing

## 2025-07-17 ENCOUNTER — APPOINTMENT (OUTPATIENT)
Dept: ULTRASOUND IMAGING | Age: 45
DRG: 378 | End: 2025-07-17
Payer: COMMERCIAL

## 2025-07-17 ENCOUNTER — APPOINTMENT (OUTPATIENT)
Dept: CT IMAGING | Age: 45
DRG: 378 | End: 2025-07-17
Payer: COMMERCIAL

## 2025-07-17 LAB
ALBUMIN SERPL-MCNC: 3.5 G/DL (ref 3.4–5)
ALP SERPL-CCNC: 81 U/L (ref 40–129)
ALT SERPL-CCNC: 46 U/L (ref 10–40)
ANION GAP SERPL CALCULATED.3IONS-SCNC: 11 MMOL/L (ref 3–16)
AST SERPL-CCNC: 32 U/L (ref 15–37)
BASOPHILS # BLD: 0 K/UL (ref 0–0.2)
BASOPHILS NFR BLD: 0.7 %
BILIRUB DIRECT SERPL-MCNC: 0.3 MG/DL (ref 0–0.3)
BILIRUB INDIRECT SERPL-MCNC: 0.3 MG/DL (ref 0–1)
BILIRUB SERPL-MCNC: 0.6 MG/DL (ref 0–1)
BUN SERPL-MCNC: 8 MG/DL (ref 7–20)
CALCIUM SERPL-MCNC: 9.1 MG/DL (ref 8.3–10.6)
CHLORIDE SERPL-SCNC: 105 MMOL/L (ref 99–110)
CO2 SERPL-SCNC: 23 MMOL/L (ref 21–32)
CREAT SERPL-MCNC: 1 MG/DL (ref 0.6–1.1)
DEPRECATED RDW RBC AUTO: 15.7 % (ref 12.4–15.4)
EOSINOPHIL # BLD: 0.1 K/UL (ref 0–0.6)
EOSINOPHIL NFR BLD: 1.4 %
GFR SERPLBLD CREATININE-BSD FMLA CKD-EPI: 71 ML/MIN/{1.73_M2}
GLUCOSE BLD-MCNC: 122 MG/DL (ref 70–99)
GLUCOSE BLD-MCNC: 138 MG/DL (ref 70–99)
GLUCOSE BLD-MCNC: 151 MG/DL (ref 70–99)
GLUCOSE BLD-MCNC: 200 MG/DL (ref 70–99)
GLUCOSE SERPL-MCNC: 117 MG/DL (ref 70–99)
HCT VFR BLD AUTO: 31.8 % (ref 36–48)
HGB BLD-MCNC: 10.4 G/DL (ref 12–16)
LYMPHOCYTES # BLD: 1 K/UL (ref 1–5.1)
LYMPHOCYTES NFR BLD: 17 %
MCH RBC QN AUTO: 25.3 PG (ref 26–34)
MCHC RBC AUTO-ENTMCNC: 32.7 G/DL (ref 31–36)
MCV RBC AUTO: 77.4 FL (ref 80–100)
MONOCYTES # BLD: 0.6 K/UL (ref 0–1.3)
MONOCYTES NFR BLD: 9.4 %
NEUTROPHILS # BLD: 4.2 K/UL (ref 1.7–7.7)
NEUTROPHILS NFR BLD: 71.5 %
PERFORMED ON: ABNORMAL
PLATELET # BLD AUTO: 268 K/UL (ref 135–450)
PMV BLD AUTO: 8 FL (ref 5–10.5)
POTASSIUM SERPL-SCNC: 4.1 MMOL/L (ref 3.5–5.1)
PROT SERPL-MCNC: 6.8 G/DL (ref 6.4–8.2)
RBC # BLD AUTO: 4.11 M/UL (ref 4–5.2)
SODIUM SERPL-SCNC: 139 MMOL/L (ref 136–145)
WBC # BLD AUTO: 5.9 K/UL (ref 4–11)

## 2025-07-17 PROCEDURE — 76705 ECHO EXAM OF ABDOMEN: CPT

## 2025-07-17 PROCEDURE — 80076 HEPATIC FUNCTION PANEL: CPT

## 2025-07-17 PROCEDURE — 6370000000 HC RX 637 (ALT 250 FOR IP): Performed by: INTERNAL MEDICINE

## 2025-07-17 PROCEDURE — 85025 COMPLETE CBC W/AUTO DIFF WBC: CPT

## 2025-07-17 PROCEDURE — 6370000000 HC RX 637 (ALT 250 FOR IP): Performed by: STUDENT IN AN ORGANIZED HEALTH CARE EDUCATION/TRAINING PROGRAM

## 2025-07-17 PROCEDURE — 6360000002 HC RX W HCPCS: Performed by: INTERNAL MEDICINE

## 2025-07-17 PROCEDURE — 80048 BASIC METABOLIC PNL TOTAL CA: CPT

## 2025-07-17 PROCEDURE — 74176 CT ABD & PELVIS W/O CONTRAST: CPT

## 2025-07-17 PROCEDURE — 1200000000 HC SEMI PRIVATE

## 2025-07-17 PROCEDURE — 36415 COLL VENOUS BLD VENIPUNCTURE: CPT

## 2025-07-17 PROCEDURE — 2500000003 HC RX 250 WO HCPCS: Performed by: INTERNAL MEDICINE

## 2025-07-17 PROCEDURE — 94760 N-INVAS EAR/PLS OXIMETRY 1: CPT

## 2025-07-17 PROCEDURE — 6360000004 HC RX CONTRAST MEDICATION: Performed by: STUDENT IN AN ORGANIZED HEALTH CARE EDUCATION/TRAINING PROGRAM

## 2025-07-17 RX ORDER — IOPAMIDOL 612 MG/ML
50 INJECTION, SOLUTION INTRAVASCULAR
Status: COMPLETED | OUTPATIENT
Start: 2025-07-17 | End: 2025-07-17

## 2025-07-17 RX ADMIN — CITALOPRAM HYDROBROMIDE 20 MG: 20 TABLET ORAL at 20:49

## 2025-07-17 RX ADMIN — ATORVASTATIN CALCIUM 40 MG: 40 TABLET, FILM COATED ORAL at 09:54

## 2025-07-17 RX ADMIN — TACROLIMUS 2 MG: 1 CAPSULE ORAL at 20:49

## 2025-07-17 RX ADMIN — MYCOPHENOLATE MOFETIL 1000 MG: 250 CAPSULE ORAL at 20:50

## 2025-07-17 RX ADMIN — OXYCODONE HYDROCHLORIDE 10 MG: 10 TABLET ORAL at 09:54

## 2025-07-17 RX ADMIN — OXYCODONE HYDROCHLORIDE 10 MG: 10 TABLET ORAL at 05:34

## 2025-07-17 RX ADMIN — PREGABALIN 25 MG: 25 CAPSULE ORAL at 09:54

## 2025-07-17 RX ADMIN — LISINOPRIL 20 MG: 20 TABLET ORAL at 09:54

## 2025-07-17 RX ADMIN — DIPHENHYDRAMINE HYDROCHLORIDE 25 MG: 50 INJECTION INTRAMUSCULAR; INTRAVENOUS at 17:28

## 2025-07-17 RX ADMIN — DIPHENHYDRAMINE HYDROCHLORIDE 25 MG: 50 INJECTION INTRAMUSCULAR; INTRAVENOUS at 22:34

## 2025-07-17 RX ADMIN — ENTECAVIR 0.5 MG: 0.5 TABLET ORAL at 09:58

## 2025-07-17 RX ADMIN — TACROLIMUS 1 MG: 1 CAPSULE ORAL at 09:58

## 2025-07-17 RX ADMIN — PREGABALIN 25 MG: 25 CAPSULE ORAL at 20:49

## 2025-07-17 RX ADMIN — INSULIN LISPRO 2 UNITS: 100 INJECTION, SOLUTION INTRAVENOUS; SUBCUTANEOUS at 17:34

## 2025-07-17 RX ADMIN — POLYETHYLENE GLYCOL 3350 17 G: 17 POWDER, FOR SOLUTION ORAL at 16:45

## 2025-07-17 RX ADMIN — IOPAMIDOL 50 ML: 612 INJECTION, SOLUTION INTRAVENOUS at 15:41

## 2025-07-17 RX ADMIN — MYCOPHENOLATE MOFETIL 1000 MG: 250 CAPSULE ORAL at 09:59

## 2025-07-17 RX ADMIN — TACROLIMUS 0.5 MG: 0.5 CAPSULE ORAL at 09:58

## 2025-07-17 RX ADMIN — DIPHENHYDRAMINE HYDROCHLORIDE 25 MG: 50 INJECTION INTRAMUSCULAR; INTRAVENOUS at 11:05

## 2025-07-17 RX ADMIN — PREGABALIN 25 MG: 25 CAPSULE ORAL at 14:08

## 2025-07-17 RX ADMIN — PANTOPRAZOLE SODIUM 40 MG: 40 TABLET, DELAYED RELEASE ORAL at 09:54

## 2025-07-17 RX ADMIN — OXYCODONE HYDROCHLORIDE 10 MG: 10 TABLET ORAL at 22:33

## 2025-07-17 RX ADMIN — OXYCODONE HYDROCHLORIDE 10 MG: 10 TABLET ORAL at 16:21

## 2025-07-17 RX ADMIN — PANTOPRAZOLE SODIUM 40 MG: 40 TABLET, DELAYED RELEASE ORAL at 20:49

## 2025-07-17 RX ADMIN — SODIUM CHLORIDE, PRESERVATIVE FREE 10 ML: 5 INJECTION INTRAVENOUS at 11:09

## 2025-07-17 RX ADMIN — SODIUM CHLORIDE, PRESERVATIVE FREE 10 ML: 5 INJECTION INTRAVENOUS at 22:34

## 2025-07-17 RX ADMIN — AMLODIPINE BESYLATE 10 MG: 10 TABLET ORAL at 09:54

## 2025-07-17 ASSESSMENT — PAIN DESCRIPTION - ORIENTATION
ORIENTATION: LEFT
ORIENTATION: RIGHT
ORIENTATION: RIGHT
ORIENTATION: RIGHT;UPPER

## 2025-07-17 ASSESSMENT — PAIN SCALES - GENERAL
PAINLEVEL_OUTOF10: 8
PAINLEVEL_OUTOF10: 7
PAINLEVEL_OUTOF10: 6
PAINLEVEL_OUTOF10: 8
PAINLEVEL_OUTOF10: 7

## 2025-07-17 ASSESSMENT — PAIN DESCRIPTION - DESCRIPTORS
DESCRIPTORS: ACHING;SHARP
DESCRIPTORS: SHARP

## 2025-07-17 ASSESSMENT — PAIN SCALES - WONG BAKER: WONGBAKER_NUMERICALRESPONSE: NO HURT

## 2025-07-17 ASSESSMENT — PAIN DESCRIPTION - LOCATION
LOCATION: ABDOMEN;FLANK
LOCATION: ABDOMEN

## 2025-07-17 ASSESSMENT — PAIN - FUNCTIONAL ASSESSMENT: PAIN_FUNCTIONAL_ASSESSMENT: PREVENTS OR INTERFERES SOME ACTIVE ACTIVITIES AND ADLS

## 2025-07-17 NOTE — CONSULTS
Nephrology Consult Note  [facility]  [phone]   [facility]        Reason for Consult:  ESRD s/p renal transplant    HISTORY OF PRESENT ILLNESS:                This is a patient with significant past medical history of ESRD s/p renal transplant who presented with hematemesis.  She follows with my partner Dr. Orlando, s/p transplant for ESRD d/t DM2/HTN.  Transplant was 2019,  donor, at .  Cr has been 0.9-1.2.  On MMF 1000mg BID, tacro 1.5mg BID.  Goal tacro level 5-8.  EGD this admit 7/15 showed gastric polyps.  Prior hx of gastric sleeve.  Still having some abdominal discomfort.      Past Medical History:        Diagnosis Date    Anemia     Chronic kidney disease     Diabetes mellitus (HCC)     Hypertension     Obesity        Past Surgical History:        Procedure Laterality Date     SECTION  ,,    CHOLECYSTECTOMY      DIALYSIS FISTULA CREATION      ENDOSCOPY, COLON, DIAGNOSTIC  2018    Esophagogastroduodenoscopy with biopsy Colonoscopy with polypectomy (snare cautery)    HAND TENDON SURGERY      thumb-     TUBAL LIGATION      UMBILICAL HERNIA REPAIR      UPPER GASTROINTESTINAL ENDOSCOPY N/A 7/15/2025    ESOPHAGOGASTRODUODENOSCOPY performed by Juancarlos Reyes MD at [facility] ENDOSCOPY       Current Medications:    No current facility-administered medications on file prior to encounter.     Current Outpatient Medications on File Prior to Encounter   Medication Sig Dispense Refill    atorvastatin (LIPITOR) 40 MG tablet Take 1 tablet by mouth daily      baclofen (LIORESAL) 10 MG tablet Take 1 tablet by mouth nightly as needed      cyclobenzaprine (FLEXERIL) 5 MG tablet Take 1 tablet by mouth 3 times daily as needed      methocarbamol (ROBAXIN) 500 MG tablet Take 1.5 tablets by mouth 3 times daily      lisinopril (PRINIVIL;ZESTRIL) 20 MG tablet Take 1 tablet by mouth daily (Patient taking differently: Take 1 tablet by mouth nightly)

## 2025-07-17 NOTE — CARE COORDINATION
Discharge Planning:    Per chart review, patient admitted with hematemesis. Patient still with persistent RUQ discomfort. Medical team monitoring for pain and diet toleration.    At this time, patient plans to return home with family support; no discharge planning needs identified. PT/OT evals noted; no recommendations for ongoing therapy or DME at this time. CM to continue to follow for updates.    Electronically signed by CAROLINE RUDOLPH RN on 7/17/2025 at 10:35 AM

## 2025-07-17 NOTE — PLAN OF CARE
Problem: Chronic Conditions and Co-morbidities  Goal: Patient's chronic conditions and co-morbidity symptoms are monitored and maintained or improved  7/17/2025 0036 by Vianey Portillo, RN  Outcome: Progressing  Flowsheets (Taken 7/17/2025 0036)  Care Plan - Patient's Chronic Conditions and Co-Morbidity Symptoms are Monitored and Maintained or Improved:   Monitor and assess patient's chronic conditions and comorbid symptoms for stability, deterioration, or improvement   Collaborate with multidisciplinary team to address chronic and comorbid conditions and prevent exacerbation or deterioration     Problem: Discharge Planning  Goal: Discharge to home or other facility with appropriate resources  7/17/2025 0036 by Vianey Portillo, RN  Outcome: Progressing  Flowsheets (Taken 7/17/2025 0036)  Discharge to home or other facility with appropriate resources:   Identify barriers to discharge with patient and caregiver   Arrange for needed discharge resources and transportation as appropriate   Identify discharge learning needs (meds, wound care, etc)     Problem: Pain  Goal: Verbalizes/displays adequate comfort level or baseline comfort level  7/17/2025 0036 by Vianey Portillo, RN  Outcome: Progressing  Flowsheets (Taken 7/17/2025 0036)  Verbalizes/displays adequate comfort level or baseline comfort level:   Encourage patient to monitor pain and request assistance   Assess pain using appropriate pain scale   Administer analgesics based on type and severity of pain and evaluate response   Implement non-pharmacological measures as appropriate and evaluate response     Problem: Safety - Adult  Goal: Free from fall injury  7/17/2025 0036 by Vianey Portillo, RN  Outcome: Progressing  Flowsheets (Taken 7/17/2025 0036)  Free From Fall Injury: Instruct family/caregiver on patient safety     Problem: Neurosensory - Adult  Goal: Achieves stable or improved neurological status  Outcome: Progressing  Flowsheets (Taken

## 2025-07-18 LAB
ALBUMIN SERPL-MCNC: 3.7 G/DL (ref 3.4–5)
ALBUMIN/GLOB SERPL: 1.1 {RATIO} (ref 1.1–2.2)
ALP SERPL-CCNC: 81 U/L (ref 40–129)
ALT SERPL-CCNC: 36 U/L (ref 10–40)
ANION GAP SERPL CALCULATED.3IONS-SCNC: 9 MMOL/L (ref 3–16)
AST SERPL-CCNC: 25 U/L (ref 15–37)
BASOPHILS # BLD: 0 K/UL (ref 0–0.2)
BASOPHILS NFR BLD: 0.8 %
BILIRUB SERPL-MCNC: 0.6 MG/DL (ref 0–1)
BUN SERPL-MCNC: 8 MG/DL (ref 7–20)
CALCIUM SERPL-MCNC: 9.3 MG/DL (ref 8.3–10.6)
CHLORIDE SERPL-SCNC: 104 MMOL/L (ref 99–110)
CO2 SERPL-SCNC: 25 MMOL/L (ref 21–32)
CREAT SERPL-MCNC: 1 MG/DL (ref 0.6–1.1)
DEPRECATED RDW RBC AUTO: 16 % (ref 12.4–15.4)
EOSINOPHIL # BLD: 0.1 K/UL (ref 0–0.6)
EOSINOPHIL NFR BLD: 1.5 %
GFR SERPLBLD CREATININE-BSD FMLA CKD-EPI: 71 ML/MIN/{1.73_M2}
GLUCOSE BLD-MCNC: 142 MG/DL (ref 70–99)
GLUCOSE BLD-MCNC: 146 MG/DL (ref 70–99)
GLUCOSE BLD-MCNC: 166 MG/DL (ref 70–99)
GLUCOSE BLD-MCNC: 222 MG/DL (ref 70–99)
GLUCOSE SERPL-MCNC: 140 MG/DL (ref 70–99)
HCT VFR BLD AUTO: 33.5 % (ref 36–48)
HGB BLD-MCNC: 10.7 G/DL (ref 12–16)
LYMPHOCYTES # BLD: 1 K/UL (ref 1–5.1)
LYMPHOCYTES NFR BLD: 19.4 %
MCH RBC QN AUTO: 25 PG (ref 26–34)
MCHC RBC AUTO-ENTMCNC: 32.1 G/DL (ref 31–36)
MCV RBC AUTO: 78.1 FL (ref 80–100)
MONOCYTES # BLD: 0.6 K/UL (ref 0–1.3)
MONOCYTES NFR BLD: 11.3 %
NEUTROPHILS # BLD: 3.5 K/UL (ref 1.7–7.7)
NEUTROPHILS NFR BLD: 67 %
PERFORMED ON: ABNORMAL
PLATELET # BLD AUTO: 283 K/UL (ref 135–450)
PMV BLD AUTO: 7.8 FL (ref 5–10.5)
POTASSIUM SERPL-SCNC: 4.4 MMOL/L (ref 3.5–5.1)
PROT SERPL-MCNC: 7.1 G/DL (ref 6.4–8.2)
RBC # BLD AUTO: 4.29 M/UL (ref 4–5.2)
SODIUM SERPL-SCNC: 138 MMOL/L (ref 136–145)
WBC # BLD AUTO: 5.3 K/UL (ref 4–11)

## 2025-07-18 PROCEDURE — 6370000000 HC RX 637 (ALT 250 FOR IP): Performed by: INTERNAL MEDICINE

## 2025-07-18 PROCEDURE — 1200000000 HC SEMI PRIVATE

## 2025-07-18 PROCEDURE — 6370000000 HC RX 637 (ALT 250 FOR IP): Performed by: STUDENT IN AN ORGANIZED HEALTH CARE EDUCATION/TRAINING PROGRAM

## 2025-07-18 PROCEDURE — 2500000003 HC RX 250 WO HCPCS: Performed by: INTERNAL MEDICINE

## 2025-07-18 PROCEDURE — 36415 COLL VENOUS BLD VENIPUNCTURE: CPT

## 2025-07-18 PROCEDURE — 80053 COMPREHEN METABOLIC PANEL: CPT

## 2025-07-18 PROCEDURE — 94760 N-INVAS EAR/PLS OXIMETRY 1: CPT

## 2025-07-18 PROCEDURE — 6360000002 HC RX W HCPCS: Performed by: INTERNAL MEDICINE

## 2025-07-18 PROCEDURE — 85025 COMPLETE CBC W/AUTO DIFF WBC: CPT

## 2025-07-18 RX ORDER — POLYETHYLENE GLYCOL 3350 17 G/17G
17 POWDER, FOR SOLUTION ORAL 2 TIMES DAILY
Status: DISCONTINUED | OUTPATIENT
Start: 2025-07-18 | End: 2025-07-21 | Stop reason: HOSPADM

## 2025-07-18 RX ORDER — ENOXAPARIN SODIUM 100 MG/ML
30 INJECTION SUBCUTANEOUS 2 TIMES DAILY
Status: DISCONTINUED | OUTPATIENT
Start: 2025-07-18 | End: 2025-07-21 | Stop reason: HOSPADM

## 2025-07-18 RX ORDER — SENNA AND DOCUSATE SODIUM 50; 8.6 MG/1; MG/1
2 TABLET, FILM COATED ORAL DAILY
Status: DISCONTINUED | OUTPATIENT
Start: 2025-07-18 | End: 2025-07-21 | Stop reason: HOSPADM

## 2025-07-18 RX ORDER — DOCUSATE SODIUM 100 MG/1
100 CAPSULE, LIQUID FILLED ORAL 2 TIMES DAILY
Status: DISCONTINUED | OUTPATIENT
Start: 2025-07-18 | End: 2025-07-21 | Stop reason: HOSPADM

## 2025-07-18 RX ADMIN — DOCUSATE SODIUM AND SENNOSIDES 2 TABLET: 8.6; 5 TABLET, FILM COATED ORAL at 11:41

## 2025-07-18 RX ADMIN — CITALOPRAM HYDROBROMIDE 20 MG: 20 TABLET ORAL at 20:55

## 2025-07-18 RX ADMIN — PANTOPRAZOLE SODIUM 40 MG: 40 TABLET, DELAYED RELEASE ORAL at 09:00

## 2025-07-18 RX ADMIN — TACROLIMUS 0.5 MG: 0.5 CAPSULE ORAL at 09:02

## 2025-07-18 RX ADMIN — PANTOPRAZOLE SODIUM 40 MG: 40 TABLET, DELAYED RELEASE ORAL at 20:46

## 2025-07-18 RX ADMIN — OXYCODONE 5 MG: 5 TABLET ORAL at 09:00

## 2025-07-18 RX ADMIN — PREGABALIN 25 MG: 25 CAPSULE ORAL at 09:00

## 2025-07-18 RX ADMIN — MYCOPHENOLATE MOFETIL 1000 MG: 250 CAPSULE ORAL at 09:03

## 2025-07-18 RX ADMIN — CYCLOBENZAPRINE 5 MG: 10 TABLET, FILM COATED ORAL at 18:17

## 2025-07-18 RX ADMIN — ATORVASTATIN CALCIUM 40 MG: 40 TABLET, FILM COATED ORAL at 09:00

## 2025-07-18 RX ADMIN — PREGABALIN 25 MG: 25 CAPSULE ORAL at 20:46

## 2025-07-18 RX ADMIN — OXYCODONE HYDROCHLORIDE 10 MG: 10 TABLET ORAL at 20:46

## 2025-07-18 RX ADMIN — MYCOPHENOLATE MOFETIL 1000 MG: 250 CAPSULE ORAL at 20:44

## 2025-07-18 RX ADMIN — ENTECAVIR 0.5 MG: 0.5 TABLET ORAL at 09:02

## 2025-07-18 RX ADMIN — DIPHENHYDRAMINE HYDROCHLORIDE 25 MG: 50 INJECTION INTRAMUSCULAR; INTRAVENOUS at 09:00

## 2025-07-18 RX ADMIN — POLYETHYLENE GLYCOL 3350 17 G: 17 POWDER, FOR SOLUTION ORAL at 08:59

## 2025-07-18 RX ADMIN — TACROLIMUS 2 MG: 1 CAPSULE ORAL at 20:44

## 2025-07-18 RX ADMIN — SODIUM CHLORIDE, PRESERVATIVE FREE 10 ML: 5 INJECTION INTRAVENOUS at 09:06

## 2025-07-18 RX ADMIN — SODIUM CHLORIDE, PRESERVATIVE FREE 10 ML: 5 INJECTION INTRAVENOUS at 20:47

## 2025-07-18 RX ADMIN — LISINOPRIL 20 MG: 20 TABLET ORAL at 09:00

## 2025-07-18 RX ADMIN — AMLODIPINE BESYLATE 10 MG: 10 TABLET ORAL at 09:00

## 2025-07-18 RX ADMIN — TACROLIMUS 1 MG: 1 CAPSULE ORAL at 09:02

## 2025-07-18 RX ADMIN — DOCUSATE SODIUM 100 MG: 100 CAPSULE, LIQUID FILLED ORAL at 20:46

## 2025-07-18 RX ADMIN — POLYETHYLENE GLYCOL 3350 17 G: 17 POWDER, FOR SOLUTION ORAL at 20:54

## 2025-07-18 RX ADMIN — BACLOFEN 10 MG: 10 TABLET ORAL at 23:13

## 2025-07-18 RX ADMIN — INSULIN LISPRO 2 UNITS: 100 INJECTION, SOLUTION INTRAVENOUS; SUBCUTANEOUS at 12:29

## 2025-07-18 ASSESSMENT — PAIN DESCRIPTION - ORIENTATION: ORIENTATION: LEFT

## 2025-07-18 ASSESSMENT — PAIN SCALES - GENERAL
PAINLEVEL_OUTOF10: 5
PAINLEVEL_OUTOF10: 8
PAINLEVEL_OUTOF10: 6

## 2025-07-18 ASSESSMENT — PAIN DESCRIPTION - DESCRIPTORS: DESCRIPTORS: ACHING

## 2025-07-18 ASSESSMENT — PAIN DESCRIPTION - LOCATION
LOCATION: ABDOMEN
LOCATION: ABDOMEN

## 2025-07-18 ASSESSMENT — PAIN SCALES - WONG BAKER: WONGBAKER_NUMERICALRESPONSE: NO HURT

## 2025-07-18 NOTE — PLAN OF CARE
Problem: Chronic Conditions and Co-morbidities  Goal: Patient's chronic conditions and co-morbidity symptoms are monitored and maintained or improved  Outcome: Progressing     Problem: Discharge Planning  Goal: Discharge to home or other facility with appropriate resources  Outcome: Progressing  Flowsheets (Taken 7/17/2025 2052)  Discharge to home or other facility with appropriate resources:   Identify barriers to discharge with patient and caregiver   Arrange for needed discharge resources and transportation as appropriate   Identify discharge learning needs (meds, wound care, etc)     Problem: Pain  Goal: Verbalizes/displays adequate comfort level or baseline comfort level  Outcome: Progressing     Problem: Safety - Adult  Goal: Free from fall injury  Outcome: Progressing     Problem: Neurosensory - Adult  Goal: Achieves stable or improved neurological status  Outcome: Progressing  Goal: Absence of seizures  Outcome: Progressing  Goal: Achieves maximal functionality and self care  Outcome: Progressing

## 2025-07-18 NOTE — PLAN OF CARE
Problem: Chronic Conditions and Co-morbidities  Goal: Patient's chronic conditions and co-morbidity symptoms are monitored and maintained or improved  7/18/2025 1035 by Lauren Portillo RN  Outcome: Adequate for Discharge     Problem: Discharge Planning  Goal: Discharge to home or other facility with appropriate resources  7/18/2025 1035 by Lauren Portillo RN  Outcome: Adequate for Discharge     Problem: Pain  Goal: Verbalizes/displays adequate comfort level or baseline comfort level  7/18/2025 1035 by Lauren Portillo RN  Outcome: Adequate for Discharge     Problem: Safety - Adult  Goal: Free from fall injury  7/18/2025 1035 by Laruen Portillo RN  Outcome: Adequate for Discharge     Problem: Neurosensory - Adult  Goal: Achieves stable or improved neurological status  7/18/2025 1035 by Lauren Portillo RN  Outcome: Adequate for Discharge     Problem: Neurosensory - Adult  Goal: Absence of seizures  7/18/2025 1035 by Lauren Portillo RN  Outcome: Adequate for Discharge     Problem: Neurosensory - Adult  Goal: Achieves maximal functionality and self care  7/18/2025 1035 by Lauren Portillo RN  Outcome: Adequate for Discharge

## 2025-07-19 LAB
GLUCOSE BLD-MCNC: 149 MG/DL (ref 70–99)
GLUCOSE BLD-MCNC: 182 MG/DL (ref 70–99)
GLUCOSE BLD-MCNC: 207 MG/DL (ref 70–99)
GLUCOSE BLD-MCNC: 280 MG/DL (ref 70–99)
PERFORMED ON: ABNORMAL

## 2025-07-19 PROCEDURE — 6370000000 HC RX 637 (ALT 250 FOR IP): Performed by: INTERNAL MEDICINE

## 2025-07-19 PROCEDURE — 6370000000 HC RX 637 (ALT 250 FOR IP): Performed by: STUDENT IN AN ORGANIZED HEALTH CARE EDUCATION/TRAINING PROGRAM

## 2025-07-19 PROCEDURE — 1200000000 HC SEMI PRIVATE

## 2025-07-19 PROCEDURE — 6360000002 HC RX W HCPCS: Performed by: STUDENT IN AN ORGANIZED HEALTH CARE EDUCATION/TRAINING PROGRAM

## 2025-07-19 PROCEDURE — 6360000002 HC RX W HCPCS: Performed by: INTERNAL MEDICINE

## 2025-07-19 PROCEDURE — 2500000003 HC RX 250 WO HCPCS: Performed by: INTERNAL MEDICINE

## 2025-07-19 RX ORDER — BISACODYL 10 MG
10 SUPPOSITORY, RECTAL RECTAL DAILY PRN
Status: DISCONTINUED | OUTPATIENT
Start: 2025-07-19 | End: 2025-07-21 | Stop reason: HOSPADM

## 2025-07-19 RX ADMIN — AMLODIPINE BESYLATE 10 MG: 10 TABLET ORAL at 09:55

## 2025-07-19 RX ADMIN — DOCUSATE SODIUM 100 MG: 100 CAPSULE, LIQUID FILLED ORAL at 21:11

## 2025-07-19 RX ADMIN — PREGABALIN 25 MG: 25 CAPSULE ORAL at 09:55

## 2025-07-19 RX ADMIN — TACROLIMUS 0.5 MG: 0.5 CAPSULE ORAL at 09:55

## 2025-07-19 RX ADMIN — PREGABALIN 25 MG: 25 CAPSULE ORAL at 12:29

## 2025-07-19 RX ADMIN — ENTECAVIR 0.5 MG: 0.5 TABLET ORAL at 09:55

## 2025-07-19 RX ADMIN — MYCOPHENOLATE MOFETIL 1000 MG: 250 CAPSULE ORAL at 21:11

## 2025-07-19 RX ADMIN — INSULIN LISPRO 2 UNITS: 100 INJECTION, SOLUTION INTRAVENOUS; SUBCUTANEOUS at 12:28

## 2025-07-19 RX ADMIN — ATORVASTATIN CALCIUM 40 MG: 40 TABLET, FILM COATED ORAL at 09:55

## 2025-07-19 RX ADMIN — CITALOPRAM HYDROBROMIDE 20 MG: 20 TABLET ORAL at 21:11

## 2025-07-19 RX ADMIN — LISINOPRIL 20 MG: 20 TABLET ORAL at 09:55

## 2025-07-19 RX ADMIN — TACROLIMUS 2 MG: 1 CAPSULE ORAL at 21:11

## 2025-07-19 RX ADMIN — DOCUSATE SODIUM AND SENNOSIDES 2 TABLET: 8.6; 5 TABLET, FILM COATED ORAL at 09:40

## 2025-07-19 RX ADMIN — PREGABALIN 25 MG: 25 CAPSULE ORAL at 21:11

## 2025-07-19 RX ADMIN — PANTOPRAZOLE SODIUM 40 MG: 40 TABLET, DELAYED RELEASE ORAL at 21:11

## 2025-07-19 RX ADMIN — SODIUM CHLORIDE, PRESERVATIVE FREE 10 ML: 5 INJECTION INTRAVENOUS at 21:10

## 2025-07-19 RX ADMIN — INSULIN LISPRO 4 UNITS: 100 INJECTION, SOLUTION INTRAVENOUS; SUBCUTANEOUS at 21:11

## 2025-07-19 RX ADMIN — TACROLIMUS 1 MG: 1 CAPSULE ORAL at 09:55

## 2025-07-19 RX ADMIN — SODIUM CHLORIDE, PRESERVATIVE FREE 10 ML: 5 INJECTION INTRAVENOUS at 09:58

## 2025-07-19 RX ADMIN — POLYETHYLENE GLYCOL 3350 17 G: 17 POWDER, FOR SOLUTION ORAL at 21:41

## 2025-07-19 RX ADMIN — MYCOPHENOLATE MOFETIL 1000 MG: 250 CAPSULE ORAL at 09:56

## 2025-07-19 RX ADMIN — INSULIN LISPRO 2 UNITS: 100 INJECTION, SOLUTION INTRAVENOUS; SUBCUTANEOUS at 17:59

## 2025-07-19 RX ADMIN — MAJOR MAGNESIUM CITRATE ORAL SOLUTION - LEMON 296 ML: 1.75 LIQUID ORAL at 09:51

## 2025-07-19 RX ADMIN — POLYETHYLENE GLYCOL 3350 17 G: 17 POWDER, FOR SOLUTION ORAL at 09:40

## 2025-07-19 RX ADMIN — DOCUSATE SODIUM 100 MG: 100 CAPSULE, LIQUID FILLED ORAL at 09:40

## 2025-07-19 RX ADMIN — PANTOPRAZOLE SODIUM 40 MG: 40 TABLET, DELAYED RELEASE ORAL at 09:55

## 2025-07-19 ASSESSMENT — PAIN SCALES - GENERAL: PAINLEVEL_OUTOF10: 0

## 2025-07-19 NOTE — PLAN OF CARE
Problem: Chronic Conditions and Co-morbidities  Goal: Patient's chronic conditions and co-morbidity symptoms are monitored and maintained or improved  7/18/2025 2316 by Gladys Redmond RN  Outcome: Progressing  7/18/2025 1035 by Lauren Portillo RN  Outcome: Adequate for Discharge     Problem: Discharge Planning  Goal: Discharge to home or other facility with appropriate resources  7/18/2025 2316 by Gladys Redmond RN  Outcome: Progressing  7/18/2025 1035 by Lauren Portillo RN  Outcome: Adequate for Discharge     Problem: Pain  Goal: Verbalizes/displays adequate comfort level or baseline comfort level  7/18/2025 2316 by Gladys Redmond RN  Outcome: Progressing  7/18/2025 1035 by Lauren Portillo RN  Outcome: Adequate for Discharge     Problem: Safety - Adult  Goal: Free from fall injury  7/18/2025 2316 by Gladys Redmond RN  Outcome: Progressing  7/18/2025 1035 by Lauren Portillo RN  Outcome: Adequate for Discharge     Problem: Neurosensory - Adult  Goal: Achieves stable or improved neurological status  7/18/2025 2316 by Gladys Redmond RN  Outcome: Progressing  7/18/2025 1035 by Lauren Poritllo RN  Outcome: Adequate for Discharge  Goal: Absence of seizures  7/18/2025 2316 by Gladys Redmond RN  Outcome: Progressing  7/18/2025 1035 by Lauren Portillo RN  Outcome: Adequate for Discharge  Goal: Achieves maximal functionality and self care  7/18/2025 2316 by Gladys Redmond RN  Outcome: Progressing  7/18/2025 1035 by Lauren Portillo RN  Outcome: Adequate for Discharge

## 2025-07-19 NOTE — PLAN OF CARE
Problem: Chronic Conditions and Co-morbidities  Goal: Patient's chronic conditions and co-morbidity symptoms are monitored and maintained or improved  7/19/2025 1022 by Debbie Palomares RN  Outcome: Progressing     Problem: ABCDS Injury Assessment  Goal: Absence of physical injury  Outcome: Progressing

## 2025-07-20 LAB
ANION GAP SERPL CALCULATED.3IONS-SCNC: 12 MMOL/L (ref 3–16)
BASOPHILS # BLD: 0.1 K/UL (ref 0–0.2)
BASOPHILS NFR BLD: 0.8 %
BUN SERPL-MCNC: 9 MG/DL (ref 7–20)
CALCIUM SERPL-MCNC: 9.3 MG/DL (ref 8.3–10.6)
CHLORIDE SERPL-SCNC: 105 MMOL/L (ref 99–110)
CO2 SERPL-SCNC: 23 MMOL/L (ref 21–32)
CREAT SERPL-MCNC: 1 MG/DL (ref 0.6–1.1)
DEPRECATED RDW RBC AUTO: 16.3 % (ref 12.4–15.4)
EOSINOPHIL # BLD: 0.1 K/UL (ref 0–0.6)
EOSINOPHIL NFR BLD: 1 %
EST. AVERAGE GLUCOSE BLD GHB EST-MCNC: 151.3 MG/DL
GFR SERPLBLD CREATININE-BSD FMLA CKD-EPI: 71 ML/MIN/{1.73_M2}
GLUCOSE BLD-MCNC: 138 MG/DL (ref 70–99)
GLUCOSE BLD-MCNC: 144 MG/DL (ref 70–99)
GLUCOSE BLD-MCNC: 147 MG/DL (ref 70–99)
GLUCOSE BLD-MCNC: 189 MG/DL (ref 70–99)
GLUCOSE SERPL-MCNC: 101 MG/DL (ref 70–99)
HBA1C MFR BLD: 6.9 %
HCT VFR BLD AUTO: 34.7 % (ref 36–48)
HGB BLD-MCNC: 11.1 G/DL (ref 12–16)
LYMPHOCYTES # BLD: 1 K/UL (ref 1–5.1)
LYMPHOCYTES NFR BLD: 15.9 %
MCH RBC QN AUTO: 25 PG (ref 26–34)
MCHC RBC AUTO-ENTMCNC: 31.9 G/DL (ref 31–36)
MCV RBC AUTO: 78.5 FL (ref 80–100)
MONOCYTES # BLD: 0.5 K/UL (ref 0–1.3)
MONOCYTES NFR BLD: 7.6 %
NEUTROPHILS # BLD: 4.9 K/UL (ref 1.7–7.7)
NEUTROPHILS NFR BLD: 74.7 %
PERFORMED ON: ABNORMAL
PLATELET # BLD AUTO: 314 K/UL (ref 135–450)
PMV BLD AUTO: 7.7 FL (ref 5–10.5)
POTASSIUM SERPL-SCNC: 4 MMOL/L (ref 3.5–5.1)
RBC # BLD AUTO: 4.42 M/UL (ref 4–5.2)
SODIUM SERPL-SCNC: 140 MMOL/L (ref 136–145)
WBC # BLD AUTO: 6.5 K/UL (ref 4–11)

## 2025-07-20 PROCEDURE — 6370000000 HC RX 637 (ALT 250 FOR IP): Performed by: STUDENT IN AN ORGANIZED HEALTH CARE EDUCATION/TRAINING PROGRAM

## 2025-07-20 PROCEDURE — 2500000003 HC RX 250 WO HCPCS: Performed by: INTERNAL MEDICINE

## 2025-07-20 PROCEDURE — 6370000000 HC RX 637 (ALT 250 FOR IP): Performed by: INTERNAL MEDICINE

## 2025-07-20 PROCEDURE — 36415 COLL VENOUS BLD VENIPUNCTURE: CPT

## 2025-07-20 PROCEDURE — 6360000002 HC RX W HCPCS: Performed by: INTERNAL MEDICINE

## 2025-07-20 PROCEDURE — 85025 COMPLETE CBC W/AUTO DIFF WBC: CPT

## 2025-07-20 PROCEDURE — 80048 BASIC METABOLIC PNL TOTAL CA: CPT

## 2025-07-20 PROCEDURE — 1200000000 HC SEMI PRIVATE

## 2025-07-20 PROCEDURE — 83036 HEMOGLOBIN GLYCOSYLATED A1C: CPT

## 2025-07-20 PROCEDURE — 94760 N-INVAS EAR/PLS OXIMETRY 1: CPT

## 2025-07-20 RX ORDER — ACYCLOVIR 400 MG/1
TABLET ORAL
COMMUNITY

## 2025-07-20 RX ORDER — INSULIN GLARGINE 100 [IU]/ML
5 INJECTION, SOLUTION SUBCUTANEOUS 2 TIMES DAILY
Status: DISCONTINUED | OUTPATIENT
Start: 2025-07-20 | End: 2025-07-20

## 2025-07-20 RX ORDER — INSULIN LISPRO 100 [IU]/ML
3 INJECTION, SOLUTION INTRAVENOUS; SUBCUTANEOUS
Status: DISCONTINUED | OUTPATIENT
Start: 2025-07-20 | End: 2025-07-21 | Stop reason: HOSPADM

## 2025-07-20 RX ORDER — INSULIN GLARGINE 100 [IU]/ML
5 INJECTION, SOLUTION SUBCUTANEOUS 2 TIMES DAILY
Status: DISCONTINUED | OUTPATIENT
Start: 2025-07-20 | End: 2025-07-21 | Stop reason: HOSPADM

## 2025-07-20 RX ORDER — INSULIN LISPRO 100 [IU]/ML
0-4 INJECTION, SOLUTION INTRAVENOUS; SUBCUTANEOUS
Status: DISCONTINUED | OUTPATIENT
Start: 2025-07-20 | End: 2025-07-21 | Stop reason: HOSPADM

## 2025-07-20 RX ADMIN — PANTOPRAZOLE SODIUM 40 MG: 40 TABLET, DELAYED RELEASE ORAL at 08:52

## 2025-07-20 RX ADMIN — INSULIN LISPRO 3 UNITS: 100 INJECTION, SOLUTION INTRAVENOUS; SUBCUTANEOUS at 13:05

## 2025-07-20 RX ADMIN — ACETAMINOPHEN 650 MG: 325 TABLET ORAL at 13:05

## 2025-07-20 RX ADMIN — MYCOPHENOLATE MOFETIL 1000 MG: 250 CAPSULE ORAL at 08:51

## 2025-07-20 RX ADMIN — TACROLIMUS 2 MG: 1 CAPSULE ORAL at 22:01

## 2025-07-20 RX ADMIN — AMLODIPINE BESYLATE 10 MG: 10 TABLET ORAL at 08:51

## 2025-07-20 RX ADMIN — PREGABALIN 25 MG: 25 CAPSULE ORAL at 16:38

## 2025-07-20 RX ADMIN — CITALOPRAM HYDROBROMIDE 20 MG: 20 TABLET ORAL at 22:01

## 2025-07-20 RX ADMIN — BISACODYL 10 MG: 10 SUPPOSITORY RECTAL at 11:05

## 2025-07-20 RX ADMIN — DOCUSATE SODIUM 100 MG: 100 CAPSULE, LIQUID FILLED ORAL at 08:46

## 2025-07-20 RX ADMIN — INSULIN GLARGINE 5 UNITS: 100 INJECTION, SOLUTION SUBCUTANEOUS at 22:02

## 2025-07-20 RX ADMIN — ATORVASTATIN CALCIUM 40 MG: 40 TABLET, FILM COATED ORAL at 08:51

## 2025-07-20 RX ADMIN — INSULIN LISPRO 1 UNITS: 100 INJECTION, SOLUTION INTRAVENOUS; SUBCUTANEOUS at 18:03

## 2025-07-20 RX ADMIN — INSULIN GLARGINE 5 UNITS: 100 INJECTION, SOLUTION SUBCUTANEOUS at 11:34

## 2025-07-20 RX ADMIN — SODIUM CHLORIDE, PRESERVATIVE FREE 10 ML: 5 INJECTION INTRAVENOUS at 08:58

## 2025-07-20 RX ADMIN — INSULIN LISPRO 3 UNITS: 100 INJECTION, SOLUTION INTRAVENOUS; SUBCUTANEOUS at 18:03

## 2025-07-20 RX ADMIN — TACROLIMUS 1 MG: 1 CAPSULE ORAL at 08:51

## 2025-07-20 RX ADMIN — TACROLIMUS 0.5 MG: 0.5 CAPSULE ORAL at 08:51

## 2025-07-20 RX ADMIN — POLYETHYLENE GLYCOL 3350 17 G: 17 POWDER, FOR SOLUTION ORAL at 22:01

## 2025-07-20 RX ADMIN — SODIUM CHLORIDE, PRESERVATIVE FREE 10 ML: 5 INJECTION INTRAVENOUS at 22:02

## 2025-07-20 RX ADMIN — POLYETHYLENE GLYCOL 3350 17 G: 17 POWDER, FOR SOLUTION ORAL at 08:44

## 2025-07-20 RX ADMIN — PREGABALIN 25 MG: 25 CAPSULE ORAL at 08:52

## 2025-07-20 RX ADMIN — LISINOPRIL 20 MG: 20 TABLET ORAL at 08:51

## 2025-07-20 RX ADMIN — PREGABALIN 25 MG: 25 CAPSULE ORAL at 22:01

## 2025-07-20 RX ADMIN — PANTOPRAZOLE SODIUM 40 MG: 40 TABLET, DELAYED RELEASE ORAL at 22:01

## 2025-07-20 RX ADMIN — ENTECAVIR 0.5 MG: 0.5 TABLET ORAL at 08:51

## 2025-07-20 RX ADMIN — DOCUSATE SODIUM AND SENNOSIDES 2 TABLET: 8.6; 5 TABLET, FILM COATED ORAL at 08:46

## 2025-07-20 RX ADMIN — DOCUSATE SODIUM 100 MG: 100 CAPSULE, LIQUID FILLED ORAL at 22:01

## 2025-07-20 RX ADMIN — MYCOPHENOLATE MOFETIL 1000 MG: 250 CAPSULE ORAL at 22:01

## 2025-07-20 ASSESSMENT — PAIN SCALES - GENERAL: PAINLEVEL_OUTOF10: 3

## 2025-07-20 ASSESSMENT — PAIN DESCRIPTION - ORIENTATION: ORIENTATION: MID

## 2025-07-20 ASSESSMENT — PAIN - FUNCTIONAL ASSESSMENT: PAIN_FUNCTIONAL_ASSESSMENT: ACTIVITIES ARE NOT PREVENTED

## 2025-07-20 ASSESSMENT — PAIN DESCRIPTION - LOCATION: LOCATION: HEAD

## 2025-07-20 NOTE — PLAN OF CARE
Problem: Chronic Conditions and Co-morbidities  Goal: Patient's chronic conditions and co-morbidity symptoms are monitored and maintained or improved  7/20/2025 1054 by Debbie Palomares RN  Outcome: Progressing     Problem: Safety - Adult  Goal: Free from fall injury  7/20/2025 1054 by Debbie Palomares, RN  Outcome: Progressing     Problem: Pain  Goal: Verbalizes/displays adequate comfort level or baseline comfort level  7/20/2025 1054 by Debbie Palomares, RN  Outcome: Progressing

## 2025-07-20 NOTE — DISCHARGE INSTRUCTIONS
[employer]:    About Diabetes: https://diabetes.org/about-diabetes    Life with Diabetes: https://diabetes.org/living-with-diabetes    Health & Wellness: https://diabetes.org/health-wellness    Food & Nutrition: https://diabetes.org/food-nutrition    Tools & Resources: https://diabetes.org/tools-resources  _________________________________________________    [employer] Customer Service: [phone]    Call if your sensor falls off, or if you have the following tests (MRI or CT Scan). [employer] will validate your name, date of birth, address, and validate you have an active order. [employer] allows 3 replacements per year. It takes 3-5 business days to receive replacement sensor   _________________________________________________

## 2025-07-20 NOTE — PLAN OF CARE
Problem: Chronic Conditions and Co-morbidities  Goal: Patient's chronic conditions and co-morbidity symptoms are monitored and maintained or improved  7/19/2025 2149 by Gladys Redmond RN  Outcome: Progressing  7/19/2025 1022 by Debbie Palomares RN  Outcome: Progressing     Problem: Discharge Planning  Goal: Discharge to home or other facility with appropriate resources  7/19/2025 2149 by Gladys Redmond RN  Outcome: Progressing  7/19/2025 1022 by Debbie Palomares, RN  Outcome: Progressing     Problem: Pain  Goal: Verbalizes/displays adequate comfort level or baseline comfort level  7/19/2025 2149 by Gladys Redmond RN  Outcome: Progressing  7/19/2025 1022 by Debbie Palomares, RN  Outcome: Progressing

## 2025-07-21 VITALS
TEMPERATURE: 98.4 F | DIASTOLIC BLOOD PRESSURE: 85 MMHG | BODY MASS INDEX: 43.6 KG/M2 | HEART RATE: 78 BPM | HEIGHT: 68 IN | WEIGHT: 287.7 LBS | OXYGEN SATURATION: 98 % | SYSTOLIC BLOOD PRESSURE: 121 MMHG | RESPIRATION RATE: 16 BRPM

## 2025-07-21 LAB
ALBUMIN SERPL-MCNC: 3.8 G/DL (ref 3.4–5)
ANION GAP SERPL CALCULATED.3IONS-SCNC: 12 MMOL/L (ref 3–16)
BUN SERPL-MCNC: 8 MG/DL (ref 7–20)
CALCIUM SERPL-MCNC: 9.5 MG/DL (ref 8.3–10.6)
CHLORIDE SERPL-SCNC: 103 MMOL/L (ref 99–110)
CO2 SERPL-SCNC: 21 MMOL/L (ref 21–32)
CREAT SERPL-MCNC: 1 MG/DL (ref 0.6–1.1)
GFR SERPLBLD CREATININE-BSD FMLA CKD-EPI: 71 ML/MIN/{1.73_M2}
GLUCOSE BLD-MCNC: 156 MG/DL (ref 70–99)
GLUCOSE SERPL-MCNC: 236 MG/DL (ref 70–99)
PERFORMED ON: ABNORMAL
PHOSPHATE SERPL-MCNC: 2.8 MG/DL (ref 2.5–4.9)
POTASSIUM SERPL-SCNC: 4.4 MMOL/L (ref 3.5–5.1)
SODIUM SERPL-SCNC: 136 MMOL/L (ref 136–145)

## 2025-07-21 PROCEDURE — 6370000000 HC RX 637 (ALT 250 FOR IP): Performed by: INTERNAL MEDICINE

## 2025-07-21 PROCEDURE — 80069 RENAL FUNCTION PANEL: CPT

## 2025-07-21 PROCEDURE — 36415 COLL VENOUS BLD VENIPUNCTURE: CPT

## 2025-07-21 PROCEDURE — 94760 N-INVAS EAR/PLS OXIMETRY 1: CPT

## 2025-07-21 PROCEDURE — 6360000002 HC RX W HCPCS: Performed by: INTERNAL MEDICINE

## 2025-07-21 PROCEDURE — 6370000000 HC RX 637 (ALT 250 FOR IP): Performed by: STUDENT IN AN ORGANIZED HEALTH CARE EDUCATION/TRAINING PROGRAM

## 2025-07-21 RX ORDER — SENNA AND DOCUSATE SODIUM 50; 8.6 MG/1; MG/1
2 TABLET, FILM COATED ORAL DAILY
Qty: 20 TABLET | Refills: 0 | Status: SHIPPED | OUTPATIENT
Start: 2025-07-22

## 2025-07-21 RX ORDER — POLYETHYLENE GLYCOL 3350 17 G/17G
17 POWDER, FOR SOLUTION ORAL 2 TIMES DAILY
Qty: 10 EACH | Refills: 0 | Status: SHIPPED | OUTPATIENT
Start: 2025-07-21

## 2025-07-21 RX ORDER — PANTOPRAZOLE SODIUM 40 MG/1
40 TABLET, DELAYED RELEASE ORAL 2 TIMES DAILY
Qty: 60 TABLET | Refills: 0 | Status: SHIPPED | OUTPATIENT
Start: 2025-07-21

## 2025-07-21 RX ADMIN — PREGABALIN 25 MG: 25 CAPSULE ORAL at 08:49

## 2025-07-21 RX ADMIN — TACROLIMUS 0.5 MG: 0.5 CAPSULE ORAL at 08:48

## 2025-07-21 RX ADMIN — DOCUSATE SODIUM 100 MG: 100 CAPSULE, LIQUID FILLED ORAL at 08:48

## 2025-07-21 RX ADMIN — INSULIN LISPRO 3 UNITS: 100 INJECTION, SOLUTION INTRAVENOUS; SUBCUTANEOUS at 08:55

## 2025-07-21 RX ADMIN — PANTOPRAZOLE SODIUM 40 MG: 40 TABLET, DELAYED RELEASE ORAL at 08:49

## 2025-07-21 RX ADMIN — POLYETHYLENE GLYCOL 3350 17 G: 17 POWDER, FOR SOLUTION ORAL at 08:47

## 2025-07-21 RX ADMIN — DOCUSATE SODIUM AND SENNOSIDES 2 TABLET: 8.6; 5 TABLET, FILM COATED ORAL at 08:48

## 2025-07-21 RX ADMIN — INSULIN GLARGINE 5 UNITS: 100 INJECTION, SOLUTION SUBCUTANEOUS at 08:49

## 2025-07-21 RX ADMIN — AMLODIPINE BESYLATE 10 MG: 10 TABLET ORAL at 08:49

## 2025-07-21 RX ADMIN — MYCOPHENOLATE MOFETIL 1000 MG: 250 CAPSULE ORAL at 08:48

## 2025-07-21 RX ADMIN — ENTECAVIR 0.5 MG: 0.5 TABLET ORAL at 08:48

## 2025-07-21 RX ADMIN — LISINOPRIL 20 MG: 20 TABLET ORAL at 08:49

## 2025-07-21 RX ADMIN — ATORVASTATIN CALCIUM 40 MG: 40 TABLET, FILM COATED ORAL at 08:49

## 2025-07-21 RX ADMIN — TACROLIMUS 1 MG: 1 CAPSULE ORAL at 08:48

## 2025-07-21 NOTE — PROGRESS NOTES
V2.0    [facility] [place]      Name:  Jasmina Hahn /Age/Sex: 1980  (45 y.o. female)   MRN & CSN:  [id number] & [id number] Encounter Date/Time: 2025 8:50 AM EDT   Location:  [id number] PCP: Cassie Harman MD     Attending:Luis Mccabe DO       Hospital Day: 5  Brief HPI  Jasmina Hahn is a 45 y.o. female with pertinent PMHx of T2DM, HTN, ESRD s/p renal transplant, MGUS, INOCENTE who presented complaining of hematemesis and abdominal pain.  Assessment & Plan     Hematemesis, resolved  - S/p EGD 7/15 without evidence of bleeding  - Hemoglobin remains stable  - Continue Protonix 40 mg twice daily  - Gastroenterology following    Intractable abdominal pain  Constipation  - CT abdomen pelvis on admission nonacute  - Right upper quadrant ultrasound  showing fatty liver otherwise nonacute  - Repeat CT abdomen/pelvis  nonacute, showing stable ventral hernia  - Start bowel regimen with MiraLAX twice daily and Senokot    Type 2 diabetes  - Holding Lantus and prandial insulin currently as oral intake has been minimal  - Continue low-dose sliding scale correctional insulin 4 times daily meals and nightly  - Check point-of-care glucose 4 times daily with meals and nightly to monitor for hypoglycemia from insulin toxicity    Essential hypertension  - Continue lisinopril and amlodipine    History of renal transplant  - Continue tacrolimus and CellCept  - Nephrology following    Diet ADULT DIET; Regular; 5 carb choices (75 gm/meal)   DVT Prophylaxis [x] Lovenox, []  Heparin, [] SCDs, [] Ambulation,  [] Eliquis, [] Xarelto  [] Coumadin   Code Status Full Code   Disposition From: Home  Expected Disposition: Home  Estimated Date of Discharge: 1 to 2 days           Subjective:     Chief Complaint: Hematemesis    Patient was seen and examined today lying in bed  Still continues to complain of abdominal pain though states that she was able to eat some today  Says she has not had a bowel 
    V2.0    [facility] [place]      Name:  Jasmina Hahn /Age/Sex: 1980  (45 y.o. female)   MRN & CSN:  [id number] & [id number] Encounter Date/Time: 2025 8:50 AM EDT   Location:  [id number] PCP: Cassie Harman MD     Attending:Luis Mccabe DO       Hospital Day: 4  Brief HPI  Jasmina Hahn is a 45 y.o. female with pertinent PMHx of T2DM, HTN, ESRD s/p renal transplant, MGUS, INOCENTE who presented complaining of hematemesis and abdominal pain.  Assessment & Plan     Hematemesis, resolved  - S/p EGD 7/15 without evidence of bleeding  - Hemoglobin stable, check CBC daily  - Continue Protonix 40 mg twice daily  - Gastroenterology following    Intractable abdominal pain  - CT abdomen pelvis on admission nonacute however, she reports pain is worsened since  - Right upper quadrant ultrasound today showing fatty liver otherwise nonacute  - Given her persistent complaint of worsening abdominal pain will check repeat abdomen pelvis    Type 2 diabetes  - Holding Lantus and Premeal insulin currently as oral intake has been minimal  - Continue low-dose sliding scale correctional insulin 4 times daily meals and nightly  - Check point-of-care glucose 4 times daily with meals and nightly to monitor for hypoglycemia from insulin toxicity    Essential hypertension  - Continue lisinopril and amlodipine    History of renal transplant  - Continue tacrolimus and CellCept  - Nephrology consulted, follows with [facility]    Diet ADULT DIET; Regular; 5 carb choices (75 gm/meal)   DVT Prophylaxis [] Lovenox, []  Heparin, [x] SCDs, [] Ambulation,  [] Eliquis, [] Xarelto  [] Coumadin   Code Status Full Code   Disposition From: Home  Expected Disposition: Home  Estimated Date of Discharge: 1 to 2 days           Subjective:     Chief Complaint: Hematemesis    Patient was seen and examined today walking from bathroom  Continues to complain of worsening abdominal pain mostly epigastric to right upper quadrant  Says this is 
    V2.0    [facility] [place] Note      Name:  Jasmina Hahn /Age/Sex: 1980  (45 y.o. female)   MRN & CSN:  [id number] & [id number] Encounter Date/Time: 7/15/2025 8:50 AM EDT   Location:  [id number] PCP: Cassie Harman MD     Attending:Luis Mccabe DO       Hospital Day: 2  Brief HPI  Jasmina Hahn is a 45 y.o. female with pertinent PMHx of T2DM, HTN, ESRD s/p renal transplant, MGUS, INOCENTE  Assessment & Plan     Hematemesis  - Continue to trend hemoglobin  - Likely Protonix 40 mg twice daily  - Gastroenterology consulted, planning for EGD later today    Dizziness  - Resolved, possibly secondary to above  - CT head and CTA head and neck nonacute    Type 2 diabetes  - Hold Lantus while n.p.o.  - Hold prandial lispro while n.p.o.  - Low-dose sliding scale correctional insulin every 6 hours  - Check point-of-care glucose every 6 hours to monitor for hypoglycemia from insulin toxicity    Essential hypertension  - Continue lisinopril and amlodipine    History of renal transplant  - Continue tacrolimus and CellCept    Diet Diet NPO   DVT Prophylaxis [] Lovenox, []  Heparin, [x] SCDs, [] Ambulation,  [] Eliquis, [] Xarelto  [] Coumadin   Code Status Full Code   Disposition From: Home  Expected Disposition: Home  Estimated Date of Discharge: 1 to 2 days           Subjective:     Chief Complaint: Hematemesis    Patient was seen and examined today sitting up in bed  Reports being hungry, has had no further episodes of hematemesis  Has not had a bowel movement, denies any blood in stool previously  Says her dizziness has resolved thinks may have been related to getting up and throwing up  Feels like her morning pills have been stuck in her throat    Review of Systems:      Pertinent positives and negatives discussed in HPI    Objective:     Intake/Output Summary (Last 24 hours) at 7/15/2025 0850  Last data filed at 7/15/2025 0600  Gross per 24 hour   Intake 568.42 ml   Output 2 ml   Net 566.42 ml    
    V2.0    [facility] [place]      Name:  Jasmina Hahn /Age/Sex: 1980  (45 y.o. female)   MRN & CSN:  [id number] & [id number] Encounter Date/Time: 2025 8:50 AM EDT   Location:  [id number] PCP: Cassie Harman MD     Attending:Luis Mccabe DO       Hospital Day: 7  Brief HPI  Jasmina Hahn is a 45 y.o. female with pertinent PMHx of T2DM, HTN, ESRD s/p renal transplant, MGUS, INOCENTE who presented complaining of hematemesis and abdominal pain.  Assessment & Plan     Hematemesis, resolved  - S/p EGD 7/15 without evidence of bleeding  - Hemoglobin remains stable  - Continue Protonix 40 mg twice daily  - Gastroenterology following    Intractable abdominal pain  Constipation  - CT abdomen pelvis on admission nonacute  - Right upper quadrant ultrasound  showing fatty liver otherwise nonacute  - Repeat CT abdomen/pelvis  nonacute, showing stable ventral hernia  - Without significant bowel movement yet  - Continue to encourage ambulation  - Continue scheduled bowel regimen of MiraLAX and Senokot  - Agreeable today to Dulcolax suppository    Type 2 diabetes  -Start Lantus 5 units daily  - Start lispro 3 units 3 times daily with meals  - Continue low-dose sliding scale correctional insulin 4 times daily meals and nightly  - Check point-of-care glucose 4 times daily with meals and nightly to monitor for hypoglycemia from insulin toxicity    Essential hypertension  - Continue lisinopril and amlodipine    History of renal transplant  - Continue tacrolimus and CellCept  - Nephrology following    Diet ADULT DIET; Regular; 5 carb choices (75 gm/meal)   DVT Prophylaxis [x] Lovenox, []  Heparin, [] SCDs, [] Ambulation,  [] Eliquis, [] Xarelto  [] Coumadin   Code Status Full Code   Disposition From: Home  Expected Disposition: Home  Estimated Date of Discharge: 1 to 2 days           Subjective:     Chief Complaint: Hematemesis    Patient seen and examined today sitting in chair in 
  Nephrology Progress Note  [facility]  [phone]   [employer]        Reason for Consult:  ESRD s/p renal transplant    HISTORY OF PRESENT ILLNESS:                This is a patient with significant past medical history of ESRD s/p renal transplant who presented with hematemesis.  She follows with my partner Dr. Orlando, s/p transplant for ESRD d/t DM2/HTN.  Transplant was 2019,  donor, at .  Cr has been 0.9-1.2.  On MMF 1000mg BID, tacro 1.5mg BID.  Goal tacro level 5-8.  EGD this admit 7/15 showed gastric polyps.  Prior hx of gastric sleeve.  Still having some abdominal discomfort.      Sub :  No new complaints       Current Medications:    No current facility-administered medications on file prior to encounter.     Current Outpatient Medications on File Prior to Encounter   Medication Sig Dispense Refill    Continuous Glucose Sensor (DEXCOM G7 SENSOR) MISC by Does not apply route Uses dajuan on her phone      atorvastatin (LIPITOR) 40 MG tablet Take 1 tablet by mouth daily      baclofen (LIORESAL) 10 MG tablet Take 1 tablet by mouth nightly as needed      cyclobenzaprine (FLEXERIL) 5 MG tablet Take 1 tablet by mouth 3 times daily as needed      methocarbamol (ROBAXIN) 500 MG tablet Take 1.5 tablets by mouth 3 times daily      lisinopril (PRINIVIL;ZESTRIL) 20 MG tablet Take 1 tablet by mouth daily (Patient taking differently: Take 1 tablet by mouth nightly) 30 tablet 1    tacrolimus (PROGRAF) 0.5 MG capsule Take 1 capsule by mouth 2 times daily Total dose 1.5mg BID      pregabalin (LYRICA) 25 MG capsule Take 1 capsule by mouth 3 times daily.      insulin lispro (HUMALOG,ADMELOG) 100 UNIT/ML SOLN injection vial Inject 10 Units into the skin 3 times daily (with meals) Plus sliding scale      Semaglutide (OZEMPIC, 2 MG/DOSE, SC) Inject 2 mg into the skin once a week       amLODIPine (NORVASC) 10 MG tablet Take 1 tablet by mouth daily      insulin glargine 
  Nephrology Progress Note  [facility]  [phone]   [employer]        Reason for Consult:  ESRD s/p renal transplant    HISTORY OF PRESENT ILLNESS:                This is a patient with significant past medical history of ESRD s/p renal transplant who presented with hematemesis.  She follows with my partner Dr. Orlando, s/p transplant for ESRD d/t DM2/HTN.  Transplant was 2019,  donor, at .  Cr has been 0.9-1.2.  On MMF 1000mg BID, tacro 1.5mg BID.  Goal tacro level 5-8.  EGD this admit 7/15 showed gastric polyps.  Prior hx of gastric sleeve.  Still having some abdominal discomfort.      Sub :  No new complaints       Current Medications:    No current facility-administered medications on file prior to encounter.     Current Outpatient Medications on File Prior to Encounter   Medication Sig Dispense Refill    atorvastatin (LIPITOR) 40 MG tablet Take 1 tablet by mouth daily      baclofen (LIORESAL) 10 MG tablet Take 1 tablet by mouth nightly as needed      cyclobenzaprine (FLEXERIL) 5 MG tablet Take 1 tablet by mouth 3 times daily as needed      methocarbamol (ROBAXIN) 500 MG tablet Take 1.5 tablets by mouth 3 times daily      lisinopril (PRINIVIL;ZESTRIL) 20 MG tablet Take 1 tablet by mouth daily (Patient taking differently: Take 1 tablet by mouth nightly) 30 tablet 1    tacrolimus (PROGRAF) 0.5 MG capsule Take 1 capsule by mouth 2 times daily Total dose 1.5mg BID      pregabalin (LYRICA) 25 MG capsule Take 1 capsule by mouth 3 times daily.      insulin lispro (HUMALOG,ADMELOG) 100 UNIT/ML SOLN injection vial Inject 10 Units into the skin 3 times daily (with meals) Plus sliding scale      Semaglutide (OZEMPIC, 2 MG/DOSE, SC) Inject 2 mg into the skin once a week       amLODIPine (NORVASC) 10 MG tablet Take 1 tablet by mouth daily      insulin glargine (BASAGLAR KWIKPEN) 100 UNIT/ML injection pen Inject 20 Units into the skin 2 times daily      entecavir 
  [facility] - Physical Therapy   Phone: [phone]    Physical Therapy  Facility/Department:[address] PROGRESSIVE CARE    [x] Initial Evaluation            [] Daily Treatment Note         [x] Discharge Summary      Patient: Jasmina Hahn   : 1980   MRN: [id number]   Date of Service:  7/15/2025  Staff Mobility Recommendation: Up ad david    AM-PAC score:   Discharge Recommendations: Home prn    Admitting Diagnosis: Hematemesis  Ordering Physician: Dr. Mccabe on 7/15/25  Current Admission Summary: \"Jasmina Hahn is a 45 y.o. female with a PMH of diabetes mellitus, CKD, hypertension, anemia who presented on 2025 with hematemesis. We have been consulted regarding hematemesis.  Patient reports a week ago had a new onset of vomiting with spots of blood in the toilet.  She had a repeat episode yesterday which prompted her visit to the hospital.  Denies being on blood thinners.  Status post renal transplant .  Currently on immunosuppressive medications.  Denies fever chills melena hematochezia abdominal pain weight loss constipation diarrhea or any other symptoms. \"     Past Medical History:  has a past medical history of Anemia, Chronic kidney disease, Diabetes mellitus (HCC), Hypertension, and Obesity.  Past Surgical History:  has a past surgical history that includes Cholecystectomy;  section (,,); Umbilical hernia repair; Tubal ligation (); Hand tendon surgery; Dialysis fistula creation; Endoscopy, colon, diagnostic (2018); and Upper gastrointestinal endoscopy (N/A, 7/15/2025).    Assessment  Activity Tolerance: Good  Impairments Requiring Therapeutic Intervention: none - eval with same day discharge  Prognosis: good    Clinical Assessment: Pt c/o nausea s/p EGD.  Still able to participate.  She demonstrated functional UE/LE strength/ROM, and was able to ambulate in room without device, (I).  Appears to be functioning near her baseline for mobility and 
4 Eyes Skin Assessment     NAME:  Jasmina Hahn  YOB: 1980  MEDICAL RECORD NUMBER:  [id number]    The patient is being assessed for  Admission    I agree that at least one RN has performed a thorough Head to Toe Skin Assessment on the patient. ALL assessment sites listed below have been assessed.      Areas assessed by both nurses:    Head, Face, Ears, Shoulders, Back, Chest, Arms, Elbows, Hands, Sacrum. Buttock, Coccyx, Ischium, and Legs. Feet and Heels        Does the Patient have a Wound? No noted wound(s)       Navdeep Prevention initiated by RN: No  Wound Care Orders initiated by RN: No    Pressure Injury (Stage 1,2,3,4, Unstageable, DTI, NWPT, and Complex wounds) if present, place Wound referral order by RN under : No    New Ostomies, if present place, Ostomy referral order under : No     Nurse 1 eSignature: Electronically signed by Wen Dan RN on 7/15/25 at 12:50 AM EDT    **SHARE this note so that the co-signing nurse can place an eSignature**    Nurse 2 eSignature: Electronically signed by Gail Collins RN on 7/15/25 at 6:52 AM EDT   
D/C instructions went over, no questions at this time. Pt leaving with belongings to go home.   
INPATIENT PROGRESS NOTE        IDENTIFYING DATA/REASON FOR CONSULTATION   PATIENT:  Jasmina Hahn  MRN:  [id number]  ADMIT DATE: 7/14/2025  TIME OF EVALUATION: 7/20/2025 8:53 AM  HOSPITAL STAY:   LOS: 4 days   CONSULTING PHYSICIAN: Luis Mccabe DO   REASON FOR CONSULTATION:    Subjective:    Patient had BMs yesterday. She feels somewhat improved.     MEDICATIONS   SCHEDULED:  sennosides-docusate sodium, 2 tablet, Daily  polyethylene glycol, 17 g, BID  enoxaparin, 30 mg, BID  docusate sodium, 100 mg, BID  entecavir, 0.5 mg, Daily  pantoprazole, 40 mg, BID  atorvastatin, 40 mg, Daily  insulin lispro, 0-8 Units, 4x Daily AC & HS  amLODIPine, 10 mg, Daily  citalopram, 20 mg, Nightly  [Held by provider] insulin glargine, 20 Units, BID  [Held by provider] insulin lispro, 15 Units, TID WC  lisinopril, 20 mg, Daily  mycophenolate, 1,000 mg, BID  pregabalin, 25 mg, TID  tacrolimus, 0.5 mg, QAM  tacrolimus, 1 mg, QAM  tacrolimus, 2 mg, QHS  sodium chloride flush, 5-40 mL, 2 times per day      FLUIDS/DRIPS:     sodium chloride      dextrose       PRNs: bisacodyl, 10 mg, Daily PRN  oxyCODONE, 5 mg, Q4H PRN   Or  oxyCODONE, 10 mg, Q4H PRN  diphenhydrAMINE, 25 mg, Q6H PRN  baclofen, 10 mg, Nightly PRN  cyclobenzaprine, 5 mg, TID PRN  sodium chloride flush, 5-40 mL, PRN  sodium chloride, , PRN  potassium chloride, 40 mEq, PRN   Or  potassium alternative oral replacement, 40 mEq, PRN   Or  potassium chloride, 10 mEq, PRN  magnesium sulfate, 2,000 mg, PRN  ondansetron, 4 mg, Q8H PRN   Or  ondansetron, 4 mg, Q6H PRN  acetaminophen, 650 mg, Q6H PRN   Or  acetaminophen, 650 mg, Q6H PRN  glucose, 4 tablet, PRN  dextrose bolus, 125 mL, PRN   Or  dextrose bolus, 250 mL, PRN  glucagon (rDNA), 1 mg, PRN  dextrose, , Continuous PRN      ALLERGIES:    Allergies   Allergen Reactions    Hydrocodone-Acetaminophen Itching    Iv Dye [Iodides] Other (See Comments)     Not to use due to kidney transplant    Naproxen Hives    Nsaids        
INPATIENT PROGRESS NOTE        IDENTIFYING DATA/REASON FOR CONSULTATION   PATIENT:  Jasmina Hahn  MRN:  [id number]  ADMIT DATE: 7/14/2025  TIME OF EVALUATION: 7/16/2025 10:21 AM  HOSPITAL STAY:   LOS: 0 days   CONSULTING PHYSICIAN: Luis Mccabe DO   REASON FOR CONSULTATION:    Subjective:    Patient seen in follow up   Status post EGD.  Reports epigastric discomfort and 2 episodes of nonbloody vomiting that occurred overnight.  No acute distress  MEDICATIONS   SCHEDULED:  entecavir, 0.5 mg, Daily  pantoprazole, 40 mg, BID  atorvastatin, 40 mg, Daily  insulin lispro, 0-8 Units, 4x Daily AC & HS  amLODIPine, 10 mg, Daily  citalopram, 20 mg, Nightly  [Held by provider] insulin glargine, 20 Units, BID  [Held by provider] insulin lispro, 15 Units, TID WC  lisinopril, 20 mg, Daily  mycophenolate, 1,000 mg, BID  pregabalin, 25 mg, TID  tacrolimus, 0.5 mg, QAM  tacrolimus, 1 mg, QAM  tacrolimus, 2 mg, QHS  sodium chloride flush, 5-40 mL, 2 times per day      FLUIDS/DRIPS:     sodium chloride      dextrose       PRNs: baclofen, 10 mg, Nightly PRN  cyclobenzaprine, 5 mg, TID PRN  sodium chloride flush, 5-40 mL, PRN  sodium chloride, , PRN  potassium chloride, 40 mEq, PRN   Or  potassium alternative oral replacement, 40 mEq, PRN   Or  potassium chloride, 10 mEq, PRN  magnesium sulfate, 2,000 mg, PRN  ondansetron, 4 mg, Q8H PRN   Or  ondansetron, 4 mg, Q6H PRN  polyethylene glycol, 17 g, Daily PRN  acetaminophen, 650 mg, Q6H PRN   Or  acetaminophen, 650 mg, Q6H PRN  glucose, 4 tablet, PRN  dextrose bolus, 125 mL, PRN   Or  dextrose bolus, 250 mL, PRN  glucagon (rDNA), 1 mg, PRN  dextrose, , Continuous PRN  HYDROmorphone, 1 mg, Q3H PRN      ALLERGIES:    Allergies   Allergen Reactions    Hydrocodone-Acetaminophen Itching    Iv Dye [Iodides] Other (See Comments)     Not to use due to kidney transplant    Naproxen Hives    Nsaids          PHYSICAL EXAM   VITALS:  BP (!) 147/89   Pulse 94   Temp 98.1 °F (36.7 °C) (Oral)  
INPATIENT PROGRESS NOTE        IDENTIFYING DATA/REASON FOR CONSULTATION   PATIENT:  Jasmina Hahn  MRN:  [id number]  ADMIT DATE: 7/14/2025  TIME OF EVALUATION: 7/17/2025 9:41 AM  HOSPITAL STAY:   LOS: 1 day   CONSULTING PHYSICIAN: Luis Mccabe DO   REASON FOR CONSULTATION:    Subjective:    Patient seen in follow up   Reports persistent RUQ discomfort   Able to slightly tolerate diet     MEDICATIONS   SCHEDULED:  entecavir, 0.5 mg, Daily  pantoprazole, 40 mg, BID  atorvastatin, 40 mg, Daily  insulin lispro, 0-8 Units, 4x Daily AC & HS  amLODIPine, 10 mg, Daily  citalopram, 20 mg, Nightly  [Held by provider] insulin glargine, 20 Units, BID  [Held by provider] insulin lispro, 15 Units, TID WC  lisinopril, 20 mg, Daily  mycophenolate, 1,000 mg, BID  pregabalin, 25 mg, TID  tacrolimus, 0.5 mg, QAM  tacrolimus, 1 mg, QAM  tacrolimus, 2 mg, QHS  sodium chloride flush, 5-40 mL, 2 times per day      FLUIDS/DRIPS:     sodium chloride      dextrose       PRNs: oxyCODONE, 5 mg, Q4H PRN   Or  oxyCODONE, 10 mg, Q4H PRN  diphenhydrAMINE, 25 mg, Q6H PRN  baclofen, 10 mg, Nightly PRN  cyclobenzaprine, 5 mg, TID PRN  sodium chloride flush, 5-40 mL, PRN  sodium chloride, , PRN  potassium chloride, 40 mEq, PRN   Or  potassium alternative oral replacement, 40 mEq, PRN   Or  potassium chloride, 10 mEq, PRN  magnesium sulfate, 2,000 mg, PRN  ondansetron, 4 mg, Q8H PRN   Or  ondansetron, 4 mg, Q6H PRN  polyethylene glycol, 17 g, Daily PRN  acetaminophen, 650 mg, Q6H PRN   Or  acetaminophen, 650 mg, Q6H PRN  glucose, 4 tablet, PRN  dextrose bolus, 125 mL, PRN   Or  dextrose bolus, 250 mL, PRN  glucagon (rDNA), 1 mg, PRN  dextrose, , Continuous PRN      ALLERGIES:    Allergies   Allergen Reactions    Hydrocodone-Acetaminophen Itching    Iv Dye [Iodides] Other (See Comments)     Not to use due to kidney transplant    Naproxen Hives    Nsaids          PHYSICAL EXAM   VITALS:  /74   Pulse 98   Temp 98.7 °F (37.1 °C) (Oral)   
INPATIENT PROGRESS NOTE        IDENTIFYING DATA/REASON FOR CONSULTATION   PATIENT:  Jasmina Hahn  MRN:  [id number]  ADMIT DATE: 7/14/2025  TIME OF EVALUATION: 7/19/2025 10:13 AM  HOSPITAL STAY:   LOS: 3 days   CONSULTING PHYSICIAN: Luis Mccabe DO   REASON FOR CONSULTATION:    Subjective:    Patient reports she continues to have some constipation and abdominal distension.     MEDICATIONS   SCHEDULED:  sennosides-docusate sodium, 2 tablet, Daily  polyethylene glycol, 17 g, BID  enoxaparin, 30 mg, BID  docusate sodium, 100 mg, BID  entecavir, 0.5 mg, Daily  pantoprazole, 40 mg, BID  atorvastatin, 40 mg, Daily  insulin lispro, 0-8 Units, 4x Daily AC & HS  amLODIPine, 10 mg, Daily  citalopram, 20 mg, Nightly  [Held by provider] insulin glargine, 20 Units, BID  [Held by provider] insulin lispro, 15 Units, TID WC  lisinopril, 20 mg, Daily  mycophenolate, 1,000 mg, BID  pregabalin, 25 mg, TID  tacrolimus, 0.5 mg, QAM  tacrolimus, 1 mg, QAM  tacrolimus, 2 mg, QHS  sodium chloride flush, 5-40 mL, 2 times per day      FLUIDS/DRIPS:     sodium chloride      dextrose       PRNs: bisacodyl, 10 mg, Daily PRN  oxyCODONE, 5 mg, Q4H PRN   Or  oxyCODONE, 10 mg, Q4H PRN  diphenhydrAMINE, 25 mg, Q6H PRN  baclofen, 10 mg, Nightly PRN  cyclobenzaprine, 5 mg, TID PRN  sodium chloride flush, 5-40 mL, PRN  sodium chloride, , PRN  potassium chloride, 40 mEq, PRN   Or  potassium alternative oral replacement, 40 mEq, PRN   Or  potassium chloride, 10 mEq, PRN  magnesium sulfate, 2,000 mg, PRN  ondansetron, 4 mg, Q8H PRN   Or  ondansetron, 4 mg, Q6H PRN  acetaminophen, 650 mg, Q6H PRN   Or  acetaminophen, 650 mg, Q6H PRN  glucose, 4 tablet, PRN  dextrose bolus, 125 mL, PRN   Or  dextrose bolus, 250 mL, PRN  glucagon (rDNA), 1 mg, PRN  dextrose, , Continuous PRN      ALLERGIES:    Allergies   Allergen Reactions    Hydrocodone-Acetaminophen Itching    Iv Dye [Iodides] Other (See Comments)     Not to use due to kidney transplant    
[facility]  Hyperglycemia Event      NAME: Jasmina Hahn  MEDICAL RECORD NUMBER:  [id number]  AGE: 45 y.o.   GENDER: female  : 1980  EPISODE DATE:  2025     Data     Recent Labs     25  0821 25  1143 25  1722 25  0805   POCGLU 166* 149* 207* 182* 280* 147*       HgBA1c:    Lab Results   Component Value Date/Time    LABA1C 7.6 10/01/2024 10:01 AM       BUN/Creatinine:    Lab Results   Component Value Date/Time    BUN 9 2025 04:25 AM    CREATININE 1.0 2025 04:25 AM      Latest Reference Range & Units 25 04:25   Est, Glom Filt Rate >60  71     FIB-4 Calculation: 0.57 at 2025  4:25 AM  Calculated from:  SGOT/AST: 25 U/L at 2025  8:18 AM  SGPT/ALT: 36 U/L at 2025  8:18 AM  Platelets: 314 K/uL at 2025  4:25 AM  Age: 45 years    Medications  Scheduled Medications:   sennosides-docusate sodium  2 tablet Oral Daily    polyethylene glycol  17 g Oral BID    enoxaparin  30 mg SubCUTAneous BID    docusate sodium  100 mg Oral BID    entecavir  0.5 mg Oral Daily    pantoprazole  40 mg Oral BID    atorvastatin  40 mg Oral Daily    insulin lispro  0-8 Units SubCUTAneous 4x Daily AC & HS    amLODIPine  10 mg Oral Daily    citalopram  20 mg Oral Nightly    [Held by provider] insulin glargine  20 Units SubCUTAneous BID    [Held by provider] insulin lispro  15 Units SubCUTAneous TID WC    lisinopril  20 mg Oral Daily    mycophenolate  1,000 mg Oral BID    pregabalin  25 mg Oral TID    tacrolimus  0.5 mg Oral QAM    tacrolimus  1 mg Oral QAM    tacrolimus  2 mg Oral QHS    sodium chloride flush  5-40 mL IntraVENous 2 times per day       Diet  Current diet/supplement order: ADULT DIET; Regular; 5 carb choices (75 gm/meal)     Recorded PO: PO Meals Eaten (%): 76 - 100% last meal in flowsheets      Action      Staff called due to pt voicing concerns about not having her basal insulin. Per chart review, basal insulin 
Patient arrived to floor and room, 5263. Alert oriented, independent in getting up. Assessment done with notes to follow. Placed to telemtry monitor,   
Patient reports one episode of peach colored emesis after eating.  Electronically signed by Sherry Bonner RN on 7/17/2025 at 11:14 AM    
Pharmacy Medication Reconciliation Note     List of medications patient is currently taking is complete.    Source of information:   1. Patient  2. EMR    Notes regarding home medications:   1. Patient reports taking all morning medications prior to arrival. States she has not had any of the second or third doses.  2. Patient brought in all home medications with her. States she takes all of her medications at 10AM and 10PM.      Yudelka Lazo, Pharmacy Intern  7/14/2025 9:35 PM    
Pt awake, denies pain. Abd soft. Colt po ice chips well. Report to [facility].  To room per stretcher.   
Sedation provided by anesthesia. see anesthesia record for vitals and medication administration  
To pacu from OR.  Pt asleep, wakes easily. Denies pain. Abd soft. IV infusing. Monitor in sinus rhythm.   
  Vision:   Vision: Impaired  Vision Exceptions: Wears glasses for distance (driving)  Hearing:   Hearing: Within functional limits       Observation:   General Observation:  no overt abnormalities  Posture:   Good  Sensation:   WFL and reports numbness and tingling in bilateral thighs when ambulating long distances; did not report N/T throughout eval/session  Coordination Testing:   WFL    ROM:   (B) UE AROM WFL  Strength:   (B) UE strength grossly WFL    Therapist Clinical Decision Making (Complexity): low complexity         Activities of Daily Living  Basic Activities of Daily Living  Grooming: Independent  Grooming Comments: in stance at sink to wash hands  Toileting: Independent.    Toileting Comments: urinated seated, stood to manage clothing without difficulty  General Comments: pt reporting some dizziness during functional mobility throughout the room prior to toileting - /90 (). Pt reported that dizziness improved with rest in bed  Instrumental Activities of Daily Living  No IADL completed on this date.    Functional Mobility  Bed Mobility:  Supine to Sit: modified independent  Sit to Supine: modified independent  Transfers:  Sit to stand transfer:Independent  Stand to sit transfer: Independent  Toilet transfer: Independent  Functional Mobility  Functional Mobility Activity: to/from bathroom, and in room x~40' total  Device Use: no device  Required Assistance: supervision  Comment: supervision provided secondary to pt's c/o dizziness, no LOB noted  Balance:  Static Sitting Balance: good(+): independent with high level dynamic balance in unsupported position  Dynamic Sitting Balance: good(+): independent with high level dynamic balance in unsupported position  Static Standing Balance: good: independent with functional balance in unsupported position  Dynamic Standing Balance: fair (+): maintains balance at SBA/supervision without use of UE support    Cognition  WFL  Orientation:    alert and 
Lower Chest: The lung bases are clear Organs: The native kidneys are atrophic and similar to the prior exam.  A transplant kidney in the right hemipelvis is unremarkable.  There is no evidence of urinary tract obstruction.  The remainder of the solid organs are within normal limits.  The gallbladder is been removed GI/Bowel: There is evidence of prior gastric sleeve surgery.  There is no evidence of bowel wall thickening, inflammation or free fluid. There is no bowel obstruction.  The appendix is unremarkable. Pelvis: There is no free fluid.  The urinary bladder and uterus are unremarkable Peritoneum/Retroperitoneum: The abdominal aorta and iliac arteries are normal in caliber.There is no pathologic adenopathy. Bones/Soft Tissues: A midline anterior abdominal wall hernia is unchanged from the prior exam.  There is no inflammation or bowel obstruction.     1. No acute intra-abdominal or pelvic process. 2. Stable midline anterior abdominal wall hernia. 3. Stable transplant kidney in the right hemipelvis.     CT HEAD WO CONTRAST  Result Date: 7/14/2025  EXAMINATION: CT OF THE HEAD WITHOUT CONTRAST  7/14/2025 5:33 pm TECHNIQUE: CT of the head was performed without the administration of intravenous contrast. Automated exposure control, iterative reconstruction, and/or weight based adjustment of the mA/kV was utilized to reduce the radiation dose to as low as reasonably achievable. COMPARISON: 10/25/2024 CT head HISTORY: ORDERING SYSTEM PROVIDED HISTORY: Stroke Symptoms TECHNOLOGIST PROVIDED HISTORY: Has a \"code stroke\" or \"stroke alert\" been called?->Yes Reason for exam:->Stroke Symptoms Decision Support Exception - unselect if not a suspected or confirmed emergency medical condition->Emergency Medical Condition (MA) Reason for Exam: Stroke symptoms FINDINGS: BRAIN/VENTRICLES: There is no acute intracranial hemorrhage, mass effect or midline shift.  No abnormal extra-axial fluid collection.  The gray-white 
plan where needed to reflect my impression and plans for this patient.       Pepper Hahn is a 45-year-old female with a past medical history of ESRD status post renal transplant, diabetes, hypertension, obesity, and prior PE who presents with hematemesis. Patient reports acute onset of epigastric discomfort and hematemesis starting yesterday. She reports bright red blood. She denies any NSAID use. She reports she has not had any recent EGD. She is currently on Ozempic but this is not a new medication and dose has not been increased. In the emergency department, CT scan of the abdomen and pelvis was negative. EGD performed on 7/15 with no acute findings She reports she continues to have pain in the RUQ and nausea. S/P cholecystectomy. RUQ US and repeat CT performed yesterday with no acute findings. Labs stable. Treat constipation. Keep on PPI. Advance diet as tolerated. No further GI work-up planned.     Thank you for allowing me to participate in this patient's care.  If there are any questions or concerns regarding this patient, or the plan we have set in place, please feel free to contact me at [phone].     Juancarlos Reyes MD       
answering service at [phone].    Thais Bond MD  [facility]  [phone]  [facility]    
evidence of aneurysm. OTHER: No evidence of dural venous sinus thrombosis on this non-dedicated study. BRAIN: Findings are reported separately.     No evidence of large artery occlusion or significant stenosis in the head or neck.       CBC:   Recent Labs     07/14/25 1722 07/14/25  2342 07/15/25  0433 07/16/25  0434   WBC 9.6  --  8.7 6.0   HGB 11.4* 10.6* 10.7* 10.7*     --  296 265     BMP:    Recent Labs     07/14/25  1722 07/15/25  0433 07/16/25  0434    139 139   K 3.8 3.9 4.1    104 104   CO2 23 23 25   BUN 10 6* 6*   CREATININE 0.9 0.9 1.0   GLUCOSE 95 94 122*     Hepatic:   Recent Labs     07/14/25  1722 07/15/25  0433   AST 23 17   ALT 19 16   BILITOT 0.5 0.7   ALKPHOS 88 81     Lipids:   Lab Results   Component Value Date/Time    CHOL 155 07/25/2024 05:15 AM    HDL 45 07/25/2024 05:15 AM    TRIG 112 07/25/2024 05:15 AM     Hemoglobin A1C:   Lab Results   Component Value Date/Time    LABA1C 7.6 10/01/2024 10:01 AM     TSH: No results found for: \"TSH\"  Troponin: No results found for: \"TROPONINT\"  Lactic Acid: No results for input(s): \"LACTA\" in the last 72 hours.  BNP: No results for input(s): \"PROBNP\" in the last 72 hours.  UA:  Lab Results   Component Value Date/Time    NITRU Negative 07/14/2025 07:38 PM    COLORU Yellow 07/14/2025 07:38 PM    PHUR 7.0 07/14/2025 07:38 PM    PHUR 6.0 08/03/2023 05:01 PM    WBCUA 1 07/14/2025 07:38 PM    RBCUA 0 07/14/2025 07:38 PM    BACTERIA None Seen 07/14/2025 07:38 PM    CLARITYU Clear 07/14/2025 07:38 PM    LEUKOCYTESUR Negative 07/14/2025 07:38 PM    UROBILINOGEN 1.0 07/14/2025 07:38 PM    BILIRUBINUR Negative 07/14/2025 07:38 PM    BLOODU Negative 07/14/2025 07:38 PM    GLUCOSEU Negative 07/14/2025 07:38 PM    GLUCOSEU 500 06/20/2010 04:21 PM    KETUA Negative 07/14/2025 07:38 PM     Urine Cultures:   Lab Results   Component Value Date/Time    LABURIN  08/03/2023 05:01 PM     >50,000 CFU/ml mixed skin/urogenital jen. No further workup

## 2025-07-21 NOTE — ADT AUTH CERT
Utilization Reviews       7/17  by Paty Berrios   Last updated by Paty Berrios on 7/20/2025 1611     Review Status Created By   In Primary Paty Berrios       Review Type   --      Criteria Review   DATE: 7/17  TYPE OF BED: acute     RELEVANT BASELINES: (lab values, vitals, o2 amount/delivery, etc.)  RA. Kidney transplant 2019, on immunosuppresive meds      PERTINENT UPDATES:  Failed OBS, upgraded to IP   Still complains of increased abd pain. PRN PO pain meds given. US and CT ordered. Not tolerating diet; Nausea and vomiting came back . No signs of blood. Holding all insulins except low sliding scale insulin due to decreased eating     VITALS:  T 98.1F  R 19, 20, 20  P  110, 103, 103  /76  O2 94% RA     ABNL/PERTINENT LABS/RADIOLOGY/DIAGNOSTIC STUDIES:  07/17/25 05:18  Glucose: 117 (H)  ALT: 46 (H)  Hemoglobin Quant: 10.4 (L)  Hematocrit: 31.8 (L)  MCV: 77.4 (L)  MCH: 25.3 (L)  RDW: 15.7 (H)     US GALLBLADDER RUQ  IMPRESSION:  Fatty infiltration of the liver.     CT ABDOMEN PELVIS  IMPRESSION:  1. Ventral abdominal wall rectus diastasis with bowel and fat protruding through the weakness and area of diastasis within the anterior abdominal wall, stable since the prior study.  2. No acute findings in the abdomen or pelvis.     PHYSICAL EXAM:  Abdominal:      General: There is no distension.      Palpations: Abdomen is soft.      Tenderness: There is abdominal tenderness (Mid abdomen to right upper quadrant). There is guarding.   Started having nausea and emesis again  No bleeding        MD CONSULTS/ASSESSMENT AND PLAN:  IM:  Assessment & Plan     Hematemesis, resolved  - S/p EGD 7/15 without evidence of bleeding  - Hemoglobin stable, check CBC daily  - Continue Protonix 40 mg twice daily  - Gastroenterology following     Intractable abdominal pain  - CT abdomen pelvis on admission nonacute however, she reports pain is worsened since  - Right upper quadrant ultrasound today showing fatty liver

## 2025-07-21 NOTE — PLAN OF CARE
Problem: Chronic Conditions and Co-morbidities  Goal: Patient's chronic conditions and co-morbidity symptoms are monitored and maintained or improved  7/21/2025 1142 by Radha Bernstein RN  Outcome: Progressing  7/21/2025 0434 by Kalee Ponce, RN  Outcome: Progressing     Problem: Discharge Planning  Goal: Discharge to home or other facility with appropriate resources  7/21/2025 1142 by Radha Bernstein, RN  Outcome: Progressing  7/21/2025 0434 by Kalee Ponce, RN  Outcome: Progressing

## 2025-07-21 NOTE — PLAN OF CARE
Problem: Chronic Conditions and Co-morbidities  Goal: Patient's chronic conditions and co-morbidity symptoms are monitored and maintained or improved  Outcome: Progressing     Problem: Discharge Planning  Goal: Discharge to home or other facility with appropriate resources  Outcome: Progressing     Problem: Pain  Goal: Verbalizes/displays adequate comfort level or baseline comfort level  Outcome: Progressing     Problem: Safety - Adult  Goal: Free from fall injury  Outcome: Progressing     Problem: Neurosensory - Adult  Goal: Achieves stable or improved neurological status  Outcome: Progressing     Problem: Neurosensory - Adult  Goal: Absence of seizures  Outcome: Progressing     Problem: Neurosensory - Adult  Goal: Achieves maximal functionality and self care  Outcome: Progressing     Problem: ABCDS Injury Assessment  Goal: Absence of physical injury  Outcome: Progressing

## 2025-07-21 NOTE — DISCHARGE SUMMARY
V2.0  Discharge Summary    Name:  Jasmina Hahn /Age/Sex: 1980 (45 y.o. female)   Admit Date: 2025  Discharge Date: 25    MRN & CSN:  [id number] & [id number] Encounter Date and Time 25 10:01 AM EDT    Attending:  Luis Mccabe DO Discharging Provider: Luis Mccabe DO       Hospital Course:     Brief HPI: Jasmina Hahn is a 45 y.o. female with a pertinent PMHx of T2DM, HTN, ESRD s/p renal transplant, MGUS, INOCENTE who presented complaining of hematemesis and abdominal pain.    Brief Problem Based Course:     Hematemesis, resolved  - None observed while inpatient  - S/p EGD on 7/15 without evidence of bleeding  - Hemoglobin remained stable throughout stay  - GI was following and recommended continuing on twice daily PPI  - Outpatient follow-up with GI for colonoscopy for which she is due    Tractable abdominal pain, improved  Constipation, improved  - General CT abdomen pelvis on admission which was nonacute however she developed worsening abdominal pain and therefore repeat CT abdomen pelvis with  was also obtained which was nonacute and showed stable ventral hernia  - She also underwent a right upper quadrant ultrasound on  which showed fatty liver otherwise was nonacute  - She was started on an aggressive bowel regimen of scheduled MiraLAX, senna, docusate and received both magnesium citrate and a Dulcolax suppository once and finally had a moderate-sized bowel movement with improvement in her pain  - Continue MiraLAX and senna as needed    Type 2 diabetes  - Her oral intake throughout the admission was actually fairly poor and therefore her home insulin regimen was reduced while inpatient  - Has continuous glucose monitor  - As appetite improves resume home insulin regimen  - Follow-up with PCP      The patient expressed appropriate understanding of, and agreement with the discharge recommendations, medications, and plan.     Consults this admission:  IP CONSULT TO

## (undated) DEVICE — ENDOSCOPY KIT: Brand: MEDLINE INDUSTRIES, INC.

## (undated) DEVICE — BITE BLOCK ENDOSCP AD 60 FR W/ ADJ STRP PLAS GRN BLOX